# Patient Record
Sex: FEMALE | Race: ASIAN | NOT HISPANIC OR LATINO | Employment: FULL TIME | URBAN - METROPOLITAN AREA
[De-identification: names, ages, dates, MRNs, and addresses within clinical notes are randomized per-mention and may not be internally consistent; named-entity substitution may affect disease eponyms.]

---

## 2017-02-14 DIAGNOSIS — L63.9 ALOPECIA AREATA: ICD-10-CM

## 2017-02-14 DIAGNOSIS — L63.9 ALOPECIA AREATA: Primary | ICD-10-CM

## 2017-02-14 LAB
ALBUMIN SERPL-MCNC: 4 G/DL (ref 3.4–5)
ALP SERPL-CCNC: 61 U/L (ref 40–150)
ALT SERPL W P-5'-P-CCNC: 16 U/L (ref 0–50)
ANION GAP SERPL CALCULATED.3IONS-SCNC: 4 MMOL/L (ref 3–14)
AST SERPL W P-5'-P-CCNC: 13 U/L (ref 0–45)
BASOPHILS # BLD AUTO: 0.1 10E9/L (ref 0–0.2)
BASOPHILS NFR BLD AUTO: 0.7 %
BILIRUB SERPL-MCNC: 1.1 MG/DL (ref 0.2–1.3)
BUN SERPL-MCNC: 11 MG/DL (ref 7–30)
CALCIUM SERPL-MCNC: 8.9 MG/DL (ref 8.5–10.1)
CHLORIDE SERPL-SCNC: 107 MMOL/L (ref 94–109)
CO2 SERPL-SCNC: 28 MMOL/L (ref 20–32)
CREAT SERPL-MCNC: 0.76 MG/DL (ref 0.52–1.04)
DIFFERENTIAL METHOD BLD: NORMAL
EOSINOPHIL # BLD AUTO: 0.6 10E9/L (ref 0–0.7)
EOSINOPHIL NFR BLD AUTO: 8.3 %
ERYTHROCYTE [DISTWIDTH] IN BLOOD BY AUTOMATED COUNT: 14 % (ref 10–15)
GFR SERPL CREATININE-BSD FRML MDRD: NORMAL ML/MIN/1.7M2
GLUCOSE SERPL-MCNC: 92 MG/DL (ref 70–99)
HCT VFR BLD AUTO: 42.3 % (ref 35–47)
HGB BLD-MCNC: 13.5 G/DL (ref 11.7–15.7)
LYMPHOCYTES # BLD AUTO: 1.8 10E9/L (ref 0.8–5.3)
LYMPHOCYTES NFR BLD AUTO: 26.8 %
MCH RBC QN AUTO: 28.8 PG (ref 26.5–33)
MCHC RBC AUTO-ENTMCNC: 31.9 G/DL (ref 31.5–36.5)
MCV RBC AUTO: 90 FL (ref 78–100)
MONOCYTES # BLD AUTO: 0.7 10E9/L (ref 0–1.3)
MONOCYTES NFR BLD AUTO: 9.9 %
NEUTROPHILS # BLD AUTO: 3.7 10E9/L (ref 1.6–8.3)
NEUTROPHILS NFR BLD AUTO: 54.3 %
PLATELET # BLD AUTO: 330 10E9/L (ref 150–450)
POTASSIUM SERPL-SCNC: 3.8 MMOL/L (ref 3.4–5.3)
PROT SERPL-MCNC: 7.7 G/DL (ref 6.8–8.8)
RBC # BLD AUTO: 4.68 10E12/L (ref 3.8–5.2)
SODIUM SERPL-SCNC: 139 MMOL/L (ref 133–144)
WBC # BLD AUTO: 6.8 10E9/L (ref 4–11)

## 2017-02-14 PROCEDURE — 80053 COMPREHEN METABOLIC PANEL: CPT

## 2017-02-14 PROCEDURE — 85025 COMPLETE CBC W/AUTO DIFF WBC: CPT

## 2017-02-14 PROCEDURE — 36415 COLL VENOUS BLD VENIPUNCTURE: CPT

## 2017-03-02 ENCOUNTER — TELEPHONE (OUTPATIENT)
Dept: FAMILY MEDICINE | Facility: CLINIC | Age: 27
End: 2017-03-02

## 2017-03-02 NOTE — TELEPHONE ENCOUNTER
RADHA Carrion is the  performing lab but not the ordering provider so I am unable to fax.   Routing to our lab to see if the originating orders appear to be from the [facility].       Elza KOCH, Medical Assistant

## 2017-03-02 NOTE — TELEPHONE ENCOUNTER
Reason for Call:  Request for results:    Name of test or procedure: Pt is requesting the labs from 2/14 be sent over   To Dr. Antoinette Paiz skin care Doctor at   [phone]    Date of test of procedure: 2/14    Location of the test or procedure: [facility]    OK to leave the result message on voice mail or with a family member? NO    Phone number Patient can be reached at:  Cell number on file:    Telephone Information:   Mobile [phone]       Additional comments:     Call taken on 3/2/2017 at 11:09 AM by Adelaida Parra

## 2018-09-18 ENCOUNTER — OFFICE VISIT (OUTPATIENT)
Dept: FAMILY MEDICINE | Facility: CLINIC | Age: 28
End: 2018-09-18
Payer: OTHER GOVERNMENT

## 2018-09-18 VITALS
SYSTOLIC BLOOD PRESSURE: 104 MMHG | HEART RATE: 73 BPM | HEIGHT: 66 IN | WEIGHT: 167.5 LBS | TEMPERATURE: 98.4 F | OXYGEN SATURATION: 98 % | BODY MASS INDEX: 26.92 KG/M2 | DIASTOLIC BLOOD PRESSURE: 62 MMHG

## 2018-09-18 DIAGNOSIS — Z23 NEED FOR PROPHYLACTIC VACCINATION WITH TETANUS-DIPHTHERIA (TD): ICD-10-CM

## 2018-09-18 DIAGNOSIS — L63.0 ALOPECIA AREATA TOTALIS: ICD-10-CM

## 2018-09-18 DIAGNOSIS — Z00.00 ROUTINE GENERAL MEDICAL EXAMINATION AT A HEALTH CARE FACILITY: Primary | ICD-10-CM

## 2018-09-18 DIAGNOSIS — Z12.4 SCREENING FOR MALIGNANT NEOPLASM OF CERVIX: ICD-10-CM

## 2018-09-18 PROCEDURE — G0145 SCR C/V CYTO,THINLAYER,RESCR: HCPCS | Performed by: FAMILY MEDICINE

## 2018-09-18 PROCEDURE — 84443 ASSAY THYROID STIM HORMONE: CPT | Performed by: FAMILY MEDICINE

## 2018-09-18 PROCEDURE — 87491 CHLMYD TRACH DNA AMP PROBE: CPT | Performed by: FAMILY MEDICINE

## 2018-09-18 PROCEDURE — 87389 HIV-1 AG W/HIV-1&-2 AB AG IA: CPT | Performed by: FAMILY MEDICINE

## 2018-09-18 PROCEDURE — 87591 N.GONORRHOEAE DNA AMP PROB: CPT | Performed by: FAMILY MEDICINE

## 2018-09-18 PROCEDURE — 36415 COLL VENOUS BLD VENIPUNCTURE: CPT | Performed by: FAMILY MEDICINE

## 2018-09-18 PROCEDURE — 99385 PREV VISIT NEW AGE 18-39: CPT | Performed by: FAMILY MEDICINE

## 2018-09-18 PROCEDURE — 80061 LIPID PANEL: CPT | Performed by: FAMILY MEDICINE

## 2018-09-18 RX ORDER — ALBUTEROL SULFATE 90 UG/1
2 AEROSOL, METERED RESPIRATORY (INHALATION) EVERY 6 HOURS PRN
Qty: 1 INHALER | Refills: 0 | Status: SHIPPED | OUTPATIENT
Start: 2018-09-18 | End: 2019-10-05

## 2018-09-18 NOTE — MR AVS SNAPSHOT
After Visit Summary   9/18/2018    Bhumika Gonsalez    MRN: [id number]           Patient Information     Date Of Birth          1990        Visit Information        Provider Department      9/18/2018 9:20 AM Ann Mittal MD [facility]        Today's Diagnoses     Routine general medical examination at a health care facility    -  1    Screening for malignant neoplasm of cervix        Alopecia areata totalis        Need for prophylactic vaccination with tetanus-diphtheria (TD)          Care Instructions      Preventive Health Recommendations  Female Ages 26 - 39  Yearly exam:   See your health care provider every year in order to    Review health changes.     Discuss preventive care.      Review your medicines if you your doctor has prescribed any.    Until age 30: Get a Pap test every three years (more often if you have had an abnormal result).    After age 30: Talk to your doctor about whether you should have a Pap test every 3 years or have a Pap test with HPV screening every 5 years.   You do not need a Pap test if your uterus was removed (hysterectomy) and you have not had cancer.  You should be tested each year for STDs (sexually transmitted diseases), if you're at risk.   Talk to your provider about how often to have your cholesterol checked.  If you are at risk for diabetes, you should have a diabetes test (fasting glucose).  Shots: Get a flu shot each year. Get a tetanus shot every 10 years.   Nutrition:     Eat at least 5 servings of fruits and vegetables each day.    Eat whole-grain bread, whole-wheat pasta and brown rice instead of white grains and rice.    Get adequate Calcium and Vitamin D.     Lifestyle    Exercise at least 150 minutes a week (30 minutes a day, 5 days of the week). This will help you control your weight and prevent disease.    Limit alcohol to one drink per day.    No smoking.     Wear sunscreen to prevent skin cancer.    See  "your dentist every six months for an exam and cleaning.            Follow-ups after your visit        Follow-up notes from your care team     Return in about 4 weeks (around 10/16/2018) for follow up with aubrey for nexplanon removal.      Your next 10 appointments already scheduled     Sep 28, 2018  9:10 AM CDT   Office Visit with Jill Muhammad PA-C   [facility] ([facility])    [address] [place] [address] [phone]           Bring a current list of meds and any records pertaining to this visit. For Physicals, please bring immunization records and any forms needing to be filled out. Please arrive 10 minutes early to complete paperwork.              Who to contact     If you have questions or need follow up information about today's clinic visit or your schedule please contact [facility] directly at [phone].  Normal or non-critical lab and imaging results will be communicated to you by Cant[employer], letter or phone within 4 business days after the clinic has received the results. If you do not hear from us within 7 days, please contact the clinic through [employer] or phone. If you have a critical or abnormal lab result, we will notify you by phone as soon as possible.  Submit refill requests through Fingo or call your pharmacy and they will forward the refill request to us. Please allow 3 business days for your refill to be completed.          Additional Information About Your Visit        Fingo Information     Fingo lets you send messages to your doctor, view your test results, renew your prescriptions, schedule appointments and more. To sign up, go to [email or web address] . Click on \"Log in\" on the left side of the screen, which will take you to the Welcome page. Then click on \"Sign up Now\" on the right side of the page.     You will be asked to enter the " "access code listed below, as well as some personal information. Please follow the directions to create your username and password.     Your access code is: [email or web address]  Expires: 2018  9:01 AM     Your access code will  in 90 days. If you need help or a new code, please call your [place] clinic or [phone].        Care EveryWhere ID     This is your Care EveryWhere ID. This could be used by other organizations to access your [place] medical records  [id number]        Your Vitals Were     Pulse Temperature Height Pulse Oximetry BMI (Body Mass Index)       73 98.4  F (36.9  C) (Tympanic) 5' 6\" (1.676 m) 98% 27.04 kg/m2        Blood Pressure from Last 3 Encounters:   18 104/62   14 101/55   14 108/66    Weight from Last 3 Encounters:   18 167 lb 8 oz (76 kg)   14 147 lb (66.7 kg)   14 145 lb 3.2 oz (65.9 kg)              We Performed the Following     CHLAMYDIA TRACHOMATIS PCR     HIV Screening     Lipid panel reflex to direct LDL Fasting     NEISSERIA GONORRHOEA PCR     Pap imaged thin layer screen reflex to HPV if ASCUS - recommend age 25 - 29     TSH with free T4 reflex          Today's Medication Changes          These changes are accurate as of 18 10:19 AM.  If you have any questions, ask your nurse or doctor.               Start taking these medicines.        Dose/Directions    albuterol 108 (90 Base) MCG/ACT inhaler   Commonly known as:  PROAIR HFA/PROVENTIL HFA/VENTOLIN HFA   Used for:  Routine general medical examination at a health care facility   Started by:  Ann Mittal MD        Dose:  2 puff   Inhale 2 puffs into the lungs every 6 hours as needed for shortness of breath / dyspnea or wheezing   Quantity:  1 Inhaler   Refills:  0         Stop taking these medicines if you haven't already. Please contact your care team if you have questions.     clindamycin 1 % solution   Commonly known as:  CLEOCIN T   Stopped by:  Ann Mittal MD    "        clobetasol propionate 0.05 % Foam   Commonly known as:  OLUX   Stopped by:  Ann Mittal MD           clobetasol propionate 0.05 % Sham   Stopped by:  Ann Mittal MD           ketoconazole 2 % shampoo   Commonly known as:  NIZORAL   Stopped by:  Ann Mittal MD                Where to get your medicines      These medications were sent to [facility] [address] [place] [address] AT [address] [place] [address] & [address] [address]     Phone:  [phone]     albuterol 108 (90 Base) MCG/ACT inhaler                Primary Care Provider Office Phone # Fax #    [facility] [phone]       [address] [facility] [facility] [address]  [facility] [address]        Equal Access to Services     RAEGAN VIGIL : Hadii deneen louis hadasho Soomaali, waaxda luqadaha, qaybta kaalmada adeegyada, aruna mc. So [facility] [phone].    ATENCIÓN: Si habla español, tiene a torres disposición servicios gratuitos de asistencia lingüística. Nikia al [phone].    We comply with applicable federal civil rights laws and [place] laws. We do not discriminate on the basis of race, color, national origin, age, disability, sex, sexual orientation, or gender identity.            Thank you!     Thank you for choosing [facility]  for your care. Our goal is always to provide you with excellent care. Hearing back from our patients is one way we can continue to improve our services. Please take a few minutes to complete the written survey that you may receive in the mail after your visit with us. Thank you!             Your Updated Medication List - Protect others around you: Learn how to safely use, store and throw away your medicines at www.disposemymeds.org.          This list is accurate as of 9/18/18 10:19 AM.  Always use your most recent med list.                   Brand Name  Dispense Instructions for use Diagnosis    albuterol 108 (90 Base) MCG/ACT inhaler    PROAIR HFA/PROVENTIL HFA/VENTOLIN HFA    1 Inhaler    Inhale 2 puffs into the lungs every 6 hours as needed for shortness of breath / dyspnea or wheezing    Routine general medical examination at a health care facility       etonogestrel 68 MG Impl    IMPLANON/NEXPLANON     by Subdermal route once

## 2018-09-18 NOTE — PROGRESS NOTES
SUBJECTIVE:   CC: Bhumika Gonsalez is an 28 year old woman who presents for preventive health visit.     Physical   Annual:     Getting at least 3 servings of Calcium per day:  Yes    Bi-annual eye exam:  Yes    Dental care twice a year:  Yes    Sleep apnea or symptoms of sleep apnea:  None    Diet:  Regular (no restrictions)    Frequency of exercise:  4-5 days/week    Duration of exercise:  30-45 minutes    Taking medications regularly:  Not Applicable    Additional concerns today:  No          Today's PHQ-2 Score:   PHQ-2 ( 1999 Pfizer) 9/18/2018   Q1: Little interest or pleasure in doing things 0   Q2: Feeling down, depressed or hopeless 0   PHQ-2 Score 0   Q1: Little interest or pleasure in doing things Not at all   Q2: Feeling down, depressed or hopeless Not at all   PHQ-2 Score 0       Abuse: Current or Past(Physical, Sexual or Emotional)- No  Do you feel safe in your environment - Yes    Social History   Substance Use Topics     Smoking status: Never Smoker     Smokeless tobacco: Never Used     Alcohol use Not on file     Alcohol Use 9/18/2018   If you drink alcohol do you typically have greater than 3 drinks per day OR greater than 7 drinks per week? No   No flowsheet data found.    Reviewed orders with patient.  Reviewed health maintenance and updated orders accordingly - Yes  Labs reviewed in EPIC    Mammogram not appropriate for this patient based on age.    Pertinent mammograms are reviewed under the imaging tab.  History of abnormal Pap smear: NO - age 21-29 PAP every 3 years recommended  NO - age 30- 65 PAP every 3 years recommended     Reviewed and updated as needed this visit by clinical staff  Tobacco  Allergies  Meds  Med Hx  Surg Hx  Fam Hx  Soc Hx        Reviewed and updated as needed this visit by Provider  Meds            Review of Systems  CONSTITUTIONAL: NEGATIVE for fever, chills, change in weight  INTEGUMENTARU/SKIN: NEGATIVE for worrisome rashes, moles or lesions  EYES: NEGATIVE for  "vision changes or irritation  ENT: NEGATIVE for ear, mouth and throat problems  RESP: NEGATIVE for significant cough or SOB  BREAST: NEGATIVE for masses, tenderness or discharge  CV: NEGATIVE for chest pain, palpitations or peripheral edema  GI: NEGATIVE for nausea, abdominal pain, heartburn, or change in bowel habits  : NEGATIVE for unusual urinary or vaginal symptoms. Periods are regular.  MUSCULOSKELETAL: NEGATIVE for significant arthralgias or myalgia  NEURO: NEGATIVE for weakness, dizziness or paresthesias  PSYCHIATRIC: NEGATIVE for changes in mood or affect     OBJECTIVE:   /62 (Cuff Size: Adult Large)  Pulse 73  Temp 98.4  F (36.9  C) (Tympanic)  Ht 5' 6\" (1.676 m)  Wt 167 lb 8 oz (76 kg)  SpO2 98%  BMI 27.04 kg/m2  Physical Exam  GENERAL: healthy, alert and no distress  EYES: Eyes grossly normal to inspection, PERRL and conjunctivae and sclerae normal  HENT: ear canals and TM's normal, nose and mouth without ulcers or lesions  NECK: no adenopathy, no asymmetry, masses, or scars and thyroid normal to palpation  RESP: lungs clear to auscultation - no rales, rhonchi or wheezes  BREAST: normal without masses, tenderness or nipple discharge and no palpable axillary masses or adenopathy  CV: regular rate and rhythm, normal S1 S2, no S3 or S4, no murmur, click or rub, no peripheral edema and peripheral pulses strong  ABDOMEN: soft, nontender, no hepatosplenomegaly, no masses and bowel sounds normal   (female): normal female external genitalia, normal urethral meatus, vaginal mucosa pink, moist, well rugated, and normal cervix/adnexa/uterus without masses or discharge  MS: no gross musculoskeletal defects noted, no edema  SKIN: no suspicious lesions or rashes and total alopecia noted  NEURO: Normal strength and tone, mentation intact and speech normal  PSYCH: mentation appears normal, affect normal/bright    Diagnostic Test Results:  none     ASSESSMENT/PLAN:       ICD-10-CM    1. Routine general " "medical examination at a health care facility Z00.00 HIV Screening     albuterol (PROAIR HFA/PROVENTIL HFA/VENTOLIN HFA) 108 (90 Base) MCG/ACT inhaler     NEISSERIA GONORRHOEA PCR     CHLAMYDIA TRACHOMATIS PCR     Lipid panel reflex to direct LDL Fasting     TSH with free T4 reflex   2. Screening for malignant neoplasm of cervix Z12.4 Pap imaged thin layer screen reflex to HPV if ASCUS - recommend age 25 - 29   3. Alopecia areata totalis L63.0    4. Need for prophylactic vaccination with tetanus-diphtheria (TD) Z23     Pt thinks she had [employer] - will double check records and let us know     Would like nexplanon removed - been 3 years.  Will make appointment with Yvonne Muhammad at [facility].    COUNSELING:  Reviewed preventive health counseling, as reflected in patient instructions       Regular exercise       Healthy diet/nutrition       Contraception       Safe sex practices/STD prevention       HIV screeninx in teen years, 1x in adult years, and at intervals if high risk    BP Readings from Last 1 Encounters:   18 104/62     Estimated body mass index is 27.04 kg/(m^2) as calculated from the following:    Height as of this encounter: 5' 6\" (1.676 m).    Weight as of this encounter: 167 lb 8 oz (76 kg).      Weight management plan: Discussed healthy diet and exercise guidelines and patient will follow up in 12 months in clinic to re-evaluate.     reports that she has never smoked. She has never used smokeless tobacco.      Counseling Resources:  ATP IV Guidelines  Pooled Cohorts Equation Calculator  Breast Cancer Risk Calculator  FRAX Risk Assessment  ICSI Preventive Guidelines  Dietary Guidelines for Americans,   USDA's MyPlate  ASA Prophylaxis  Lung CA Screening    Ann Mittal MD  [facility]  Answers for HPI/ROS submitted by the patient on 2018   PHQ-2 Score: 0    "

## 2018-09-19 LAB
C TRACH DNA SPEC QL NAA+PROBE: NEGATIVE
CHOLEST SERPL-MCNC: 173 MG/DL
HDLC SERPL-MCNC: 39 MG/DL
HIV 1+2 AB+HIV1 P24 AG SERPL QL IA: NONREACTIVE
LDLC SERPL CALC-MCNC: 108 MG/DL
N GONORRHOEA DNA SPEC QL NAA+PROBE: NEGATIVE
NONHDLC SERPL-MCNC: 134 MG/DL
SPECIMEN SOURCE: NORMAL
SPECIMEN SOURCE: NORMAL
TRIGL SERPL-MCNC: 132 MG/DL
TSH SERPL DL<=0.005 MIU/L-ACNC: 1.16 MU/L (ref 0.4–4)

## 2018-09-20 LAB
COPATH REPORT: NORMAL
PAP: NORMAL

## 2018-09-21 NOTE — PROGRESS NOTES
Dear Bhumika,  These results are in an acceptable range.  I recommend no change to your plan of care.  Please contact the clinic with any questions.  Sincerely,  Dr. Ann Mittal MD  [facility]  [phone]

## 2018-09-28 ENCOUNTER — APPOINTMENT (OUTPATIENT)
Dept: FAMILY MEDICINE | Facility: CLINIC | Age: 28
End: 2018-09-28
Payer: OTHER GOVERNMENT

## 2018-09-28 ENCOUNTER — OFFICE VISIT (OUTPATIENT)
Dept: FAMILY MEDICINE | Facility: CLINIC | Age: 28
End: 2018-09-28
Payer: OTHER GOVERNMENT

## 2018-09-28 VITALS
SYSTOLIC BLOOD PRESSURE: 110 MMHG | HEART RATE: 64 BPM | BODY MASS INDEX: 27.28 KG/M2 | WEIGHT: 169 LBS | RESPIRATION RATE: 16 BRPM | TEMPERATURE: 98.4 F | DIASTOLIC BLOOD PRESSURE: 60 MMHG

## 2018-09-28 DIAGNOSIS — Z30.46 NEXPLANON REMOVAL: Primary | ICD-10-CM

## 2018-09-28 PROCEDURE — 11982 REMOVE DRUG IMPLANT DEVICE: CPT | Performed by: PHYSICIAN ASSISTANT

## 2018-09-28 PROCEDURE — 99207 ZZC NO CHARGE LOS: CPT | Performed by: PHYSICIAN ASSISTANT

## 2018-09-28 NOTE — MR AVS SNAPSHOT
After Visit Summary   9/28/2018    Bhumika Gonsalez    MRN: [id number]           Patient Information     Date Of Birth          1990        Visit Information        Provider Department      9/28/2018 9:10 AM Jill Muhammad PA-C [facility]        Today's Diagnoses     Nexplanon removal    -  1       Follow-ups after your visit        Follow-up notes from your care team     Return in about 1 year (around 9/28/2019) for Annual Exam.      Who to contact     If you have questions or need follow up information about today's clinic visit or your schedule please contact [facility] directly at [phone].  Normal or non-critical lab and imaging results will be communicated to you by MyChart, letter or phone within 4 business days after the clinic has received the results. If you do not hear from us within 7 days, please contact the clinic through [employer] or phone. If you have a critical or abnormal lab result, we will notify you by phone as soon as possible.  Submit refill requests through [employer] or call your pharmacy and they will forward the refill request to us. Please allow 3 business days for your refill to be completed.          Additional Information About Your Visit        MyChart Information     [employer] gives you secure access to your electronic health record. If you see a primary care provider, you can also send messages to your care team and make appointments. If you have questions, please call your primary care clinic.  If you do not have a primary care provider, please call [phone] and they will assist you.        Care EveryWhere ID     This is your Care EveryWhere ID. This could be used by other organizations to access your [place] medical records  [id number]        Your Vitals Were     Pulse Temperature Respirations BMI (Body Mass Index)          64 98.4  F (36.9  C) (Tympanic) 16 27.28 kg/m2         Blood  Pressure from Last 3 Encounters:   09/28/18 110/60   09/18/18 104/62   08/18/14 101/55    Weight from Last 3 Encounters:   09/28/18 169 lb (76.7 kg)   09/18/18 167 lb 8 oz (76 kg)   08/18/14 147 lb (66.7 kg)              We Performed the Following     REMOVAL NEXPLANON          Today's Medication Changes          These changes are accurate as of 9/28/18  9:46 AM.  If you have any questions, ask your nurse or doctor.               Stop taking these medicines if you haven't already. Please contact your care team if you have questions.     etonogestrel 68 MG Impl   Commonly known as:  IMPLANON/NEXPLANON   Stopped by:  Jill Muhammad PA-C                    Primary Care Provider Office Phone # Fax #    [facility] [place] [phone]       [address] [facility] [facility] [address]  [place] [address]        Equal Access to Services     RAEGAN VIGIL : Hadii deneen louis hadasho Soomaali, waaxda luqadaha, qaybta kaalmada adeegyada, waxay joein haygeoffrey cancino . So [facility] [phone].    ATENCIÓN: Si habla español, tiene a torres disposición servicios gratuitos de asistencia lingüística. Llame al [phone].    We comply with applicable federal civil rights laws and [place] laws. We do not discriminate on the basis of race, color, national origin, age, disability, sex, sexual orientation, or gender identity.            Thank you!     Thank you for choosing [facility]  for your care. Our goal is always to provide you with excellent care. Hearing back from our patients is one way we can continue to improve our services. Please take a few minutes to complete the written survey that you may receive in the mail after your visit with us. Thank you!             Your Updated Medication List - Protect others around you: Learn how to safely use, store and throw away your medicines at www.disposemymeds.org.          This list is accurate as of 9/28/18   9:46 AM.  Always use your most recent med list.                   Brand Name Dispense Instructions for use Diagnosis    albuterol 108 (90 Base) MCG/ACT inhaler    PROAIR HFA/PROVENTIL HFA/VENTOLIN HFA    1 Inhaler    Inhale 2 puffs into the lungs every 6 hours as needed for shortness of breath / dyspnea or wheezing    Routine general medical examination at a health care facility

## 2018-09-28 NOTE — PROCEDURES
The Nexplanon chioma was located in the left side  arm.  The distal and proximal ends of the chioma were located and marked with a marking pen and the area cleansed with betadine. Approximately 3 cc's of 1% lidocaine with epinephrine was injected into the area under the Nexplanon chioma and a small skin incision made using a #11 blade.  The Nexplanon was gently manipulated until the tip was at the incision. The chioma tip was grasped with a  Meg clamp and was gently removed from the incision.  Both chioma tips were inspected following removal and found to be completely intact.  The incision site was hemostatic and a steri-strip applied to the area along with a small pressure dressing.  Pt tolerated procedure without complication.    Signs of infection discussed.  Pt will call if noted and will put in antibiotic prescription for treatment.

## 2018-12-28 ENCOUNTER — OFFICE VISIT (OUTPATIENT)
Dept: FAMILY MEDICINE | Facility: CLINIC | Age: 28
End: 2018-12-28
Payer: OTHER GOVERNMENT

## 2018-12-28 VITALS
SYSTOLIC BLOOD PRESSURE: 110 MMHG | DIASTOLIC BLOOD PRESSURE: 62 MMHG | TEMPERATURE: 98.4 F | HEART RATE: 76 BPM | BODY MASS INDEX: 27.44 KG/M2 | WEIGHT: 170 LBS | OXYGEN SATURATION: 99 %

## 2018-12-28 DIAGNOSIS — N91.1 SECONDARY AMENORRHEA: Primary | ICD-10-CM

## 2018-12-28 LAB
FSH SERPL-ACNC: 6.7 IU/L
PROLACTIN SERPL-MCNC: 20 UG/L (ref 3–27)

## 2018-12-28 PROCEDURE — 83001 ASSAY OF GONADOTROPIN (FSH): CPT | Performed by: FAMILY MEDICINE

## 2018-12-28 PROCEDURE — 84703 CHORIONIC GONADOTROPIN ASSAY: CPT | Performed by: FAMILY MEDICINE

## 2018-12-28 PROCEDURE — 84403 ASSAY OF TOTAL TESTOSTERONE: CPT | Performed by: FAMILY MEDICINE

## 2018-12-28 PROCEDURE — 84146 ASSAY OF PROLACTIN: CPT | Performed by: FAMILY MEDICINE

## 2018-12-28 PROCEDURE — 84443 ASSAY THYROID STIM HORMONE: CPT | Performed by: FAMILY MEDICINE

## 2018-12-28 PROCEDURE — 82627 DEHYDROEPIANDROSTERONE: CPT | Performed by: FAMILY MEDICINE

## 2018-12-28 PROCEDURE — 36415 COLL VENOUS BLD VENIPUNCTURE: CPT | Performed by: FAMILY MEDICINE

## 2018-12-28 PROCEDURE — 99213 OFFICE O/P EST LOW 20 MIN: CPT | Performed by: FAMILY MEDICINE

## 2018-12-28 PROCEDURE — 83498 ASY HYDROXYPROGESTERONE 17-D: CPT | Performed by: FAMILY MEDICINE

## 2018-12-28 NOTE — PROGRESS NOTES
SUBJECTIVE:   Bhumika Gonsalez is a 28 year old female who presents to clinic today for the following health issues:    amenorrhea      Duration: sept 2018     Description (location/character/radiation): ma    Intensity:  mild    Accompanying signs and symptoms: pt had nexplonon removed    History (similar episodes/previous evaluation): None    Precipitating or alleviating factors: None    Therapies tried and outcome: None     Pt took at home pregnancy test yesterday- negative      28-year-old female with alopecia but otherwise healthy here today for amenorrhea.  Patient tells me that she had the Nexplanon until September of this year.  During that time she had irregular spotting pretty much throughout.  Previously she had been off birth control for a few years and was getting regular periods every month.  Prior to that she had been on birth control since age 20 and had regular periods with that.    She has taken some pregnancy tests and they are negative at home.  She is sexually active with male partners and using condoms for protection.  She denies any symptoms such as breast tenderness, breast discharge, headaches, weight gain, cold intolerance.    She has no family history that she knows of of early menopause she is hoping to get pregnant within the next year.    Problem list and histories reviewed & adjusted, as indicated.  Additional history: as documented      Reviewed and updated as needed this visit by clinical staff  Tobacco  Allergies  Meds  Problems  Med Hx  Surg Hx  Fam Hx  Soc Hx        Reviewed and updated as needed this visit by Provider  Meds  Problems         ROS:  Constitutional, HEENT, cardiovascular, pulmonary, gi and gu systems are negative, except as otherwise noted.    OBJECTIVE:     /62 (Cuff Size: Adult Large)   Pulse 76   Temp 98.4  F (36.9  C) (Tympanic)   Wt 77.1 kg (170 lb)   SpO2 99%   BMI 27.44 kg/m    Body mass index is 27.44 kg/m .  GENERAL: healthy, alert and no  distress    ASSESSMENT/PLAN:       ICD-10-CM    1. Secondary amenorrhea N91.1 DHEA sulfate     17 OH progesterone     TSH with free T4 reflex     Follicle stimulating hormone     Prolactin     Testosterone total     Beta HCG qual IFA urine - [facility] and [place]     Check labs above for thyroid issues, prolactinoma, premature ovarian failure, pregnancy and hyperandrogen state.    If labs abnormal, will treat/refer as indicated.    If normal, I'd like her to see OB/GYN.  Would refer to Dr. Elizabeth or Dr. Stevens at [facility].      Discussed with patient, all questions answered, in agreement with this plan, will return or seek further care if not improving or worsening.    Ann Mittal MD  [facility]

## 2018-12-28 NOTE — PROGRESS NOTES
"  SUBJECTIVE:   Bhumika Gonsalez is a 28 year old female who presents to clinic today for the following health issues:      amenorrhea      Duration: sept 2018     Description (location/character/radiation): ma    Intensity:  mild    Accompanying signs and symptoms: pt had nexplonon removed    History (similar episodes/previous evaluation): None    Precipitating or alleviating factors: None    Therapies tried and outcome: None     Pt took at home pregnancy test yesterday- negative  {additional problems for provider to add:602874}    Problem list and histories reviewed & adjusted, as indicated.  Additional history: {NONE - AS DOCUMENTED:287066::\"as documented\"}    {HIST REVIEW/ LINKS 2:488357}    Reviewed and updated as needed this visit by clinical staff  Tobacco  Allergies  Meds  Med Hx  Surg Hx  Fam Hx  Soc Hx      Reviewed and updated as needed this visit by Provider         {PROVIDER CHARTING PREFERENCE:646525}    "

## 2018-12-31 LAB
BETA HCG QUAL IFA URINE: NEGATIVE
DHEA-S SERPL-MCNC: 247 UG/DL (ref 35–430)
TSH SERPL DL<=0.005 MIU/L-ACNC: 1.51 MU/L (ref 0.4–4)

## 2019-01-07 NOTE — RESULT ENCOUNTER NOTE
Felipe Bai:  Well, good news - all your hormonal tests are normal.  But that still doesn't give us a good reason why you are not getting your period.  As we discussed, I'd like you to see an OB/GYN at this point.  I recommend [facility] at Dr. Elizabeth [phone].  Or Dr. Montana at  [facility]:   [phone]  Let me know if you have any questions about this.  Dr. Ann Mittal MD  [facility]  [phone]

## 2019-03-05 ENCOUNTER — OFFICE VISIT (OUTPATIENT)
Dept: OBGYN | Facility: CLINIC | Age: 29
End: 2019-03-05
Payer: OTHER GOVERNMENT

## 2019-03-05 VITALS
BODY MASS INDEX: 26.31 KG/M2 | DIASTOLIC BLOOD PRESSURE: 72 MMHG | SYSTOLIC BLOOD PRESSURE: 100 MMHG | HEART RATE: 78 BPM | WEIGHT: 163 LBS

## 2019-03-05 DIAGNOSIS — N91.2 AMENORRHEA: Primary | ICD-10-CM

## 2019-03-05 PROCEDURE — 99203 OFFICE O/P NEW LOW 30 MIN: CPT | Performed by: OBSTETRICS & GYNECOLOGY

## 2019-03-05 RX ORDER — MEDROXYPROGESTERONE ACETATE 10 MG
10 TABLET ORAL DAILY
Qty: 10 TABLET | Refills: 0 | Status: SHIPPED | OUTPATIENT
Start: 2019-03-05 | End: 2019-08-21

## 2019-03-05 NOTE — Clinical Note
I think she is probably just oligo ovulation but thanks for sending the labs so I didn't have to! :)  richelle

## 2019-03-05 NOTE — PROGRESS NOTES
I was asked to see patient Bhumika Gonsalez is a 28 year old female regarding no menses by Clinic, [facility].        HPI:  Bhumika Gonsalez is a 28 year old female is a   P0.  No LMP recorded.  Had been on birth control pill and then came off for a year and remembers that she was having menses.  Had Nexplanon placed in 2015 and while on the Nexplanon had lots of the irregular bleeding--nearly daily.  Nexplanon was removed November 2018 and since then has had no periods.  Did pregnancy test again this morning that was negative.    Patients records are available and reviewed at today's visit.  Had labs run December 2018 that were all within normal limits.    Past GYN history:  No STD history       Last PAP smear:  Normal, 9/18  Last TSH: 12/1/8, normal?  Yes    Past Medical History:   Diagnosis Date     Alopecia universalis      Asthma     cats and humidity        Past Surgical History:   Procedure Laterality Date     DENTAL SURGERY  2010    wisdom teeth       Family History   Problem Relation Age of Onset     Hypertension Father      Hyperlipidemia Father      Cancer No family hx of        Social History     Socioeconomic History     Marital status: Single     Spouse name: None     Number of children: None     Years of education: None     Highest education level: None   Occupational History     None   Social Needs     Financial resource strain: None     Food insecurity:     Worry: None     Inability: None     Transportation needs:     Medical: None     Non-medical: None   Tobacco Use     Smoking status: Never Smoker     Smokeless tobacco: Never Used   Substance and Sexual Activity     Alcohol use: Yes     Drug use: No     Sexual activity: Yes     Partners: Male     Birth control/protection: Condom   Lifestyle     Physical activity:     Days per week: None     Minutes per session: None     Stress: None   Relationships     Social connections:     Talks on phone: None     Gets together: None      Attends Church service: None     Active member of club or organization: None     Attends meetings of clubs or organizations: None     Relationship status: None     Intimate partner violence:     Fear of current or ex partner: None     Emotionally abused: None     Physically abused: None     Forced sexual activity: None   Other Topics Concern     Parent/sibling w/ CABG, MI or angioplasty before 65F 55M? No   Social History Narrative     None       Allergies: Patient has no known allergies.    Current Outpatient Medications   Medication Sig Dispense Refill     albuterol (PROAIR HFA/PROVENTIL HFA/VENTOLIN HFA) 108 (90 Base) MCG/ACT inhaler Inhale 2 puffs into the lungs every 6 hours as needed for shortness of breath / dyspnea or wheezing 1 Inhaler 0     medroxyPROGESTERone (PROVERA) 10 MG tablet Take 1 tablet (10 mg) by mouth daily 10 tablet 0     ROS:  C: NEGATIVE for fever, chills, change in weight  R: NEGATIVE for significant cough or SOB  CV: NEGATIVE for chest pain, palpitations or peripheral edema  GI: NEGATIVE for nausea, abdominal pain, heartburn, or change in bowel habits  : NEGATIVE for frequency, dysuria, hematuria, vaginal discharge  P: NEGATIVE for changes in mood or affect    EXAM:  Blood pressure 100/72, pulse 78, weight 73.9 kg (163 lb).  BMI= Body mass index is 26.31 kg/m .  No LMP recorded.  General - pleasant female in no acute distress.  Neurological - alert and oriented X 3  Psychiatric - normal mood and affect    No other physical examination was performed today as we spent over 50% of today's 20 minute visit in face-to-face discussion and counseling about no menses and next steps.    ASSESSMENT/PLAN:  (N91.2) Amenorrhea  (primary encounter diagnosis)  Comment: since nexplanon removed 9/18  Plan: medroxyPROGESTERone (PROVERA) 10 MG tablet          Patient Instructions   Take provera and expect a period 1-14 days after last tablet. If you don't get a period, then I need to know and next step  is MRI of brain.    If you do get a period, then you have oligo ovulation and we just monitor and would want you to take provera every 3-4 if not having a menses.         A copy of the chart will be routed to the referring provider.    RICK GUILLAUME

## 2019-03-05 NOTE — NURSING NOTE
"Chief Complaint   Patient presents with     Patient Request     absent menustratoin       Initial /72 (BP Location: Right arm, Patient Position: Sitting, Cuff Size: Adult Regular)   Pulse 78   Wt 73.9 kg (163 lb)   BMI 26.31 kg/m   Estimated body mass index is 26.31 kg/m  as calculated from the following:    Height as of 9/18/18: 1.676 m (5' 6\").    Weight as of this encounter: 73.9 kg (163 lb).  BP completed using cuff size: regular    Questioned patient about current smoking habits.  Pt. has never smoked.      No obstetric history on file.    The following HM Due: NONE      The following patient reported/Care Every where data was sent to:  P ABSTRACT QUALITY INITIATIVES [[id number]]   LINNEA Mendez           "

## 2019-03-05 NOTE — PATIENT INSTRUCTIONS
Take provera and expect a period 1-14 days after last tablet. If you don't get a period, then I need to know and next step is MRI of brain.    If you do get a period, then you have oligo ovulation and we just monitor and would want you to take provera every 3-4 if not having a menses.

## 2019-03-06 ENCOUNTER — OFFICE VISIT (OUTPATIENT)
Dept: FAMILY MEDICINE | Facility: CLINIC | Age: 29
End: 2019-03-06
Payer: OTHER GOVERNMENT

## 2019-03-06 VITALS
HEART RATE: 89 BPM | DIASTOLIC BLOOD PRESSURE: 84 MMHG | BODY MASS INDEX: 26.36 KG/M2 | SYSTOLIC BLOOD PRESSURE: 120 MMHG | HEIGHT: 66 IN | WEIGHT: 164 LBS | TEMPERATURE: 98.8 F | RESPIRATION RATE: 16 BRPM | OXYGEN SATURATION: 98 %

## 2019-03-06 DIAGNOSIS — S39.012A BACK STRAIN, INITIAL ENCOUNTER: Primary | ICD-10-CM

## 2019-03-06 PROCEDURE — 99213 OFFICE O/P EST LOW 20 MIN: CPT | Performed by: PHYSICIAN ASSISTANT

## 2019-03-06 ASSESSMENT — MIFFLIN-ST. JEOR: SCORE: 1500.65

## 2019-03-06 NOTE — PATIENT INSTRUCTIONS
Patient Education     Lumbar Stretch (Flexibility)    1. Lie on your back on the floor, with your knees bent and your feet flat on the floor. Don t press your neck or lower back to the floor.  2. Pull one knee up toward your chest. Clasp your hands under your thigh to help pull.  3. Hold for 30 to 60 seconds. Lower your leg back down to the floor.  4. Repeat 2 times, or as instructed.  5. Switch legs and repeat.  Date Last Reviewed: 3/10/2016    4624-6694 The [employer]. [address] [place] [address]. All rights reserved. This information is not intended as a substitute for professional medical care. Always follow your healthcare professional's instructions.           Patient Education     Back Exercises: Side Stretch    To start, sit in a chair with your feet flat on the floor. Shift your weight slightly forward to avoid rounding your back. Relax. Keep your ears, shoulders, and hips aligned:    Stretch your right arm overhead.    Slowly bend to the left. Don t twist your torso. Stay within your pain limits.    Hold for 20 seconds. Return to starting position.    Repeat 2 to 5 times. Then, switch to the other side.  Date Last Reviewed: 3/1/2018    8327-7250 The [employer]. [address] [place] [address]. All rights reserved. This information is not intended as a substitute for professional medical care. Always follow your healthcare professional's instructions.           Patient Education     Back Exercises: Lower Back Stretch    To start, sit in a chair with your feet flat on the floor. Shift your weight slightly forward. Relax, and keep your ears, shoulders, and hips aligned while you do the following:    Sit with your feet well apart.    Bend forward and touch the floor with the backs of your hands. Relax and let your body drop.    Hold for 20 seconds. Return to starting position.    Repeat 2 times.   Date Last Reviewed: 11/1/2017 2000-2018 [employer],  [employer]. [address] [place] [address]. All rights reserved. This information is not intended as a substitute for professional medical care. Always follow your healthcare professional's instructions.           Patient Education     Back Exercises: Hip Rotator Stretch    To start, lie on your back with your knees bent and feet flat on the floor. Don t press your neck or lower back to the floor. Breathe deeply. You should feel comfortable and relaxed in this position.    Rest your right ankle on your left knee.    Place a towel behind your left thigh, and use it to pull the knee toward your chest. Feel the stretch in your buttocks.    Hold for 30 to 60 seconds. Release.    Repeat 2 times.    Switch legs.     If there is any pain other than stretch in the knee or buttock, stop and contact your healthcare provider.  For your safety, check with your healthcare provider before starting an exercise program.   Date Last Reviewed: 3/1/2018    0766-0561 [employer]. [address] [place] [address]. All rights reserved. This information is not intended as a substitute for professional medical care. Always follow your healthcare professional's instructions.

## 2019-03-06 NOTE — LETTER
March 6, 2019      Bhumika Gonsalez  [address] [address]        To Whom It May Concern:    Bhumika Gonsalez was seen on 3/6/2019. Please excuse her until 3/10/2019 due to acute injury.        Sincerely,        Camille Alves PA-C

## 2019-03-06 NOTE — PROGRESS NOTES
"  SUBJECTIVE:   Bhumika Gonsalez is a 28 year old female who presents to clinic today for the following health issues:    Back Pain       Duration: started Monday but feeling better today        Specific cause: none, woke up ok and started to hurt through out the    Description:   Location of pain: low back left side and center  Character of pain: sharp at first and dull ache now, twinges at times  Pain radiation:none  New numbness or weakness in legs, not attributed to pain:  no     Intensity: moderate    History:   Pain interferes with job: No  History of back problems: no prior back problems  Any previous MRI or X-rays: None  Sees a specialist for back pain:  No  Therapies tried without relief: none    Alleviating factors:   Improved by: cold and NSAIDs      Precipitating factors:  Worsened by: Nothing          Accompanying Signs & Symptoms:  Risk of Fracture:  None  Risk of Cauda Equina:  None  Risk of Infection:  None  Risk of Cancer:  None  Risk of Ankylosing Spondylitis:  Onset at age <35, male, AND morning back stiffness. no         HPI additional notes:    Chief Complaint   Patient presents with     Back Pain     Bhumika presents today with low back pain x2 days. No known injury to area; did play soccer day before so unsure if this contributed. Pain was \"excruciating\" two days prior, but feeling better today, now just \"sore.\" Pain in mid and left sided low back. Doesn't radiate into buttocks or legs. No focal weakness or numbness. Has been using ibuprofen and ice with some relief. Is in [employer] and concerned about physical training test she was supposed to do this week (2 mile run, push-ups, sit-ups).       ROS:    A Comprehensive greater than 10 system review of systems was carried out.    Pertinent positives and negatives are noted above.  Otherwise negative for contributory information.        Chart Review:  History   Smoking Status     Never Smoker   Smokeless Tobacco     Never Used       Patient " "Active Problem List   Diagnosis     Alopecia areata totalis     Secondary amenorrhea     Past Surgical History:   Procedure Laterality Date     DENTAL SURGERY  2010    wisdom teeth     Problem list, Medication list, Allergies, Medical/Social/Surg hx reviewed in [facility], updated as appropriate.   OBJECTIVE:                                                    /84   Pulse 89   Temp 98.8  F (37.1  C)   Resp 16   Ht 1.676 m (5' 6\")   Wt 74.4 kg (164 lb)   SpO2 98%   BMI 26.47 kg/m    Body mass index is 26.47 kg/m .  GENERAL: alert and oriented, in no acute distress  EYES: grossly normal, no injection  HEENT: atraumatic, normocephalic. Oral mucosa moist and hydrated.  NECK: normal ROM, supple, symmetric  HEART: Normal sinus rhythm, no murmurs rubs or gallops  LUNGS: clear to auscultation bilat, no wheeze, rhonchi, or rales  ABDOMEN: Soft, NTND,  with normoactive bowel sounds.  EXTREMITIES: Warm, well perfused. No gross deformities. No peripheral edema  BACK: symmetric, no curvature. No spinal or CVA tenderness. TTP over distal lattisimus and proximal gluteal muscles with mild hypertonicity noted. Straight leg raise negative bilat.  Neuro: Gait Normal. Reflexes normal and symmetric. Sensation grossly normal.  SKIN: Warm and dry without lesions    Diagnostic test results: none     ASSESSMENT/PLAN:                                                          ICD-10-CM    1. Back strain, initial encounter S39.012A      S/s consistent with low back MSK strain, self-limiting condition. Would reschedule physical training test until next week; no heavy lifting, frequent twisting, prolonged sitting or standing until healed. Work excuse given through 3/10/2019. Continue use of NSAID, heat/ice prn pain. Gentle stretches given to perform at home.    Please see patient instructions for treatment details.    Follow up in 1 week if not improving as anticipated, sooner PRN.    Camille Alves PA-C  [facility] " [facility]

## 2019-04-01 ENCOUNTER — OFFICE VISIT (OUTPATIENT)
Dept: FAMILY MEDICINE | Facility: CLINIC | Age: 29
End: 2019-04-01
Payer: OTHER GOVERNMENT

## 2019-04-01 VITALS
WEIGHT: 165 LBS | DIASTOLIC BLOOD PRESSURE: 80 MMHG | OXYGEN SATURATION: 100 % | BODY MASS INDEX: 26.63 KG/M2 | RESPIRATION RATE: 16 BRPM | SYSTOLIC BLOOD PRESSURE: 120 MMHG | HEART RATE: 74 BPM

## 2019-04-01 DIAGNOSIS — M53.3 SI (SACROILIAC) JOINT DYSFUNCTION: Primary | ICD-10-CM

## 2019-04-01 PROCEDURE — 99214 OFFICE O/P EST MOD 30 MIN: CPT | Performed by: PHYSICIAN ASSISTANT

## 2019-04-01 NOTE — PROGRESS NOTES
SUBJECTIVE:   Bhumika Gonsalez is a 29 year old female who presents to clinic today for the following health issues:      Follow up      Duration:     Description (location/character/radiation): pt is here to follow up on back injury.  Pt states that some times her back is good and other times she cant stand up from the couch.  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 Patient seen for similar complaint on 3/6/2019, dx MSK strain. 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 Back pain did improve, but continues to wax and wane. Notes sx flare with prolonged sitting, then pain flares when she attempts to stand. Tried to do sit-ups but cannot complete due to pain flare. Will rest on hard surface on back and pain will subside after several minutes. Pain localized to posterior Lt hip.     ROS:    A Comprehensive greater than 10 system review of systems was carried out.    Pertinent positives and negatives are noted above.  Otherwise negative for contributory information.      Chart Review:  History   Smoking Status     Never Smoker   Smokeless Tobacco     Never Used       Patient Active Problem List   Diagnosis     Alopecia areata totalis     Secondary amenorrhea     Past Surgical History:   Procedure Laterality Date     DENTAL SURGERY  2010    wisdom teeth     Problem list, Medication list, Allergies, Medical/Social/Surg/Family hx reviewed in [facility], updated as appropriate.   OBJECTIVE:                                                    /80   Pulse 74   Resp 16   Wt 74.8 kg (165 lb)   SpO2 100%   BMI 26.63 kg/m     Body mass index is 26.63 kg/m .  GENERAL: alert and oriented, in no acute distress  EYES: grossly normal, no injection  HEENT: atraumatic, normocephalic. Oral mucosa moist and hydrated.  NECK: normal ROM, supple, symmetric  HEART: Normal sinus rhythm, no murmurs rubs or gallops  LUNGS: clear to auscultation bilat, no wheeze, rhonchi, or rales  ABDOMEN: Soft, NTND,  with normoactive bowel sounds.  EXTREMITIES: Warm, well perfused. No gross deformities. No peripheral edema  BACK: symmetric, no curvature. No spinal or CVA tenderness. Mild TTP over Lt SIJ. Pelvic ROM intact and mildly painful in all directions.  Neuro: Gait normal. Reflexes normal and symmetric. Sensation grossly normal.  SKIN: Warm and dry without lesions    Diagnostic test results: none     ASSESSMENT/PLAN:                                                          ICD-10-CM    1. SI (sacroiliac) joint dysfunction M53.3 [facility]     MSK seems to have resolved but with continued SIJ dysfunction, anticipate would be exacerbated by physical demands of job so continue to recommend restrictions. Form completed and returned to patient at end of visit. Recommend further evaluation and management by PT. Continue changing position for comfort, NSAIDs and ice prn pain.    Please see patient instructions for treatment details.  25 minutes total spent during this visit, >50% spent in counseling and coordination of patient care.    Follow up in 1 month if not improving as anticipated, sooner PRN.    Camille Alves PA-C  [facility]

## 2019-04-03 ENCOUNTER — THERAPY VISIT (OUTPATIENT)
Dept: PHYSICAL THERAPY | Facility: CLINIC | Age: 29
End: 2019-04-03
Payer: OTHER GOVERNMENT

## 2019-04-03 DIAGNOSIS — M53.3 SI (SACROILIAC) JOINT DYSFUNCTION: ICD-10-CM

## 2019-04-03 DIAGNOSIS — M54.42 BILATERAL LOW BACK PAIN WITH LEFT-SIDED SCIATICA: ICD-10-CM

## 2019-04-03 PROCEDURE — 97530 THERAPEUTIC ACTIVITIES: CPT | Mod: GP | Performed by: PHYSICAL THERAPIST

## 2019-04-03 PROCEDURE — 97110 THERAPEUTIC EXERCISES: CPT | Mod: GP | Performed by: PHYSICAL THERAPIST

## 2019-04-03 PROCEDURE — 97161 PT EVAL LOW COMPLEX 20 MIN: CPT | Mod: GP | Performed by: PHYSICAL THERAPIST

## 2019-04-03 NOTE — PROGRESS NOTES
[facility] Initial Evaluation  Subjective:    Bhumika Gonsalez is a 29 year old female with a lumbar condition.  Occurance: running in soccer.  Condition occurred: during recreation/sport.  This is a new condition  Pt initially hurt back on 3/1/19. Recalls playing soccer and then the next day back was bothering her. Prolonged sitting worsens symptoms, along with getting up from sitting. Laying down and icing help..    Patient reports pain:  Lower lumbar spine.  Radiates to:  Gluteals left.  Pain is described as aching and is intermittent and reported as 4/10.   Pain is worse during the day.  Symptoms are exacerbated by bending and sitting and relieved by ice (laying down).  Since onset symptoms are unchanged.        General health as reported by patient is good.  Pertinent medical history includes:  None.  Medical allergies: no.  Other surgeries include:  No.  Current medications:  None as reported by the patient.  Current occupation is [employer]  .  Patient is working in normal job without restrictions.  Employment tasks: computer work.    Barriers include:  None as reported by the patient.    Red flags:  None as reported by the patient.                        Objective:  System         Lumbar/SI Evaluation  ROM:    AROM Lumbar:   Flexion:          Able to touch ankles  Ext:                    50%   Side Bend:        Left:     Right:   Rotation:           Left:     Right:   Side Glide:        Left:  100%    Right:  100%                  Neural Tension/Mobility:  Lumbar:  Normal                SI joint/Sacrum:    Negative Cluster Testing                                            Hip Evaluation  Hip PROM:                      Endfeel: moderate-severe limitation in B hip ext      Hip Strength:    Flexion:   Left: 5-/5   Pain:  Right: 5-/5   Pain:                    Extension:  Left: 5-/5  Pain:Right: 5-/5    Pain:    Abduction:  Left: 4/5     Pain:Right: 4/5    Pain:      External  Rotation:  Left: 5/5   Pain:  Right: 5/5   Pain:  Knee Flexion:  Left: 5-/5   Pain:Right: 5-/5   Pain:  Knee Extension:  Left: 5/5   Pain:Right: 5/5    Pain:                       General     ROS    Assessment/Plan:    Patient is a 29 year old female with lumbar complaints.    Patient has the following significant findings with corresponding treatment plan.                Diagnosis 1:  LBP  Pain -  manual therapy, splint/taping/bracing/orthotics, self management, education, directional preference exercise and home program  Decreased ROM/flexibility - manual therapy and therapeutic exercise  Decreased joint mobility - manual therapy and therapeutic exercise  Decreased strength - therapeutic exercise and therapeutic activities  Impaired balance - neuro re-education and therapeutic activities  Decreased proprioception - neuro re-education and therapeutic activities    Therapy Evaluation Codes:   1) History comprised of:   Personal factors that impact the plan of care:      None.    Comorbidity factors that impact the plan of care are:      None.     Medications impacting care: None.  2) Examination of Body Systems comprised of:   Body structures and functions that impact the plan of care:      Lumbar spine.   Activity limitations that impact the plan of care are:      Bending, Driving, Jumping, Lifting, Running, Sitting, Sports, Squatting/kneeling, Stairs, Standing, Walking and Working.  3) Clinical presentation characteristics are:   Stable/Uncomplicated.  4) Decision-Making    Low complexity using standardized patient assessment instrument and/or measureable assessment of functional outcome.  Cumulative Therapy Evaluation is: Low complexity.    Previous and current functional limitations:  (See Goal Flow Sheet for this information)    Short term and Long term goals: (See Goal Flow Sheet for this information)     Communication ability:  Patient appears to be able to clearly communicate and understand verbal and  written communication and follow directions correctly.  Treatment Explanation - The following has been discussed with the patient:   RX ordered/plan of care  Anticipated outcomes  Possible risks and side effects  This patient would benefit from PT intervention to resume normal activities.   Rehab potential is good.    Frequency:  1 X week, once daily  Duration:  for 10 weeks  Discharge Plan:  Achieve all LTG.  Independent in home treatment program.  Reach maximal therapeutic benefit.    Please refer to the daily flowsheet for treatment today, total treatment time and time spent performing 1:1 timed codes.

## 2019-04-18 ENCOUNTER — THERAPY VISIT (OUTPATIENT)
Dept: PHYSICAL THERAPY | Facility: CLINIC | Age: 29
End: 2019-04-18
Payer: OTHER GOVERNMENT

## 2019-04-18 DIAGNOSIS — M54.50 BILATERAL LOW BACK PAIN WITHOUT SCIATICA: Primary | ICD-10-CM

## 2019-04-18 PROCEDURE — 97110 THERAPEUTIC EXERCISES: CPT | Mod: GP | Performed by: PHYSICAL THERAPIST

## 2019-05-29 PROBLEM — M54.42 BILATERAL LOW BACK PAIN WITH LEFT-SIDED SCIATICA: Status: RESOLVED | Noted: 2019-04-03 | Resolved: 2019-05-29

## 2019-05-29 NOTE — PROGRESS NOTES
DISCHARGE SUMMARY    Bhumika Gonsalez was seen 2 times for evaluation and treatment.  Patient did not return for further treatment and current status is unknown.  Due to short treatment duration, no objective or functional changes were made.  Please see goal flow sheet from episode noted date below and initial evaluation for further information.  Patient is discharged from therapy and therapy episode is resolved as of 05/29/19.      No linked episodes

## 2019-08-21 ENCOUNTER — OFFICE VISIT (OUTPATIENT)
Dept: OBGYN | Facility: CLINIC | Age: 29
End: 2019-08-21
Payer: OTHER GOVERNMENT

## 2019-08-21 VITALS
WEIGHT: 166 LBS | BODY MASS INDEX: 26.68 KG/M2 | SYSTOLIC BLOOD PRESSURE: 110 MMHG | DIASTOLIC BLOOD PRESSURE: 90 MMHG | HEIGHT: 66 IN

## 2019-08-21 DIAGNOSIS — Z01.419 ENCOUNTER FOR GYNECOLOGICAL EXAMINATION WITHOUT ABNORMAL FINDING: Primary | ICD-10-CM

## 2019-08-21 DIAGNOSIS — N92.6 IRREGULAR MENSES: ICD-10-CM

## 2019-08-21 PROCEDURE — 99395 PREV VISIT EST AGE 18-39: CPT | Performed by: OBSTETRICS & GYNECOLOGY

## 2019-08-21 RX ORDER — LETROZOLE 2.5 MG/1
5 TABLET, FILM COATED ORAL DAILY
Qty: 10 TABLET | Refills: 0 | Status: SHIPPED | OUTPATIENT
Start: 2019-08-21 | End: 2020-01-07

## 2019-08-21 RX ORDER — MEDROXYPROGESTERONE ACETATE 10 MG
10 TABLET ORAL DAILY
Qty: 10 TABLET | Refills: 3 | Status: SHIPPED | OUTPATIENT
Start: 2019-08-21 | End: 2019-12-05

## 2019-08-21 SDOH — HEALTH STABILITY: MENTAL HEALTH: HOW MANY STANDARD DRINKS CONTAINING ALCOHOL DO YOU HAVE ON A TYPICAL DAY?: 1 OR 2

## 2019-08-21 SDOH — HEALTH STABILITY: MENTAL HEALTH: HOW OFTEN DO YOU HAVE 6 OR MORE DRINKS ON ONE OCCASION?: NEVER

## 2019-08-21 SDOH — HEALTH STABILITY: MENTAL HEALTH: HOW OFTEN DO YOU HAVE A DRINK CONTAINING ALCOHOL?: 2-3 TIMES A WEEK

## 2019-08-21 ASSESSMENT — MIFFLIN-ST. JEOR: SCORE: 1504.72

## 2019-08-21 NOTE — PATIENT INSTRUCTIONS
Cycle instructions:    The first day of bleeding/period is called Day 1.    Letrozole is taken Days 3-7 of cycle, about same time each day.  Take the medication even if you are still bleeding.    Call when you get your period to schedule an ultrasound to check for follicles on your ovaries.  This should be done about Day 12-14 of cycle.  (we do not have ultrasound on the weekends)  The phone number is [phone] and listen to prompts for the surgery and ultrasound .      Start testing for ovulation using the store bought ovulation predictor kits on Day 11 of cycle.  When you get a positive surge, you will most likely be ovulating within the next 24 hours.       Test the urine about the same time each day.  (should try to test between 2-4 pm, empty bladder about noon)         The test is positive when you see 2 dark solid lines, or a smiley face.  (one faint line and one dark line is NOT positive)         Once the test is positive, you can stop testing.  Have sex/intercourse the day the test is positive.  The next day, have sex again.    Schedule a lab only appointment for about 7-9 days after your ovulation test is positive to check the progesterone level and make sure ovulation occurred.    Make appointment to come back to clinic for infertility follow-up visit about Day 24-26 of cycle so we can review all of the labs and ultrasound results and make dose adjustments if needed.     You can also do an E Visit if you are a my chart patient.   Start the visit by telling me your LMP (last menstrual period), any side effects of letrozole and cycle day of your LH surge.    If you don't have a period by 15 days after LH kit is positive (surge) then do urine pregnancy test and call/email clinic if positive.      Basic info:  The ovulation predictor kits are found in the condom/tampon section of the store.  Clear blue easy brand is simple to read.  Most women will get a positive LH surge before day 20 of the  cycle.  Letrozole is not FDA approved, but limited studies showing pregnancy rate higher than clomid especially when clomid has failed.  Aromatase inhibitors are generally well tolerated. The main side effects are hot flushes and gastrointestinal events (nausea and vomiting), headache, back pain, and leg cramps.  Take it at same time each night to minimize side effects.  There is a chance of twins when taking letrozole as well as making too many cysts on the ovaries.  Do not use lubricants with intercourse when trying to get pregnant. (Pre-Seed, etc is okay)        Schedule an ultrasound for about day 12-14 of the cycle and see if you have a 2 cm follicle on the ovary and about 9 mm endometrium.  Typically follicles grow 2 mm/day so we can  when ovulation should be happening/ready based on the ultrasound and sometimes may need a repeat ultrasound done a few days after last ultrasound to confirm this is happening.  IF it looks like you should be ovulating but have not surged on your own (+OPK), would do an HCG bump to trigger ovulation.      If we do HCG bump, recommend nightly prometrium 200 mg by mouth to support luteal phase.  If no menses 15 days from HCG shot, do pregnancy test and call/email if positive.  If you are pregnant we will continue prometrium until about 10 weeks along.   We will also schedule an early ultrasound for about 7-8 weeks.   DO NOT do pregnancy test before 2 weeks after the HCG shot as it will often be a false positive.    If pregnancy test is negative, then stop prometrium.

## 2019-08-21 NOTE — PROGRESS NOTES
Bhumika is a 29 year old  female who presents for annual exam.     Menses are irregular and normal lasting 5 days.  Menses flow: light.  No LMP recorded.. Using none for contraception.  She is currently considering pregnancy.  Besides routine health maintenance,  she would like to discuss trying to get pregnant.  We had removed Nexplanon 2018 and I saw her 2019 for amenorrhea.  Labs have been normal.  We did a Provera withdrawal so she had a menses in April.  Had one more spontaneous menses the end of July and nothing since then.  Really would like to be pregnant and is taking a multivitamin.  Prior to the Nexplanon had had regular cycles.   in 34 y/o. No meds, no prior children, no tobacco or drug use.   GYNECOLOGIC HISTORY:  Menarche: 14  Bhumika is sexually active with 1male partner(s) and is currently in monogamous relationship.    History sexually transmitted infections:No STD history  STI testing offered?  Declined  BREANNE exposure: Unknown  History of abnormal Pap smear: NO - age 21-29 PAP every 3 years recommended  Family history of breast CA: No  Family history of uterine/ovarian CA: No    Family history of colon CA: No    HEALTH MAINTENANCE:  Cholesterol: (  Cholesterol   Date Value Ref Range Status   2018 173 <200 mg/dL Final    History of abnormal lipids: Yes  Mammo: na . History of abnormal Mammo: Not applicable.  Regular Self Breast Exams: Yes  Calcium/Vitamin D intake: source:  dairy, multivitamin Adequate? No  TSH: (  TSH   Date Value Ref Range Status   2018 1.51 0.40 - 4.00 mU/L Final    )  Pap; (  Lab Results   Component Value Date    PAP NIL 2018    )    HISTORY:  OB History    Para Term  AB Living   0 0 0 0 0 0   SAB TAB Ectopic Multiple Live Births   0 0 0 0 0     Past Medical History:   Diagnosis Date     Alopecia universalis      Asthma     cats and humidity      Past Surgical History:   Procedure Laterality Date     DENTAL SURGERY       wisdom teeth     Family History   Problem Relation Age of Onset     Hypertension Father      Hyperlipidemia Father      Cancer No family hx of      Social History     Socioeconomic History     Marital status: Single     Spouse name: Not on file     Number of children: Not on file     Years of education: Not on file     Highest education level: Not on file   Occupational History     Not on file   Social Needs     Financial resource strain: Not on file     Food insecurity:     Worry: Not on file     Inability: Not on file     Transportation needs:     Medical: Not on file     Non-medical: Not on file   Tobacco Use     Smoking status: Never Smoker     Smokeless tobacco: Never Used   Substance and Sexual Activity     Alcohol use: Yes     Drug use: No     Sexual activity: Yes     Partners: Male     Birth control/protection: Condom   Lifestyle     Physical activity:     Days per week: Not on file     Minutes per session: Not on file     Stress: Not on file   Relationships     Social connections:     Talks on phone: Not on file     Gets together: Not on file     Attends Alevism service: Not on file     Active member of club or organization: Not on file     Attends meetings of clubs or organizations: Not on file     Relationship status: Not on file     Intimate partner violence:     Fear of current or ex partner: Not on file     Emotionally abused: Not on file     Physically abused: Not on file     Forced sexual activity: Not on file   Other Topics Concern     Parent/sibling w/ CABG, MI or angioplasty before 65F 55M? No   Social History Narrative     Not on file       Current Outpatient Medications:      albuterol (PROAIR HFA/PROVENTIL HFA/VENTOLIN HFA) 108 (90 Base) MCG/ACT inhaler, Inhale 2 puffs into the lungs every 6 hours as needed for shortness of breath / dyspnea or wheezing, Disp: 1 Inhaler, Rfl: 0   No Known Allergies    Past medical, surgical, social and family history were reviewed and updated in  "EPIC.      EXAM:  BP (!) 110/90   Ht 1.676 m (5' 6\")   Wt 75.3 kg (166 lb)   BMI 26.79 kg/m     BMI: Body mass index is 26.79 kg/m .  Constitutional: healthy, alert and no distress  Head: Normocephalic. No masses, lesions, tenderness or abnormalities  Neck: Neck supple. Trachea midline. No adenopathy. Thyroid symmetric, normal size.   Cardiovascular: RRR.   Respiratory: Negative.   Breast: Breasts reveal mild symmetric fibrocystic densities, but there are no dominant, discrete, fixed or suspicious masses found.  Gastrointestinal: Abdomen soft, non-tender, non-distended. No masses, organomegaly.  : deferred  Musculoskeletal: extremities normal  Skin: no suspicious lesions or rashes  Psychiatric: Affect appropriate, cooperative,mentation appears normal.     COUNSELING:   Reviewed preventive health counseling, as reflected in patient instructions       Folic Acid Counseling   reports that she has never smoked. She has never used smokeless tobacco.    Body mass index is 26.79 kg/m .  Weight management plan: Not addressed  FRAX Risk Assessment    ASSESSMENT:  29 year old female with satisfactory annual exam  (Z01.419) Encounter for gynecological examination without abnormal finding  (primary encounter diagnosis)  Comment: Trying for pregnancy  Plan: Not due for labs or Pap smear.  Next Pap due in 2021.    (N92.6) Irregular menses  Comment: Since Nexplanon removed  Plan: medroxyPROGESTERone (PROVERA) 10 MG tablet,         letrozole (FEMARA) 2.5 MG tablet, US Pelvic         Complete with Transvaginal, Progesterone        We will do Provera withdrawal bleed and then discussed ovulation induction medication. Discussed trying Letrozole for ovulation induction.  Not FDA approved, but limited studies showing pregnancy rate higher than clomid evin. When clomid has failed in PCOS patients.  Aromatase inhibitors are generally well tolerated. The main side effects are hot flushes and gastrointestinal events (nausea and " vomiting), headache, back pain, and leg cramps.   Patient would like to try Letrozol 5mg days 3-7 of cycle.  Told to take it at same time each night and will call with menses to schedule follicle check about day 12-14 of cycle.  Start LH testing day 11.  Questions answered.  Will call/email with side effects or questions.  hCG bump was discussed.    Discussed if not pregnant within 3 cycles, would schedule semen analysis and HSG.    Laxmi Elizabeth MD

## 2019-10-05 DIAGNOSIS — Z00.00 ROUTINE GENERAL MEDICAL EXAMINATION AT A HEALTH CARE FACILITY: ICD-10-CM

## 2019-10-05 NOTE — TELEPHONE ENCOUNTER
"Requested Prescriptions   Pending Prescriptions Disp Refills     albuterol (PROAIR HFA/PROVENTIL HFA/VENTOLIN HFA) 108 (90 Base) MCG/ACT inhaler [Pharmacy Med Name: ALBUTEROL HFA INH (200 PUFFS) 8.5GM]    Last Written Prescription Date:  09/18/2018  Last Fill Quantity: 1 inhaler,  # refills: 0   Last office visit: 4/1/2019 with prescribing provider:  Joselyn   Future Office Visit:     8.5 g 0     Sig: INHALE 2 PUFFS INTO THE LUNGS EVERY 6 HOURS AS NEEDED FOR SHORTNESS OF BREATH/DYPNEA OR WHEEZING       Asthma Maintenance Inhalers - Anticholinergics Failed - 10/5/2019 11:37 AM        Failed - Asthma control assessment score within normal limits in last 6 months     Please review ACT score.   No flowsheet data found.            Failed - Recent (6 mo) or future (30 days) visit within the authorizing provider's specialty     Patient had office visit in the last 6 months or has a visit in the next 30 days with authorizing provider or within the authorizing provider's specialty.  See \"Patient Info\" tab in inbasket, or \"Choose Columns\" in Meds & Orders section of the refill encounter.            Passed - Patient is age 12 years or older        Passed - Medication is active on med list           "

## 2019-10-07 RX ORDER — ALBUTEROL SULFATE 90 UG/1
AEROSOL, METERED RESPIRATORY (INHALATION)
Qty: 8.5 G | Refills: 0 | Status: SHIPPED | OUTPATIENT
Start: 2019-10-07 | End: 2020-03-16

## 2019-10-07 NOTE — TELEPHONE ENCOUNTER
Routing refill request to provider for review/approval because:  Labs not current:  ACT  Patient needs to be seen because:  Patient has not been seen within last 6 months with PCP.    PATTY HawkinsN, RN  [employer] [facility]

## 2019-10-29 ENCOUNTER — TELEPHONE (OUTPATIENT)
Dept: OBGYN | Facility: CLINIC | Age: 29
End: 2019-10-29

## 2019-10-29 ENCOUNTER — ANCILLARY PROCEDURE (OUTPATIENT)
Dept: ULTRASOUND IMAGING | Facility: CLINIC | Age: 29
End: 2019-10-29
Attending: OBSTETRICS & GYNECOLOGY
Payer: OTHER GOVERNMENT

## 2019-10-29 DIAGNOSIS — N92.6 IRREGULAR MENSES: ICD-10-CM

## 2019-10-29 DIAGNOSIS — N92.6 IRREGULAR MENSES: Primary | ICD-10-CM

## 2019-10-29 PROCEDURE — 76830 TRANSVAGINAL US NON-OB: CPT | Performed by: OBSTETRICS & GYNECOLOGY

## 2019-10-29 NOTE — TELEPHONE ENCOUNTER
TC to pt to discuss follicle check US results.  Pt has 14mm follicle and needs repeat US on Friday, 11/1 per Dr. Elizabeth.  Pt verbalized understanding.  Will work on getting pt scheduled.  Lavinia Jorge RN

## 2019-11-01 ENCOUNTER — ANCILLARY PROCEDURE (OUTPATIENT)
Dept: ULTRASOUND IMAGING | Facility: CLINIC | Age: 29
End: 2019-11-01
Attending: OBSTETRICS & GYNECOLOGY
Payer: OTHER GOVERNMENT

## 2019-11-01 ENCOUNTER — ALLIED HEALTH/NURSE VISIT (OUTPATIENT)
Dept: NURSING | Facility: CLINIC | Age: 29
End: 2019-11-01
Payer: OTHER GOVERNMENT

## 2019-11-01 DIAGNOSIS — N92.6 IRREGULAR MENSES: ICD-10-CM

## 2019-11-01 DIAGNOSIS — N92.6 IRREGULAR MENSES: Primary | ICD-10-CM

## 2019-11-01 PROCEDURE — 96372 THER/PROPH/DIAG INJ SC/IM: CPT

## 2019-11-01 PROCEDURE — 76830 TRANSVAGINAL US NON-OB: CPT | Performed by: OBSTETRICS & GYNECOLOGY

## 2019-11-01 RX ORDER — CHORIONIC GONADOTROPIN 10000 UNIT
10000 KIT INTRAMUSCULAR ONCE
Status: COMPLETED | OUTPATIENT
Start: 2019-11-01 | End: 2019-11-01

## 2019-11-01 RX ADMIN — CHORIONIC GONADOTROPIN 10000 UNITS: KIT at 09:00

## 2019-11-01 NOTE — PROGRESS NOTES
Pt here for hcg trigger injection.  Right follicle, 2.0cm. Endometrium 8.5mm. Has not noted positive ovulation.  Discussed timing of intercourse, starting progesterone, delaying pregnancy test for 2 weeks, and requesting an e-visit with Dr. Elizabeth prior to the end of the cycle.  Pt understands all instructions and will call if she has any questions/concerns.    Amanda Javier RN-BSN

## 2019-11-02 ENCOUNTER — HEALTH MAINTENANCE LETTER (OUTPATIENT)
Age: 29
End: 2019-11-02

## 2019-12-05 ENCOUNTER — PRENATAL OFFICE VISIT (OUTPATIENT)
Dept: NURSING | Facility: CLINIC | Age: 29
End: 2019-12-05
Payer: OTHER GOVERNMENT

## 2019-12-05 VITALS
SYSTOLIC BLOOD PRESSURE: 119 MMHG | HEART RATE: 93 BPM | BODY MASS INDEX: 28.12 KG/M2 | TEMPERATURE: 98.1 F | HEIGHT: 66 IN | DIASTOLIC BLOOD PRESSURE: 73 MMHG | WEIGHT: 175 LBS

## 2019-12-05 DIAGNOSIS — Z34.00 SUPERVISION OF NORMAL FIRST PREGNANCY: Primary | ICD-10-CM

## 2019-12-05 DIAGNOSIS — Z23 NEED FOR TDAP VACCINATION: ICD-10-CM

## 2019-12-05 LAB
ABO + RH BLD: NORMAL
ABO + RH BLD: NORMAL
ALBUMIN UR-MCNC: NEGATIVE MG/DL
APPEARANCE UR: CLEAR
BILIRUB UR QL STRIP: NEGATIVE
BLD GP AB SCN SERPL QL: NORMAL
BLOOD BANK CMNT PATIENT-IMP: NORMAL
COLOR UR AUTO: YELLOW
ERYTHROCYTE [DISTWIDTH] IN BLOOD BY AUTOMATED COUNT: 14 % (ref 10–15)
GLUCOSE UR STRIP-MCNC: NEGATIVE MG/DL
HCT VFR BLD AUTO: 40.6 % (ref 35–47)
HGB BLD-MCNC: 13.6 G/DL (ref 11.7–15.7)
HGB UR QL STRIP: ABNORMAL
KETONES UR STRIP-MCNC: NEGATIVE MG/DL
LEUKOCYTE ESTERASE UR QL STRIP: NEGATIVE
MCH RBC QN AUTO: 29.6 PG (ref 26.5–33)
MCHC RBC AUTO-ENTMCNC: 33.5 G/DL (ref 31.5–36.5)
MCV RBC AUTO: 88 FL (ref 78–100)
NITRATE UR QL: NEGATIVE
PH UR STRIP: 6.5 PH (ref 5–7)
PLATELET # BLD AUTO: 360 10E9/L (ref 150–450)
RBC # BLD AUTO: 4.6 10E12/L (ref 3.8–5.2)
SOURCE: ABNORMAL
SP GR UR STRIP: <=1.005 (ref 1–1.03)
SPECIMEN EXP DATE BLD: NORMAL
UROBILINOGEN UR STRIP-ACNC: 0.2 EU/DL (ref 0.2–1)
WBC # BLD AUTO: 9 10E9/L (ref 4–11)

## 2019-12-05 PROCEDURE — 86780 TREPONEMA PALLIDUM: CPT | Performed by: OBSTETRICS & GYNECOLOGY

## 2019-12-05 PROCEDURE — 83021 HEMOGLOBIN CHROMOTOGRAPHY: CPT | Mod: 90 | Performed by: OBSTETRICS & GYNECOLOGY

## 2019-12-05 PROCEDURE — 36415 COLL VENOUS BLD VENIPUNCTURE: CPT | Performed by: OBSTETRICS & GYNECOLOGY

## 2019-12-05 PROCEDURE — 87086 URINE CULTURE/COLONY COUNT: CPT | Performed by: OBSTETRICS & GYNECOLOGY

## 2019-12-05 PROCEDURE — 86901 BLOOD TYPING SEROLOGIC RH(D): CPT | Performed by: OBSTETRICS & GYNECOLOGY

## 2019-12-05 PROCEDURE — 99207 ZZC NO CHARGE NURSE ONLY: CPT

## 2019-12-05 PROCEDURE — 82306 VITAMIN D 25 HYDROXY: CPT | Performed by: OBSTETRICS & GYNECOLOGY

## 2019-12-05 PROCEDURE — 86762 RUBELLA ANTIBODY: CPT | Performed by: OBSTETRICS & GYNECOLOGY

## 2019-12-05 PROCEDURE — 85027 COMPLETE CBC AUTOMATED: CPT | Performed by: OBSTETRICS & GYNECOLOGY

## 2019-12-05 PROCEDURE — 99000 SPECIMEN HANDLING OFFICE-LAB: CPT | Performed by: OBSTETRICS & GYNECOLOGY

## 2019-12-05 PROCEDURE — 87389 HIV-1 AG W/HIV-1&-2 AB AG IA: CPT | Performed by: OBSTETRICS & GYNECOLOGY

## 2019-12-05 PROCEDURE — 81003 URINALYSIS AUTO W/O SCOPE: CPT | Performed by: OBSTETRICS & GYNECOLOGY

## 2019-12-05 PROCEDURE — 87340 HEPATITIS B SURFACE AG IA: CPT | Performed by: OBSTETRICS & GYNECOLOGY

## 2019-12-05 PROCEDURE — 86900 BLOOD TYPING SEROLOGIC ABO: CPT | Performed by: OBSTETRICS & GYNECOLOGY

## 2019-12-05 PROCEDURE — 86850 RBC ANTIBODY SCREEN: CPT | Performed by: OBSTETRICS & GYNECOLOGY

## 2019-12-05 SDOH — SOCIAL STABILITY: SOCIAL INSECURITY
WITHIN THE LAST YEAR, HAVE TO BEEN RAPED OR FORCED TO HAVE ANY KIND OF SEXUAL ACTIVITY BY YOUR PARTNER OR EX-PARTNER?: NO

## 2019-12-05 SDOH — SOCIAL STABILITY: SOCIAL INSECURITY
WITHIN THE LAST YEAR, HAVE YOU BEEN KICKED, HIT, SLAPPED, OR OTHERWISE PHYSICALLY HURT BY YOUR PARTNER OR EX-PARTNER?: NO

## 2019-12-05 SDOH — SOCIAL STABILITY: SOCIAL INSECURITY: WITHIN THE LAST YEAR, HAVE YOU BEEN HUMILIATED OR EMOTIONALLY ABUSED IN OTHER WAYS BY YOUR PARTNER OR EX-PARTNER?: NO

## 2019-12-05 SDOH — HEALTH STABILITY: MENTAL HEALTH
STRESS IS WHEN SOMEONE FEELS TENSE, NERVOUS, ANXIOUS, OR CAN'T SLEEP AT NIGHT BECAUSE THEIR MIND IS TROUBLED. HOW STRESSED ARE YOU?: NOT AT ALL

## 2019-12-05 SDOH — SOCIAL STABILITY: SOCIAL INSECURITY: WITHIN THE LAST YEAR, HAVE YOU BEEN AFRAID OF YOUR PARTNER OR EX-PARTNER?: NO

## 2019-12-05 ASSESSMENT — MIFFLIN-ST. JEOR: SCORE: 1545.54

## 2019-12-05 NOTE — PROGRESS NOTES
Important Information for Provider:     Patient presents for new ob teaching and labs, first pregnancy.  Infertility patient, unknown LMP. Ultrasound scheduled for 12/20/19 with Dr Baker to review after. Discussed first trimester screening/NIPT. Handouts reviewed and given. Recommended B6, Unisom for nausea. Has NOB with Dr Elizabeth 1/07/20 at [facility].     Caffeine intake/servings daily - 0  Calcium intake/servings daily - 3  Exercise 5 times weekly - describe ; lifts, cardio, precautions given  Sunscreen used - Yes  Seatbelts used - Yes  Guns stored in the home - No  Self Breast Exam - Yes  Pap test up to date -  Yes  Eye exam up to date -  Yes  Dental exam up to date -  Yes  Immunizations reviewed and up to date - Yes  Abuse: Current or Past (Physical, Sexual or Emotional) - No  Do you feel safe in your environment - Yes  Do you cope well with stress - Yes  Do you suffer from insomnia - No        Prenatal OB Questionnaire  Patient supplied answers from flow sheet for:  Prenatal OB Questionnaire.  Past Medical History  Diabetes?: No  Hypertension : No  Heart disease, mitral valve prolapse or rheumatic fever?: No  An autoimmune disease such as lupus or rheumatoid arthritis?: No  Kidney disease or urinary tract infection?: No  Epilepsy, seizures or spells?: No  Migraine headaches?: No  A stroke or loss of function or sensation?: No  Any other neurological problems?: No  Have you ever been treated for depression?: No  Are you having problems with crying spells or loss of self-esteem?: No  Have you ever required psychiatric care?: No  Have you ever had hepatitis, liver disease or jaundice?: No  Have you been treated for blood clots in your veins, deep vein thromosis, inflammation in the veins, thrombosis, phlebitis, pulmonary embolism or varicosities?: No  Have you had excessive bleeding after surgery or dental work?: No  Do you bleed more than other women after a cut or scratch?: No  Do you have a  history of anemia?: No  Have you ever had thyroid problems or taken thyroid medication?: No   Do you have any endocrine problems?: No  Have you ever been in a major accident or suffered serious trauma?: No  Within the last year, has anyone hit, slapped, kicked or otherwise hurt you?: No  In the last year, has anyone forced you to have sex when you didn't want to?: No    Past Medical History 2   Have you ever received a blood transfusion?: No  Would you refuse a blood transfusion if a doctor judged it to be medically necessary?: No   If you answered Yes, would you rather die than receive a blood transfusion?: No  If you answered Yes, is this for Catholic reasons?: No  Does anyone in your home smoke?: No  Do you use tobacco products?: No  Do you drink beer, wine or hard liquor?: No  Do you use any of the following: marijuana, speed, cocaine, heroin, hallucinogens or other drugs?: No   Is your blood type Rh negative?: No  Have you ever had abnormal antibodies in your blood?: No  Have you ever had asthma?: Yes  Have you ever had tuberculosis?: No  Do you have any allergies to drugs or over-the-counter medications?: No  Allergies: Dust Mites, Aspartame, Ethanol, Venlafaxine, Hydrochloride, Sertraline: cats  Have you had any breast problems?: No  Have you ever ?: No  Have you had any gynecological surgical procedures such as cervical conization, a LEEP procedure, laser treatment, cryosurgery of the cervix or a dilation and curettage, etc?: No  Have you ever had any other surgical procedures?:wisdom teeth  Have you been hospitalized for a nonsurgical reason excluding normal delivery?: No  Have you ever had any anesthetic complications?: No  Have you ever had an abnormal pap smear?: No    Past Medical History (Continued)  Do you have a history of abnormalities of the uterus?: No  Did your mother take BREANNE or any other hormones when she was pregnant with you?: No  Did it take you more than a year to become  pregnant?: (!) Yes  Have you ever been evaluated or treated for infertility?: (!) YES  Is there a history of medical problems in your family, which you feel may be important to this pregnancy?: No  Do you have any other problems we have not asked about which you feel may be important to this pregnancy?: No    Symptoms since last menstrual period  Do you have any of the following symptoms: abdominal pain, blood in stools or urine, chest pain, shortness of breath, coughing or vomiting up blood, your heart racing or skipping beats, nausea and vomiting, pain on urination or vaginal discharge or bleed: (!) Yes  Will the patient be 35 years old or older at the time of delivery?: No    Has the patient, baby's father or anyone in either family had:  Thalassemia (Italian, Greek, Mediterranean or  background only) and an MCV result less than 80?: No  Neural tube defect such as meningomyelocele, spina bifida or anencephaly?: No  Congenital heart defect?: No  Down's Syndrome?: No  Vishal-Sachs disease (Restorationist, Cajun, Tanzanian-Niuean)?: No  Sickle cell disease or trait ()?: No  Hemophilia or other inherited problems of blood?: No  Muscular dystrophy?: No  Cystic fibrosis?: No  Austin's chorea?: No  Mental retardation/autism?: No  If yes, was the person tested for fragile X?: No  Any other inherited genetic or chromosomal disorder?: No  Maternal metabolic disorder (e.g Insulin-dependent diabetes, PKU)?: No  A child with birth defects not listed above?: No  Recurrent pregnancy loss or stillbirth?: No   Has the patient had any medications/street drugs/alcohol since her last menstrual period?: No  Does the patient or baby's father have any other genetic risks?: No    Infection History   Do you object to being tested for Hepatitis B?: No  Do you object to being tested for HIV?: No   Do you feel that you are at high risk for coming in contact with the AIDS virus?: No  Have you ever been treated for tuberculosis?: No  Have  you ever had a positive skin test for tuberculosis?: No  Do you live with someone who has tuberculosis?: No  Have you ever been exposed to tuberculosis?: No  Do you have genital herpes?: No  Does your partner have genital herpes?: No  Have you had a viral illness since your last period?: No  Have you ever had gonorrhea, chlamydia, syphilis, venereal warts, trichomoniasis, pelvic inflammatory disease or any other sexually transmitted disease?: No  Do you know if you are a genital group B streptococcus carrier?: No  Have you had chicken pox/varicella?: (!) Yes   Have you been vaccinated against chicken Pox?: No  Have you had any other infectious diseases?: No      Allergies as of 12/5/2019:    Allergies as of 12/05/2019     (No Known Allergies)       Current medications are:  Current Outpatient Medications   Medication Sig Dispense Refill     albuterol (PROAIR HFA/PROVENTIL HFA/VENTOLIN HFA) 108 (90 Base) MCG/ACT inhaler INHALE 2 PUFFS INTO THE LUNGS EVERY 6 HOURS AS NEEDED FOR SHORTNESS OF BREATH/DYPNEA OR WHEEZING 8.5 g 0     letrozole (FEMARA) 2.5 MG tablet Take 2 tablets (5 mg) by mouth daily days 3-7 of cycle 10 tablet 0     progesterone (PROMETRIUM) 200 MG capsule Take 1 capsule (200 mg) by mouth At Bedtime 90 capsule 1         Early ultrasound screening tool:    Does patient have irregular periods?  No  Did patient use hormonal birth control in the three months prior to positive urine pregnancy test? No  Is the patient breastfeeding?  No  Is the patient 10 weeks or greater at time of education visit?  No

## 2019-12-06 LAB
BACTERIA SPEC CULT: NORMAL
DEPRECATED CALCIDIOL+CALCIFEROL SERPL-MC: 21 UG/L (ref 20–75)
HBV SURFACE AG SERPL QL IA: NONREACTIVE
HIV 1+2 AB+HIV1 P24 AG SERPL QL IA: NONREACTIVE
RUBV IGG SERPL IA-ACNC: 28 IU/ML
SPECIMEN SOURCE: NORMAL
T PALLIDUM AB SER QL: NONREACTIVE

## 2019-12-07 LAB
HGB A1 MFR BLD: 96.4 % (ref 95–97.9)
HGB A2 MFR BLD: 2.7 % (ref 2–3.5)
HGB C MFR BLD: 0 % (ref 0–0)
HGB E MFR BLD: 0 % (ref 0–0)
HGB F MFR BLD: 0.9 % (ref 0–2.1)
HGB FRACT BLD ELPH-IMP: NORMAL
HGB OTHER MFR BLD: 0 % (ref 0–0)
HGB S BLD QL SOLY: NORMAL
HGB S MFR BLD: 0 % (ref 0–0)
PATH INTERP BLD-IMP: NORMAL

## 2019-12-20 ENCOUNTER — ANCILLARY PROCEDURE (OUTPATIENT)
Dept: ULTRASOUND IMAGING | Facility: CLINIC | Age: 29
End: 2019-12-20
Attending: OBSTETRICS & GYNECOLOGY
Payer: OTHER GOVERNMENT

## 2019-12-20 ENCOUNTER — OFFICE VISIT (OUTPATIENT)
Dept: OBGYN | Facility: CLINIC | Age: 29
End: 2019-12-20
Payer: OTHER GOVERNMENT

## 2019-12-20 VITALS
HEART RATE: 95 BPM | TEMPERATURE: 97.3 F | SYSTOLIC BLOOD PRESSURE: 126 MMHG | BODY MASS INDEX: 28.41 KG/M2 | WEIGHT: 176 LBS | DIASTOLIC BLOOD PRESSURE: 71 MMHG

## 2019-12-20 DIAGNOSIS — Z34.90 EARLY STAGE OF PREGNANCY: Primary | ICD-10-CM

## 2019-12-20 DIAGNOSIS — Z34.00 SUPERVISION OF NORMAL FIRST PREGNANCY: ICD-10-CM

## 2019-12-20 PROCEDURE — 99212 OFFICE O/P EST SF 10 MIN: CPT | Performed by: OBSTETRICS & GYNECOLOGY

## 2019-12-20 PROCEDURE — 76815 OB US LIMITED FETUS(S): CPT | Performed by: OBSTETRICS & GYNECOLOGY

## 2019-12-20 NOTE — NURSING NOTE
"Chief Complaint   Patient presents with     Follow Up     Ultrasound       Initial /71 (BP Location: Left arm, Patient Position: Sitting, Cuff Size: Adult Regular)   Pulse 95   Temp 97.3  F (36.3  C) (Oral)   Wt 79.8 kg (176 lb)   LMP 10/18/2019   BMI 28.41 kg/m   Estimated body mass index is 28.41 kg/m  as calculated from the following:    Height as of 19: 1.676 m (5' 6\").    Weight as of this encounter: 79.8 kg (176 lb).  BP completed using cuff size: regular    Questioned patient about current smoking habits.  Pt. has never smoked.          The following HM Due: NONE      The following patient reported/Care Every where data was sent to:  P ABSTRACT QUALITY INITIATIVES [[id number]]  LINNEA Mendez MA           "

## 2019-12-24 NOTE — PROGRESS NOTES
GYN clinic visit  2019    CC: ultrasound follow up    HPI:   Bhumika Gonsalez is a 29 year old  who presents to clinic today to discuss ultrasound results.     Has some nausea, but no vomiting.  Declines need for any intervention.  No bleeding or cramping.    Reviewed GYN hx, OB hx, past medical hx, surgical hx and family hx.   Reviewed medications and allergies. Changes made in EPIC.     OBJECTIVE:  Vitals:    19 1501   BP: 126/71   BP Location: Left arm   Patient Position: Sitting   Cuff Size: Adult Regular   Pulse: 95   Temp: 97.3  F (36.3  C)   TempSrc: Oral   Weight: 79.8 kg (176 lb)     Body mass index is 28.41 kg/m .    Gen: alert, oriented, no distress, cooperative and pleasant  Psych:  Appropriate mood and affect  Neuro:  Gait WNL.  Sensation and strength grossly intact    -------  Obstetrical Ultrasound Report  OB 1st trimester U/S -  Transabdominal  [facility]  Referring Provider: Laxmi Elizabeth MD  Sonographer: Hyun Land RDMS  Indication:  Size and Dates     Dating (mm/dd/yyyy):   LMP:  10/18/2019.              EDC: 2020               GA by LMP:        9w0d     Current Scan On:  2019        EDC:  2020            GA by Current Scan:        8w6d  The calculation of the gestational age by current scan was based on CRL.  Anatomy Scan:  Willis gestation.  Biometry:  CRL                       2.1cm                   8w6d                      Yolk Sac               visualized                                               Fetal heart activity:  Rate and rhythm is within normal limits.  Fetal heart rate: 182 bpm        Maternal Structures:  Cervix: The cervix appears long and closed.  Right Ovary: not seen   Left Ovary: not seen      Impression:   Early willis IUP with cardiac activity, measures 8w 6d by today's ultrasound, EDC consistent with menstrual dating.     Flakita Baker MD  ----------    ASSESSMENT:   Bhumika Gonsalez is a 29 year  old  here to discuss ultrasound results.       PLAN:  1. Early stage of pregnancy  Reviewed reassuring US results and normal prenatal labs.   Declines interventions for nausea at this point  Has already had RN education visit.  NOB appointment scheduled.      Return to clinic for schedule NOB visit, sooner PRN.    Flakita Baker MD

## 2020-01-07 ENCOUNTER — TELEPHONE (OUTPATIENT)
Dept: MATERNAL FETAL MEDICINE | Facility: CLINIC | Age: 30
End: 2020-01-07

## 2020-01-07 ENCOUNTER — AMBULATORY - HEALTHEAST (OUTPATIENT)
Dept: MATERNAL FETAL MEDICINE | Facility: HOSPITAL | Age: 30
End: 2020-01-07

## 2020-01-07 ENCOUNTER — PRENATAL OFFICE VISIT (OUTPATIENT)
Dept: OBGYN | Facility: CLINIC | Age: 30
End: 2020-01-07
Payer: OTHER GOVERNMENT

## 2020-01-07 VITALS
WEIGHT: 175 LBS | DIASTOLIC BLOOD PRESSURE: 76 MMHG | HEIGHT: 66 IN | BODY MASS INDEX: 28.12 KG/M2 | SYSTOLIC BLOOD PRESSURE: 121 MMHG | HEART RATE: 84 BPM

## 2020-01-07 DIAGNOSIS — O26.90 PREGNANCY, ANTEPARTUM, COMPLICATIONS: ICD-10-CM

## 2020-01-07 DIAGNOSIS — Z34.00 SUPERVISION OF NORMAL FIRST PREGNANCY, ANTEPARTUM: Primary | ICD-10-CM

## 2020-01-07 PROCEDURE — 99207 ZZC FIRST OB VISIT: CPT | Performed by: OBSTETRICS & GYNECOLOGY

## 2020-01-07 ASSESSMENT — MIFFLIN-ST. JEOR: SCORE: 1545.54

## 2020-01-07 NOTE — TELEPHONE ENCOUNTER
Patient is scheduled for FTS with MF at [place].       Referring clinic notified.   Marissa De Souza,    , [place]

## 2020-01-07 NOTE — PROGRESS NOTES
"conceived first cycle of letrozole/HCG/prometrium. Discussed stopping Prometrium now. Dates by LMP c/w conception and 8 wk u/s. feeling tired but otherwise okay. Providers/residents, weight gain/exercise, delivery discussed. Not sure about first tri screen or quad, will check with insurance. VIt D level had been borderline low so now taking Ca with Vit D 600 BID.  We did annual only a few months back so not repeated today. RTC 4 weeks. BE    HPI:  Bhumika Gonsalez is a 29 year old female Patient's last menstrual period was 10/18/2019. at 11w4d, Estimated Date of Delivery: Jul 24, 2020.  She denies vaginal bleeding and abdominal pain. Super happy about pregnancy and conception with first cycle of letrozole.   No other c/o.    Past Medical History:   Diagnosis Date     Alopecia universalis      Asthma     cats and humidity        Past Surgical History:   Procedure Laterality Date     DENTAL SURGERY  2010    wisdom teeth       Allergies: Patient has no known allergies.     EXAM:  Blood pressure 121/76, pulse 84, height 1.676 m (5' 6\"), weight 79.4 kg (175 lb), last menstrual period 10/18/2019.   BMI= Body mass index is 28.25 kg/m .  General - pleasant female in no acute distress.  Abdomen - soft, nontender, nondistended, gravid, consistant with dates  Rectovaginal - deferred.  Musculoskeletal - no gross deformities.  Neurological - normal strength, sensation, and mental status.    Doptones were 172  BSUS confirms willis IUP with cardiac activity and fetal motion consistent with dates    ASSESSMENT/PLAN:  (Z34.00) Supervision of normal first pregnancy, antepartum  (primary encounter diagnosis)  Comment: concieved with letrozole/HCG/prometrium. dates by LMP c/w conception  Plan: US OB > 14 Weeks        Discussed testing options like first trimester screen and quad.  She will consider and let us know if she wants those ordered.    Weight gain and exercise during pregnancy was discussed at today's visit.  The patient will " return to clinic in 4 weeks for continued prenatal care.

## 2020-01-08 DIAGNOSIS — Z34.00 SUPERVISION OF NORMAL FIRST PREGNANCY, ANTEPARTUM: Primary | ICD-10-CM

## 2020-01-10 ENCOUNTER — AMBULATORY - HEALTHEAST (OUTPATIENT)
Dept: MATERNAL FETAL MEDICINE | Facility: HOSPITAL | Age: 30
End: 2020-01-10

## 2020-01-15 ENCOUNTER — RECORDS - HEALTHEAST (OUTPATIENT)
Dept: ULTRASOUND IMAGING | Facility: HOSPITAL | Age: 30
End: 2020-01-15

## 2020-01-15 ENCOUNTER — RECORDS - HEALTHEAST (OUTPATIENT)
Dept: ADMINISTRATIVE | Facility: OTHER | Age: 30
End: 2020-01-15

## 2020-01-15 ENCOUNTER — OFFICE VISIT - HEALTHEAST (OUTPATIENT)
Dept: MATERNAL FETAL MEDICINE | Facility: HOSPITAL | Age: 30
End: 2020-01-15

## 2020-01-15 DIAGNOSIS — O26.90 PREGNANCY, ANTEPARTUM, COMPLICATIONS: ICD-10-CM

## 2020-01-15 DIAGNOSIS — Z36.9 PRENATAL SCREENING ENCOUNTER: ICD-10-CM

## 2020-01-15 DIAGNOSIS — Z36.9 FIRST TRIMESTER SCREENING: ICD-10-CM

## 2020-01-15 DIAGNOSIS — O26.90 PREGNANCY RELATED CONDITIONS, UNSPECIFIED, UNSPECIFIED TRIMESTER: ICD-10-CM

## 2020-01-18 LAB — FIRST TRIMESTER SCREEN BIOCHEM MARKERS: NORMAL

## 2020-01-20 ENCOUNTER — COMMUNICATION - HEALTHEAST (OUTPATIENT)
Dept: MATERNAL FETAL MEDICINE | Facility: HOSPITAL | Age: 30
End: 2020-01-20

## 2020-02-04 ENCOUNTER — PRENATAL OFFICE VISIT (OUTPATIENT)
Dept: OBGYN | Facility: CLINIC | Age: 30
End: 2020-02-04
Payer: OTHER GOVERNMENT

## 2020-02-04 VITALS
HEIGHT: 66 IN | DIASTOLIC BLOOD PRESSURE: 88 MMHG | BODY MASS INDEX: 28.45 KG/M2 | WEIGHT: 177 LBS | SYSTOLIC BLOOD PRESSURE: 120 MMHG

## 2020-02-04 DIAGNOSIS — Z34.00 SUPERVISION OF NORMAL FIRST PREGNANCY, ANTEPARTUM: Primary | ICD-10-CM

## 2020-02-04 PROCEDURE — 99000 SPECIMEN HANDLING OFFICE-LAB: CPT | Performed by: OBSTETRICS & GYNECOLOGY

## 2020-02-04 PROCEDURE — 99207 ZZC PRENATAL VISIT: CPT | Performed by: OBSTETRICS & GYNECOLOGY

## 2020-02-04 PROCEDURE — 82105 ALPHA-FETOPROTEIN SERUM: CPT | Mod: 90 | Performed by: OBSTETRICS & GYNECOLOGY

## 2020-02-04 PROCEDURE — 36415 COLL VENOUS BLD VENIPUNCTURE: CPT | Performed by: OBSTETRICS & GYNECOLOGY

## 2020-02-04 ASSESSMENT — MIFFLIN-ST. JEOR: SCORE: 1554.62

## 2020-02-04 NOTE — PROGRESS NOTES
Feeling well. Had normal first trimester screen so check AFP today. Has u/s scheduled. Mom is going to watch baby after birth. RTC as scheduled. BE

## 2020-02-05 ENCOUNTER — TELEPHONE (OUTPATIENT)
Dept: FAMILY MEDICINE | Facility: CLINIC | Age: 30
End: 2020-02-05

## 2020-02-05 NOTE — TELEPHONE ENCOUNTER
This Pt's insurance is / [employer], which is for active  members. These members must request an insurance referral for any specialty Provider they see.    Pt changed her PCP from Dr. Jose Eduardo Wan at [address]  to Dr. Ann Mittal at [facility] [place].     An online [facility]  referral was submitted today for Pt to see Dr. Laxmi Elizabeth at [facility] [place].    [employer] Referral Rep

## 2020-02-07 LAB
# FETUSES US: NORMAL
# FETUSES: NORMAL
AFP ADJ MOM AMN: 1.1
AFP SERPL-MCNC: 30 NG/ML
AGE - REPORTED: 28.3 YR
CURRENT SMOKER: NO
CURRENT SMOKER: NO
DIABETES STATUS PATIENT: NO
FAMILY MEMBER DISEASES HX: NO
FAMILY MEMBER DISEASES HX: NO
GA METHOD: NORMAL
GA METHOD: NORMAL
GA: NORMAL WK
IDDM PATIENT QL: NO
INTEGRATED SCN PATIENT-IMP: NORMAL
LMP START DATE: NORMAL
MONOCHORIONIC TWINS: NO
SERVICE CMNT-IMP: NO
SPECIMEN DRAWN SERPL: NORMAL
VALPROIC/CARBAMAZEPINE STATUS: NO
WEIGHT UNITS: NORMAL

## 2020-02-28 ENCOUNTER — ANCILLARY PROCEDURE (OUTPATIENT)
Dept: ULTRASOUND IMAGING | Facility: CLINIC | Age: 30
End: 2020-02-28
Attending: OBSTETRICS & GYNECOLOGY
Payer: OTHER GOVERNMENT

## 2020-02-28 ENCOUNTER — PRENATAL OFFICE VISIT (OUTPATIENT)
Dept: OBGYN | Facility: CLINIC | Age: 30
End: 2020-02-28
Attending: OBSTETRICS & GYNECOLOGY
Payer: OTHER GOVERNMENT

## 2020-02-28 VITALS
OXYGEN SATURATION: 100 % | DIASTOLIC BLOOD PRESSURE: 74 MMHG | BODY MASS INDEX: 28.57 KG/M2 | SYSTOLIC BLOOD PRESSURE: 117 MMHG | HEART RATE: 104 BPM | WEIGHT: 177 LBS

## 2020-02-28 DIAGNOSIS — Z34.00 SUPERVISION OF NORMAL FIRST PREGNANCY, ANTEPARTUM: Primary | ICD-10-CM

## 2020-02-28 DIAGNOSIS — Z34.00 SUPERVISION OF NORMAL FIRST PREGNANCY, ANTEPARTUM: ICD-10-CM

## 2020-02-28 PROCEDURE — 99207 ZZC PRENATAL VISIT: CPT | Performed by: OBSTETRICS & GYNECOLOGY

## 2020-02-28 PROCEDURE — 76805 OB US >/= 14 WKS SNGL FETUS: CPT | Performed by: OBSTETRICS & GYNECOLOGY

## 2020-02-28 NOTE — PROGRESS NOTES
Had normal ultrasound today. Having a boy! Wt gain graph reviewed and doing great! No complaints! RTC 5 weeks and GCT then. BE

## 2020-03-16 ENCOUNTER — MYC REFILL (OUTPATIENT)
Dept: FAMILY MEDICINE | Facility: CLINIC | Age: 30
End: 2020-03-16

## 2020-03-16 DIAGNOSIS — Z00.00 ROUTINE GENERAL MEDICAL EXAMINATION AT A HEALTH CARE FACILITY: ICD-10-CM

## 2020-03-17 RX ORDER — ALBUTEROL SULFATE 90 UG/1
1-2 AEROSOL, METERED RESPIRATORY (INHALATION) EVERY 6 HOURS PRN
Qty: 8.5 G | Refills: 0 | Status: SHIPPED | OUTPATIENT
Start: 2020-03-17 | End: 2021-10-29

## 2020-03-17 NOTE — TELEPHONE ENCOUNTER
"Requested Prescriptions   Pending Prescriptions Disp Refills     albuterol (PROAIR HFA/PROVENTIL HFA/VENTOLIN HFA) 108 (90 Base) MCG/ACT inhaler 8.5 g 0     Last Written Prescription Date:  10/7/2019  Last Fill Quantity: 8.5g,  # refills: 0   Last office visit: 4/1/2019 with prescribing provider:  BRIANA  Future Office Visit:   Next 5 appointments (look out 90 days)    Apr 07, 2020  2:15 PM CDT  ESTABLISHED PRENATAL with Laxmi Elizabeth MD  [facility] ([facility]) [address] [place] [address]  [phone]                 Asthma Maintenance Inhalers - Anticholinergics Failed - 3/16/2020  7:43 AM        Failed - Asthma control assessment score within normal limits in last 6 months     Please review ACT score.           Failed - Recent (6 mo) or future (30 days) visit within the authorizing provider's specialty     Patient had office visit in the last 6 months or has a visit in the next 30 days with authorizing provider or within the authorizing provider's specialty.  See \"Patient Info\" tab in inbasket, or \"Choose Columns\" in Meds & Orders section of the refill encounter.            Passed - Patient is age 12 years or older        Passed - Medication is active on med list       Short-Acting Beta Agonist Inhalers Protocol  Failed - 3/16/2020  7:43 AM        Failed - Asthma control assessment score within normal limits in last 6 months     Please review ACT score.           Failed - Recent (6 mo) or future (30 days) visit within the authorizing provider's specialty     Patient had office visit in the last 6 months or has a visit in the next 30 days with authorizing provider or within the authorizing provider's specialty.  See \"Patient Info\" tab in inbasket, or \"Choose Columns\" in Meds & Orders section of the refill encounter.            Passed - Patient is age 12 or older        Passed - Medication is active on med list             "

## 2020-04-07 ENCOUNTER — PRENATAL OFFICE VISIT (OUTPATIENT)
Dept: OBGYN | Facility: CLINIC | Age: 30
End: 2020-04-07
Payer: OTHER GOVERNMENT

## 2020-04-07 VITALS
HEIGHT: 66 IN | SYSTOLIC BLOOD PRESSURE: 122 MMHG | BODY MASS INDEX: 29.39 KG/M2 | WEIGHT: 182.9 LBS | TEMPERATURE: 97.6 F | DIASTOLIC BLOOD PRESSURE: 77 MMHG | HEART RATE: 81 BPM

## 2020-04-07 DIAGNOSIS — Z34.00 SUPERVISION OF NORMAL FIRST PREGNANCY, ANTEPARTUM: ICD-10-CM

## 2020-04-07 LAB
GLUCOSE 1H P 50 G GLC PO SERPL-MCNC: 127 MG/DL (ref 60–129)
HGB BLD-MCNC: 12.7 G/DL (ref 11.7–15.7)

## 2020-04-07 PROCEDURE — 36415 COLL VENOUS BLD VENIPUNCTURE: CPT | Performed by: OBSTETRICS & GYNECOLOGY

## 2020-04-07 PROCEDURE — 82950 GLUCOSE TEST: CPT | Performed by: OBSTETRICS & GYNECOLOGY

## 2020-04-07 PROCEDURE — 99207 ZZC PRENATAL VISIT: CPT | Performed by: OBSTETRICS & GYNECOLOGY

## 2020-04-07 PROCEDURE — 86780 TREPONEMA PALLIDUM: CPT | Performed by: OBSTETRICS & GYNECOLOGY

## 2020-04-07 PROCEDURE — 00000218 ZZHCL STATISTIC OBHBG - HEMOGLOBIN: Performed by: OBSTETRICS & GYNECOLOGY

## 2020-04-07 ASSESSMENT — PATIENT HEALTH QUESTIONNAIRE - PHQ9: SUM OF ALL RESPONSES TO PHQ QUESTIONS 1-9: 2

## 2020-04-07 ASSESSMENT — MIFFLIN-ST. JEOR: SCORE: 1576.38

## 2020-04-07 NOTE — PROGRESS NOTES
Childbirth classes? Discussed online.  Plan on breastfeeding? Yes: will talk to insurance.  Birthcontrol? Condoms, wants to see when period comes back.  Sex on ultrasound? boy  Circumsion? No  Peds doc? [facility].    Good fetal movement.  Works [employer], working from home.  Recommended daily iron in addition to PNV to avoid anemia during COVID pandemic.

## 2020-04-08 LAB — T PALLIDUM AB SER QL: NONREACTIVE

## 2020-04-21 ENCOUNTER — HOSPITAL ENCOUNTER (OUTPATIENT)
Facility: CLINIC | Age: 30
End: 2020-04-21
Payer: OTHER GOVERNMENT

## 2020-05-05 ENCOUNTER — PRENATAL OFFICE VISIT (OUTPATIENT)
Dept: OBGYN | Facility: CLINIC | Age: 30
End: 2020-05-05
Payer: OTHER GOVERNMENT

## 2020-05-05 DIAGNOSIS — Z34.00 SUPERVISION OF NORMAL FIRST PREGNANCY, ANTEPARTUM: Primary | ICD-10-CM

## 2020-05-05 PROCEDURE — 99207 ZZC PRENATAL VISIT: CPT | Performed by: OBSTETRICS & GYNECOLOGY

## 2020-05-05 NOTE — PROGRESS NOTES
28w4d This visit was conducted via phone due to covid 19 pandemic.    Feeling ok, allergies are getting bad, but typical for her.  Taking claritin and albuterol prn.  Also hip pain.  Discussed some tricks, but may want support belt at next office visit.  Good fm.  Plan office visit in 2-3 weeks. Questions answered.  rosa    Total visit time: 6 min

## 2020-05-05 NOTE — PROGRESS NOTES
"Bhumika Gonsalez is a 30 year old female who is being evaluated via a billable telephone visit.      The patient has been notified of following:     \"This telephone visit will be conducted via a call between you and your physician/provider. We have found that certain health care needs can be provided without the need for a physical exam.  This service lets us provide the care you need with a short phone conversation.  If a prescription is necessary we can send it directly to your pharmacy.  If lab work is needed we can place an order for that and you can then stop by our lab to have the test done at a later time.    Telephone visits are billed at different rates depending on your insurance coverage. During this emergency period, for some insurers they may be billed the same as an in-person visit.  Please reach out to your insurance provider with any questions.    If during the course of the call the physician/provider feels a telephone visit is not appropriate, you will not be charged for this service.\"    Patient has given verbal consent for Telephone visit?  Yes            "

## 2020-05-19 ENCOUNTER — PRENATAL OFFICE VISIT (OUTPATIENT)
Dept: OBGYN | Facility: CLINIC | Age: 30
End: 2020-05-19
Payer: OTHER GOVERNMENT

## 2020-05-19 VITALS
BODY MASS INDEX: 30.34 KG/M2 | DIASTOLIC BLOOD PRESSURE: 79 MMHG | WEIGHT: 188 LBS | TEMPERATURE: 98.3 F | SYSTOLIC BLOOD PRESSURE: 134 MMHG | HEART RATE: 100 BPM

## 2020-05-19 DIAGNOSIS — Z34.00 SUPERVISION OF NORMAL FIRST PREGNANCY, ANTEPARTUM: Primary | ICD-10-CM

## 2020-05-19 DIAGNOSIS — Z23 NEED FOR TDAP VACCINATION: ICD-10-CM

## 2020-05-19 PROCEDURE — 90715 TDAP VACCINE 7 YRS/> IM: CPT | Performed by: OBSTETRICS & GYNECOLOGY

## 2020-05-19 PROCEDURE — 90471 IMMUNIZATION ADMIN: CPT | Performed by: OBSTETRICS & GYNECOLOGY

## 2020-05-19 PROCEDURE — 99207 ZZC PRENATAL VISIT: CPT | Performed by: OBSTETRICS & GYNECOLOGY

## 2020-05-19 NOTE — PROGRESS NOTES
[facility] OBGYN    CC: routine prenatal visit.    S:Bhumika Gonsalez is a 30 year old  at 30w4d by LMP c/w 8w6d us who presents today for routine prenatal visit.  Patient reports having some intermittent cramping, but none painful.  She also is experiencing allergy symptoms for which she is taking loratidine  Patient denies any vaginal bleeding, leakage of fluid.  She states she is feeling normal fetal movement.  She denies any headache, changes in vision, chest pain, chest pressure, shortness of breath, nausea, vomiting, diarrhea, or edema.      O:   Patient Vitals for the past 24 hrs:   BP Temp Temp src Pulse Weight   20 0934 134/79 98.3  F (36.8  C) Oral 100 85.3 kg (188 lb)   ]  Exam:  General- awake, alert, answering questions appropriately, appears comfortable  CV- regular rate  Lung- breathing comfortably on room air  Ext- bilateral lower extremities with no edema    Doptones- 148 bpm  Fundal height-29 cm    A&P: Bhumika Gonsalez is a 30 year old  at 30w4d by LMP c/w 8w6d us who presents today for routine prenatal visit.     (Z34.00) Supervision of normal first pregnancy, antepartum  (primary encounter diagnosis)  Comment: Doing well.  Reviewed COVID19 hospital and clinic visitor restrictions, mask policy, and routine COVID19 testing.  Reviewed s/sx of PTL, PPROM, fetal kick counts.    Plan: Pregnancy support belt fitted.  Next visit 2020    Patient due for Tdap vaccine:  Tdap vaccine given      Leydi Valencia MD

## 2020-05-19 NOTE — PATIENT INSTRUCTIONS
Patient Education     Adapting to Pregnancy: Third Trimester    Although common during pregnancy, some discomforts may seem worse in the final weeks. Simple lifestyle changes can help. Take care of yourself. And ask your partner to help out with small tasks.  Limiting leg problems  Ways to combat leg issues:    Wear support hose all day.    Avoid snug shoes and clothes that bind, like tight pants and socks with elastic tops.    Sit with your feet and legs raised often.  Caring for your breasts  Tips to follow include:    Wash with plain water. Avoid using harsh soaps or rubbing alcohol. They may cause dryness.    Wear a nursing bra for extra support. It can also hide any leaks from your nipples.  Controlling hemorrhoids  Ways to avoid hemorrhoids include:    Eat foods that are high in fiber. Also, exercise and drink enough fluids. This will reduce constipation and hemorrhoids.    Sleep and nap on your side. This limits pressure on the veins of your rectum.    Try not to stand or sit for long periods.  Controlling back pain  As your body changes during pregnancy, your back must work in new ways. Back pain is due to many causes. Physical changes in your body can strain your back and its supporting muscles. Also, hormones (chemicals that carry messages throughout the body) increase during pregnancy. This can affect how your muscles and joints work together. All of these changes can lead to pain. Pain may be felt in the upper or lower back. Pain is also common in the pelvis. Some pregnant women have sciatica. This is pain caused by pressure on the sciatic nerve running down the back of the leg. Ask your healthcare provider for specific tips and exercises to help control your back pain.  Tips to help you rest  Good rest and sleep will help you feel better. Here are some ideas:    Ask your partner to massage your shoulders, neck, or back.    Limit the errands you do each day.    Lie down in the afternoon or after work  for a few minutes.    Take a warm bath before you go to sleep.    Drink warm milk or teas without caffeine.    Avoid coffee, black tea, and cola.  Stopping heartburn    Avoid spicy, greasy, fried, or acidic foods.    Eat small amounts more often. Eat slowly.   Wait 2 hours after eating before lying down.    Sleep with your upper body raised 6 inches.   Managing mood swings  Ways to manage mood swings include:    Know that mood changes are normal.    Exercise often, but get plenty of rest.    Address any concerns and limit stress. Talking to your partner, other women, or your healthcare provider may help.  Dealing with urinary frequency  Tips to deal with having to urinate often include:    Drink plenty of water all day. If you drink a lot in the evening, though, you may have to get up more in the night.    Limit coffee, black tea, and cola.  Date Last Reviewed: 2/1/2018 2000-2019 [employer]. [address] [place] [address]. All rights reserved. This information is not intended as a substitute for professional medical care. Always follow your healthcare professional's instructions.           Patient Education     Comfort Tips During Pregnancy    Talk with your healthcare provider before using pain-relieving medicine at any time during your pregnancy.  First trimester tips  Nausea    Get up slowly. Eat a few unsalted crackers before you get out of bed.    Avoid smells that bother you.    Eat small bland low fat, light high-protein meals at frequent intervals.    Sip on water, weak tea, or clear soft drinks, like ginger ale. Eat ice chips.  Fatigue    Take catnaps when you can.    Get regular exercise.    Accept help from others.    Practice good sleep habits, like going to bed and getting up at the same time each day. Use your bed only for sleep and sex.  Mood swings    Talk about your feelings with others, including other mothers.    Limit sugar, chocolate, and caffeine.    Eat a healthy  diet. Don t skip meals.    Get regular exercise.  Headaches    Get fresh air and exercise.    Relax and get enough rest.    Check with your healthcare provider before taking any pain medicines.  Second trimester tips  Here are some suggestions to help you cope:    To limit ankle swelling, sit with your feet raised or wear support hose.    If you have pain in your groin and stomach (round ligament pain), avoid sudden twisting movements.    For leg cramps, flexing your foot often brings immediate relief. You also may try massaging your calf in long, downward strokes, or stretching your legs before going to bed. Get enough exercise and wear shoes with flexible soles.  Third trimester tips  Reducing heartburn    Eat small, light meals throughout the day rather than 3 large ones.    Sleep with your upper body raised 6 inches. Don t lie down until 2 hours after you eat.    Don't eat greasy, fried, or spicy foods.    Avoid citrus fruits and juices.  Treating constipation    Eat foods high in fiber (whole-grain foods, fresh fruit and vegetables).    Drink plenty of water.    Get regular exercise.    Discuss other medicines (like docusate and psyllium) with your healthcare provider.  Taking care of your breasts    Avoid using harsh soaps or alcohol, which can cause excessive dryness.    Wear nursing bras. They provide more support than regular bras and can be used after pregnancy if you breastfeed.  Getting a good night s sleep    Take a warm shower before bed.    Sleep on a firm mattress.    Lie on your side with 1 leg crossed over the other.    Use pillows to support arms, legs, and belly.  Date Last Reviewed: 10/1/2017    8718-4460 [employer]. [address] [place] [address], [place] [address]. All rights reserved. This information is not intended as a substitute for professional medical care. Always follow your healthcare professional's instructions.           Patient Education     Pregnancy: Your Third Trimester  Changes  As the baby grows, your body changes too. You may also see signs that your body is getting ready for labor. Be patient. Within a few more weeks, your baby will be born.  How you are changing  Your body is preparing for the birth of your baby. Some of the most common changes are listed below. If you have any questions or concerns, ask your healthcare provider:    You ll gain more weight from fluids, extra blood, and fat deposits.    Your breasts will grow as your body gets ready to feed the baby. They may be more tender. You may also notice a slight yellow or white discharge from the nipples.    Discharge from your vagina may increase. This is normal.    You might see some skin color changes on your forehead, cheeks, or nose. Most of these will go away after you deliver.  How your baby is growing       Month 7  Your baby can open and close his or her eyes and weighs around 4 pounds. If born prematurely (too early), your baby would likely survive with special care. Month 8  Your baby is building up body fat and weighs around 6 pounds. Month 9  Your baby weighs nearly 7 pounds and is about 19 to 21 inches long. In other words, any day now...   Date Last Reviewed: 2/1/2018 2000-2019 [employer]. [address] [place] [address], [place] [id number]. All rights reserved. This information is not intended as a substitute for professional medical care. Always follow your healthcare professional's instructions.           Patient Education     Kick Counts    It s normal to worry about your baby s health. One way you can know your baby s doing well is to record the baby s movements once a day. This is called a kick count. Remember to take your kick count records to all your appointments with your healthcare provider.  How to count kicks  Time how long it takes you to feel 10 kicks, flutters, swishes, or rolls. Ideally, you want to feel at least 10 movements within 2 hours. You will likely feel 10 movements in  less time than that.  Starting at 28 weeks, count your baby's movements daily. Follow your healthcare provider's instructions for kick counting. Here are tips for counting kicks:    Choose a time when the baby is active, such as after a meal.     Sit comfortably or lie on your side.     The first time the baby moves, write down the time.     Count each movement until the baby has moved 10 times. This can take from 20 minutes to 2 hours.     If you have not felt 10 kicks by the end of the second hour, wait a few hours. Then try again.    Try to do it at the same time each day.  When to call your healthcare provider  Call your healthcare provider right away if:    You do a couple sets of kick counts during the day and your baby moves fewer than 10 times in 2 hours    Your baby moves much less often than on the days before.    You have not felt your baby move all day.  Date Last Reviewed: 12/1/2017 2000-2019 [employer]. [address] [place] [address], [place] [address]. All rights reserved. This information is not intended as a substitute for professional medical care. Always follow your healthcare professional's instructions.

## 2020-06-02 ENCOUNTER — PRENATAL OFFICE VISIT (OUTPATIENT)
Dept: OBGYN | Facility: CLINIC | Age: 30
End: 2020-06-02
Payer: OTHER GOVERNMENT

## 2020-06-02 DIAGNOSIS — Z34.00 SUPERVISION OF NORMAL FIRST PREGNANCY, ANTEPARTUM: Primary | ICD-10-CM

## 2020-06-02 PROCEDURE — 99207 ZZC PRENATAL VISIT: CPT | Performed by: OBSTETRICS & GYNECOLOGY

## 2020-06-02 NOTE — PROGRESS NOTES
Telephone Encounter     Phone visit for modified prenatal care for COVID.    Start time:917  Stop time:921    [facility]- OBGYN    CC: phone prenatal visit.    S:  Patient reports she is doing very well.  Patient denies any contractions, vaginal bleeding, leakage of fluid.  She states she is feeling normal fetal movement.  She denies any headache, changes in vision, chest pain, chest pressure, shortness of breath, nausea, vomiting, diarrhea, or constipation.      A&P: Bhumika Gonsalez is a 30 year old  at 32w4d by LMP c/w 8w6d us who presents today for routine phone prenatal visit.    (Z34.00) Supervision of normal first pregnancy, antepartum  (primary encounter diagnosis)  Comment: Doing well. Discussed s/sx of PTL, PPROM.   Plan: Phone visit in 2 weeks and in person visit in 4 weeks w/ GBS, OB Hgb, and presentation check.    Leydi Valencia MD

## 2020-06-16 ENCOUNTER — PRENATAL OFFICE VISIT (OUTPATIENT)
Dept: OBGYN | Facility: CLINIC | Age: 30
End: 2020-06-16
Payer: OTHER GOVERNMENT

## 2020-06-16 DIAGNOSIS — Z34.00 SUPERVISION OF NORMAL FIRST PREGNANCY, ANTEPARTUM: Primary | ICD-10-CM

## 2020-06-16 PROCEDURE — 99207 ZZC PRENATAL VISIT: CPT | Performed by: OBSTETRICS & GYNECOLOGY

## 2020-06-16 NOTE — PROGRESS NOTES
Doing well.   Good fetal movement.   Not feeling any contractions.  Discussed where to go in labor, who to call.  RTC 2 weeks. GBS, hgb then.  Phone visit for modified prenatal care for COVID. Time 9 minutes.

## 2020-06-27 ENCOUNTER — NURSE TRIAGE (OUTPATIENT)
Dept: NURSING | Facility: CLINIC | Age: 30
End: 2020-06-27

## 2020-06-27 ENCOUNTER — HOSPITAL ENCOUNTER (INPATIENT)
Facility: CLINIC | Age: 30
LOS: 3 days | Discharge: HOME HEALTH | End: 2020-06-30
Attending: OBSTETRICS & GYNECOLOGY | Admitting: OBSTETRICS & GYNECOLOGY
Payer: OTHER GOVERNMENT

## 2020-06-27 PROBLEM — O42.919 PRETERM PREMATURE RUPTURE OF MEMBRANES (PPROM) WITH UNKNOWN ONSET OF LABOR: Status: ACTIVE | Noted: 2020-06-27

## 2020-06-27 PROBLEM — Z36.89 ENCOUNTER FOR TRIAGE IN PREGNANT PATIENT: Status: ACTIVE | Noted: 2020-06-27

## 2020-06-27 LAB
ABO + RH BLD: NORMAL
ABO + RH BLD: NORMAL
ALBUMIN UR-MCNC: 30 MG/DL
AMPHETAMINES UR QL SCN: NEGATIVE
APPEARANCE UR: CLEAR
BASOPHILS # BLD AUTO: 0 10E9/L (ref 0–0.2)
BASOPHILS NFR BLD AUTO: 0.2 %
BILIRUB UR QL STRIP: NEGATIVE
BLD GP AB SCN SERPL QL: NORMAL
BLOOD BANK CMNT PATIENT-IMP: NORMAL
CANNABINOIDS UR QL: NEGATIVE
COCAINE UR QL: NEGATIVE
COLOR UR AUTO: YELLOW
DIFFERENTIAL METHOD BLD: ABNORMAL
EOSINOPHIL # BLD AUTO: 0.7 10E9/L (ref 0–0.7)
EOSINOPHIL NFR BLD AUTO: 5.9 %
ERYTHROCYTE [DISTWIDTH] IN BLOOD BY AUTOMATED COUNT: 13.5 % (ref 10–15)
GLUCOSE UR STRIP-MCNC: NEGATIVE MG/DL
HCT VFR BLD AUTO: 37.3 % (ref 35–47)
HGB BLD-MCNC: 12.6 G/DL (ref 11.7–15.7)
HGB UR QL STRIP: ABNORMAL
IMM GRANULOCYTES # BLD: 0 10E9/L (ref 0–0.4)
IMM GRANULOCYTES NFR BLD: 0.3 %
KETONES UR STRIP-MCNC: 5 MG/DL
LEUKOCYTE ESTERASE UR QL STRIP: NEGATIVE
LYMPHOCYTES # BLD AUTO: 2.1 10E9/L (ref 0.8–5.3)
LYMPHOCYTES NFR BLD AUTO: 17.5 %
MCH RBC QN AUTO: 30.1 PG (ref 26.5–33)
MCHC RBC AUTO-ENTMCNC: 33.8 G/DL (ref 31.5–36.5)
MCV RBC AUTO: 89 FL (ref 78–100)
MONOCYTES # BLD AUTO: 1.1 10E9/L (ref 0–1.3)
MONOCYTES NFR BLD AUTO: 9 %
MUCOUS THREADS #/AREA URNS LPF: PRESENT /LPF
NEUTROPHILS # BLD AUTO: 8.1 10E9/L (ref 1.6–8.3)
NEUTROPHILS NFR BLD AUTO: 67.1 %
NITRATE UR QL: NEGATIVE
NRBC # BLD AUTO: 0 10*3/UL
NRBC BLD AUTO-RTO: 0 /100
OPIATES UR QL SCN: NEGATIVE
PCP UR QL SCN: NEGATIVE
PH UR STRIP: 7 PH (ref 5–7)
PLATELET # BLD AUTO: 291 10E9/L (ref 150–450)
RBC # BLD AUTO: 4.19 10E12/L (ref 3.8–5.2)
RBC #/AREA URNS AUTO: 52 /HPF (ref 0–2)
RUPTURE OF FETAL MEMBRANES BY ROM PLUS: POSITIVE
SARS-COV-2 RNA SPEC QL NAA+PROBE: NORMAL
SOURCE: ABNORMAL
SP GR UR STRIP: 1.03 (ref 1–1.03)
SPECIMEN EXP DATE BLD: NORMAL
SPECIMEN SOURCE: NORMAL
SPECIMEN SOURCE: NORMAL
SQUAMOUS #/AREA URNS AUTO: 3 /HPF (ref 0–1)
UROBILINOGEN UR STRIP-MCNC: 2 MG/DL (ref 0–2)
WBC # BLD AUTO: 12.1 10E9/L (ref 4–11)
WBC #/AREA URNS AUTO: 1 /HPF (ref 0–5)
WET PREP SPEC: NORMAL

## 2020-06-27 PROCEDURE — 84112 EVAL AMNIOTIC FLUID PROTEIN: CPT | Performed by: OBSTETRICS & GYNECOLOGY

## 2020-06-27 PROCEDURE — G0463 HOSPITAL OUTPT CLINIC VISIT: HCPCS | Mod: 25

## 2020-06-27 PROCEDURE — 81001 URINALYSIS AUTO W/SCOPE: CPT | Performed by: OBSTETRICS & GYNECOLOGY

## 2020-06-27 PROCEDURE — 80307 DRUG TEST PRSMV CHEM ANLYZR: CPT | Performed by: STUDENT IN AN ORGANIZED HEALTH CARE EDUCATION/TRAINING PROGRAM

## 2020-06-27 PROCEDURE — 86850 RBC ANTIBODY SCREEN: CPT | Performed by: STUDENT IN AN ORGANIZED HEALTH CARE EDUCATION/TRAINING PROGRAM

## 2020-06-27 PROCEDURE — 86780 TREPONEMA PALLIDUM: CPT | Performed by: STUDENT IN AN ORGANIZED HEALTH CARE EDUCATION/TRAINING PROGRAM

## 2020-06-27 PROCEDURE — 25000128 H RX IP 250 OP 636: Performed by: STUDENT IN AN ORGANIZED HEALTH CARE EDUCATION/TRAINING PROGRAM

## 2020-06-27 PROCEDURE — 85025 COMPLETE CBC W/AUTO DIFF WBC: CPT | Performed by: STUDENT IN AN ORGANIZED HEALTH CARE EDUCATION/TRAINING PROGRAM

## 2020-06-27 PROCEDURE — 87591 N.GONORRHOEAE DNA AMP PROB: CPT | Performed by: STUDENT IN AN ORGANIZED HEALTH CARE EDUCATION/TRAINING PROGRAM

## 2020-06-27 PROCEDURE — 86901 BLOOD TYPING SEROLOGIC RH(D): CPT | Performed by: STUDENT IN AN ORGANIZED HEALTH CARE EDUCATION/TRAINING PROGRAM

## 2020-06-27 PROCEDURE — 87210 SMEAR WET MOUNT SALINE/INK: CPT | Performed by: STUDENT IN AN ORGANIZED HEALTH CARE EDUCATION/TRAINING PROGRAM

## 2020-06-27 PROCEDURE — 87653 STREP B DNA AMP PROBE: CPT | Performed by: STUDENT IN AN ORGANIZED HEALTH CARE EDUCATION/TRAINING PROGRAM

## 2020-06-27 PROCEDURE — U0003 INFECTIOUS AGENT DETECTION BY NUCLEIC ACID (DNA OR RNA); SEVERE ACUTE RESPIRATORY SYNDROME CORONAVIRUS 2 (SARS-COV-2) (CORONAVIRUS DISEASE [COVID-19]), AMPLIFIED PROBE TECHNIQUE, MAKING USE OF HIGH THROUGHPUT TECHNOLOGIES AS DESCRIBED BY CMS-2020-01-R: HCPCS | Performed by: STUDENT IN AN ORGANIZED HEALTH CARE EDUCATION/TRAINING PROGRAM

## 2020-06-27 PROCEDURE — 12000001 ZZH R&B MED SURG/OB UMMC

## 2020-06-27 PROCEDURE — 86900 BLOOD TYPING SEROLOGIC ABO: CPT | Performed by: STUDENT IN AN ORGANIZED HEALTH CARE EDUCATION/TRAINING PROGRAM

## 2020-06-27 PROCEDURE — 87491 CHLMYD TRACH DNA AMP PROBE: CPT | Performed by: STUDENT IN AN ORGANIZED HEALTH CARE EDUCATION/TRAINING PROGRAM

## 2020-06-27 PROCEDURE — 87086 URINE CULTURE/COLONY COUNT: CPT | Performed by: STUDENT IN AN ORGANIZED HEALTH CARE EDUCATION/TRAINING PROGRAM

## 2020-06-27 PROCEDURE — 25800030 ZZH RX IP 258 OP 636: Performed by: STUDENT IN AN ORGANIZED HEALTH CARE EDUCATION/TRAINING PROGRAM

## 2020-06-27 PROCEDURE — C9803 HOPD COVID-19 SPEC COLLECT: HCPCS

## 2020-06-27 PROCEDURE — 25000125 ZZHC RX 250: Performed by: STUDENT IN AN ORGANIZED HEALTH CARE EDUCATION/TRAINING PROGRAM

## 2020-06-27 RX ORDER — LORATADINE 10 MG/1
10 TABLET ORAL DAILY PRN
COMMUNITY
End: 2021-10-29

## 2020-06-27 RX ORDER — PENICILLIN G POTASSIUM 5000000 [IU]/1
5 INJECTION, POWDER, FOR SOLUTION INTRAMUSCULAR; INTRAVENOUS ONCE
Status: COMPLETED | OUTPATIENT
Start: 2020-06-27 | End: 2020-06-28

## 2020-06-27 RX ORDER — LIDOCAINE 40 MG/G
CREAM TOPICAL
Status: DISCONTINUED | OUTPATIENT
Start: 2020-06-27 | End: 2020-06-29

## 2020-06-27 RX ORDER — OXYTOCIN 10 [USP'U]/ML
10 INJECTION, SOLUTION INTRAMUSCULAR; INTRAVENOUS
Status: DISCONTINUED | OUTPATIENT
Start: 2020-06-27 | End: 2020-06-29

## 2020-06-27 RX ORDER — SODIUM CHLORIDE, SODIUM LACTATE, POTASSIUM CHLORIDE, CALCIUM CHLORIDE 600; 310; 30; 20 MG/100ML; MG/100ML; MG/100ML; MG/100ML
INJECTION, SOLUTION INTRAVENOUS CONTINUOUS
Status: DISCONTINUED | OUTPATIENT
Start: 2020-06-27 | End: 2020-06-29

## 2020-06-27 RX ORDER — OXYCODONE AND ACETAMINOPHEN 5; 325 MG/1; MG/1
1 TABLET ORAL
Status: DISCONTINUED | OUTPATIENT
Start: 2020-06-27 | End: 2020-06-28

## 2020-06-27 RX ORDER — IBUPROFEN 800 MG/1
800 TABLET, FILM COATED ORAL
Status: COMPLETED | OUTPATIENT
Start: 2020-06-27 | End: 2020-06-28

## 2020-06-27 RX ORDER — OXYTOCIN/0.9 % SODIUM CHLORIDE 30/500 ML
1-24 PLASTIC BAG, INJECTION (ML) INTRAVENOUS CONTINUOUS
Status: DISCONTINUED | OUTPATIENT
Start: 2020-06-27 | End: 2020-06-29

## 2020-06-27 RX ORDER — CARBOPROST TROMETHAMINE 250 UG/ML
250 INJECTION, SOLUTION INTRAMUSCULAR
Status: DISCONTINUED | OUTPATIENT
Start: 2020-06-27 | End: 2020-06-28

## 2020-06-27 RX ORDER — NALOXONE HYDROCHLORIDE 0.4 MG/ML
.1-.4 INJECTION, SOLUTION INTRAMUSCULAR; INTRAVENOUS; SUBCUTANEOUS
Status: DISCONTINUED | OUTPATIENT
Start: 2020-06-27 | End: 2020-06-28

## 2020-06-27 RX ORDER — ONDANSETRON 2 MG/ML
4 INJECTION INTRAMUSCULAR; INTRAVENOUS EVERY 6 HOURS PRN
Status: DISCONTINUED | OUTPATIENT
Start: 2020-06-27 | End: 2020-06-29

## 2020-06-27 RX ORDER — OXYTOCIN/0.9 % SODIUM CHLORIDE 30/500 ML
100-340 PLASTIC BAG, INJECTION (ML) INTRAVENOUS CONTINUOUS PRN
Status: DISCONTINUED | OUTPATIENT
Start: 2020-06-27 | End: 2020-06-29

## 2020-06-27 RX ORDER — METHYLERGONOVINE MALEATE 0.2 MG/ML
200 INJECTION INTRAVENOUS
Status: DISCONTINUED | OUTPATIENT
Start: 2020-06-27 | End: 2020-06-28

## 2020-06-27 RX ORDER — BETAMETHASONE SODIUM PHOSPHATE AND BETAMETHASONE ACETATE 3; 3 MG/ML; MG/ML
12 INJECTION, SUSPENSION INTRA-ARTICULAR; INTRALESIONAL; INTRAMUSCULAR; SOFT TISSUE EVERY 24 HOURS
Status: DISCONTINUED | OUTPATIENT
Start: 2020-06-27 | End: 2020-06-29

## 2020-06-27 RX ORDER — ACETAMINOPHEN 325 MG/1
650 TABLET ORAL EVERY 4 HOURS PRN
Status: DISCONTINUED | OUTPATIENT
Start: 2020-06-27 | End: 2020-06-28

## 2020-06-27 RX ADMIN — PENICILLIN G POTASSIUM 5 MILLION UNITS: 5000000 POWDER, FOR SOLUTION INTRAMUSCULAR; INTRAPLEURAL; INTRATHECAL; INTRAVENOUS at 23:28

## 2020-06-27 RX ADMIN — SODIUM CHLORIDE, POTASSIUM CHLORIDE, SODIUM LACTATE AND CALCIUM CHLORIDE: 600; 310; 30; 20 INJECTION, SOLUTION INTRAVENOUS at 22:43

## 2020-06-27 RX ADMIN — Medication 1 MILLI-UNITS/MIN: at 22:48

## 2020-06-27 ASSESSMENT — MIFFLIN-ST. JEOR: SCORE: 1583.64

## 2020-06-28 ENCOUNTER — ANESTHESIA EVENT (OUTPATIENT)
Dept: OBGYN | Facility: CLINIC | Age: 30
End: 2020-06-28
Payer: OTHER GOVERNMENT

## 2020-06-28 ENCOUNTER — MEDICAL CORRESPONDENCE (OUTPATIENT)
Dept: HEALTH INFORMATION MANAGEMENT | Facility: CLINIC | Age: 30
End: 2020-06-28

## 2020-06-28 ENCOUNTER — ANESTHESIA (OUTPATIENT)
Dept: OBGYN | Facility: CLINIC | Age: 30
End: 2020-06-28
Payer: OTHER GOVERNMENT

## 2020-06-28 LAB
C TRACH DNA SPEC QL NAA+PROBE: NEGATIVE
GP B STREP DNA SPEC QL NAA+PROBE: NEGATIVE
N GONORRHOEA DNA SPEC QL NAA+PROBE: NEGATIVE
SARS-COV-2 PCR COMMENT: NORMAL
SARS-COV-2 RNA SPEC QL NAA+PROBE: NEGATIVE
SPECIMEN SOURCE: NORMAL
T PALLIDUM AB SER QL: NONREACTIVE

## 2020-06-28 PROCEDURE — 25000128 H RX IP 250 OP 636: Performed by: STUDENT IN AN ORGANIZED HEALTH CARE EDUCATION/TRAINING PROGRAM

## 2020-06-28 PROCEDURE — 25000132 ZZH RX MED GY IP 250 OP 250 PS 637: Performed by: STUDENT IN AN ORGANIZED HEALTH CARE EDUCATION/TRAINING PROGRAM

## 2020-06-28 PROCEDURE — 12000001 ZZH R&B MED SURG/OB UMMC

## 2020-06-28 PROCEDURE — 25800030 ZZH RX IP 258 OP 636: Performed by: STUDENT IN AN ORGANIZED HEALTH CARE EDUCATION/TRAINING PROGRAM

## 2020-06-28 PROCEDURE — 72200001 ZZH LABOR CARE VAGINAL DELIVERY SINGLE

## 2020-06-28 PROCEDURE — 25000128 H RX IP 250 OP 636: Performed by: ANESTHESIOLOGY

## 2020-06-28 PROCEDURE — 00HU33Z INSERTION OF INFUSION DEVICE INTO SPINAL CANAL, PERCUTANEOUS APPROACH: ICD-10-PCS | Performed by: OBSTETRICS & GYNECOLOGY

## 2020-06-28 PROCEDURE — 25000125 ZZHC RX 250: Performed by: ANESTHESIOLOGY

## 2020-06-28 PROCEDURE — 59400 OBSTETRICAL CARE: CPT | Performed by: OBSTETRICS & GYNECOLOGY

## 2020-06-28 PROCEDURE — 3E0R3BZ INTRODUCTION OF ANESTHETIC AGENT INTO SPINAL CANAL, PERCUTANEOUS APPROACH: ICD-10-PCS | Performed by: OBSTETRICS & GYNECOLOGY

## 2020-06-28 PROCEDURE — 0HQ9XZZ REPAIR PERINEUM SKIN, EXTERNAL APPROACH: ICD-10-PCS | Performed by: OBSTETRICS & GYNECOLOGY

## 2020-06-28 RX ORDER — METHYLERGONOVINE MALEATE 0.2 MG/ML
200 INJECTION INTRAVENOUS
Status: DISCONTINUED | OUTPATIENT
Start: 2020-06-28 | End: 2020-06-30 | Stop reason: HOSPADM

## 2020-06-28 RX ORDER — OXYTOCIN/0.9 % SODIUM CHLORIDE 30/500 ML
100 PLASTIC BAG, INJECTION (ML) INTRAVENOUS CONTINUOUS
Status: DISCONTINUED | OUTPATIENT
Start: 2020-06-28 | End: 2020-06-30 | Stop reason: HOSPADM

## 2020-06-28 RX ORDER — MODIFIED LANOLIN
OINTMENT (GRAM) TOPICAL
Status: DISCONTINUED | OUTPATIENT
Start: 2020-06-28 | End: 2020-06-30 | Stop reason: HOSPADM

## 2020-06-28 RX ORDER — AMOXICILLIN 250 MG
2 CAPSULE ORAL 2 TIMES DAILY
Status: DISCONTINUED | OUTPATIENT
Start: 2020-06-28 | End: 2020-06-30 | Stop reason: HOSPADM

## 2020-06-28 RX ORDER — MISOPROSTOL 200 UG/1
TABLET ORAL
Status: DISCONTINUED
Start: 2020-06-28 | End: 2020-06-28 | Stop reason: HOSPADM

## 2020-06-28 RX ORDER — HYDROCORTISONE 2.5 %
CREAM (GRAM) TOPICAL 3 TIMES DAILY PRN
Status: DISCONTINUED | OUTPATIENT
Start: 2020-06-28 | End: 2020-06-30 | Stop reason: HOSPADM

## 2020-06-28 RX ORDER — LIDOCAINE HYDROCHLORIDE 10 MG/ML
INJECTION, SOLUTION EPIDURAL; INFILTRATION; INTRACAUDAL; PERINEURAL
Status: DISCONTINUED
Start: 2020-06-28 | End: 2020-06-28 | Stop reason: HOSPADM

## 2020-06-28 RX ORDER — NALOXONE HYDROCHLORIDE 0.4 MG/ML
.1-.4 INJECTION, SOLUTION INTRAMUSCULAR; INTRAVENOUS; SUBCUTANEOUS
Status: DISCONTINUED | OUTPATIENT
Start: 2020-06-28 | End: 2020-06-28

## 2020-06-28 RX ORDER — EPHEDRINE SULFATE 50 MG/ML
5 INJECTION, SOLUTION INTRAMUSCULAR; INTRAVENOUS; SUBCUTANEOUS
Status: DISCONTINUED | OUTPATIENT
Start: 2020-06-28 | End: 2020-06-29

## 2020-06-28 RX ORDER — MISOPROSTOL 200 UG/1
800 TABLET ORAL
Status: DISCONTINUED | OUTPATIENT
Start: 2020-06-28 | End: 2020-06-30 | Stop reason: HOSPADM

## 2020-06-28 RX ORDER — FENTANYL/BUPIVACAINE/NS/PF 2-1250MCG
PLASTIC BAG, INJECTION (ML) INJECTION
Status: COMPLETED
Start: 2020-06-28 | End: 2020-06-28

## 2020-06-28 RX ORDER — OXYTOCIN/0.9 % SODIUM CHLORIDE 30/500 ML
340 PLASTIC BAG, INJECTION (ML) INTRAVENOUS CONTINUOUS PRN
Status: DISCONTINUED | OUTPATIENT
Start: 2020-06-28 | End: 2020-06-30 | Stop reason: HOSPADM

## 2020-06-28 RX ORDER — LIDOCAINE HYDROCHLORIDE AND EPINEPHRINE 15; 5 MG/ML; UG/ML
INJECTION, SOLUTION EPIDURAL PRN
Status: DISCONTINUED | OUTPATIENT
Start: 2020-06-28 | End: 2020-07-05 | Stop reason: HOSPADM

## 2020-06-28 RX ORDER — IBUPROFEN 800 MG/1
800 TABLET, FILM COATED ORAL EVERY 6 HOURS PRN
Status: DISCONTINUED | OUTPATIENT
Start: 2020-06-28 | End: 2020-06-30 | Stop reason: HOSPADM

## 2020-06-28 RX ORDER — AMOXICILLIN 250 MG
1 CAPSULE ORAL 2 TIMES DAILY
Status: DISCONTINUED | OUTPATIENT
Start: 2020-06-28 | End: 2020-06-30 | Stop reason: HOSPADM

## 2020-06-28 RX ORDER — CARBOPROST TROMETHAMINE 250 UG/ML
250 INJECTION, SOLUTION INTRAMUSCULAR
Status: DISCONTINUED | OUTPATIENT
Start: 2020-06-28 | End: 2020-06-30 | Stop reason: HOSPADM

## 2020-06-28 RX ORDER — FENTANYL/BUPIVACAINE/NS/PF 2-1250MCG
PLASTIC BAG, INJECTION (ML) INJECTION CONTINUOUS PRN
Status: DISCONTINUED | OUTPATIENT
Start: 2020-06-28 | End: 2020-07-05 | Stop reason: HOSPADM

## 2020-06-28 RX ORDER — NALOXONE HYDROCHLORIDE 0.4 MG/ML
.1-.4 INJECTION, SOLUTION INTRAMUSCULAR; INTRAVENOUS; SUBCUTANEOUS
Status: DISCONTINUED | OUTPATIENT
Start: 2020-06-28 | End: 2020-06-30 | Stop reason: HOSPADM

## 2020-06-28 RX ORDER — OXYTOCIN/0.9 % SODIUM CHLORIDE 30/500 ML
PLASTIC BAG, INJECTION (ML) INTRAVENOUS
Status: DISCONTINUED
Start: 2020-06-28 | End: 2020-06-28 | Stop reason: HOSPADM

## 2020-06-28 RX ORDER — OXYTOCIN 10 [USP'U]/ML
10 INJECTION, SOLUTION INTRAMUSCULAR; INTRAVENOUS
Status: DISCONTINUED | OUTPATIENT
Start: 2020-06-28 | End: 2020-06-30 | Stop reason: HOSPADM

## 2020-06-28 RX ORDER — OXYTOCIN 10 [USP'U]/ML
INJECTION, SOLUTION INTRAMUSCULAR; INTRAVENOUS
Status: DISCONTINUED
Start: 2020-06-28 | End: 2020-06-28 | Stop reason: HOSPADM

## 2020-06-28 RX ORDER — NALBUPHINE HYDROCHLORIDE 20 MG/ML
2.5-5 INJECTION, SOLUTION INTRAMUSCULAR; INTRAVENOUS; SUBCUTANEOUS EVERY 6 HOURS PRN
Status: DISCONTINUED | OUTPATIENT
Start: 2020-06-28 | End: 2020-06-29

## 2020-06-28 RX ORDER — EPHEDRINE SULFATE 50 MG/ML
INJECTION, SOLUTION INTRAMUSCULAR; INTRAVENOUS; SUBCUTANEOUS
Status: DISCONTINUED
Start: 2020-06-28 | End: 2020-06-28 | Stop reason: HOSPADM

## 2020-06-28 RX ORDER — BISACODYL 10 MG
10 SUPPOSITORY, RECTAL RECTAL DAILY PRN
Status: DISCONTINUED | OUTPATIENT
Start: 2020-06-30 | End: 2020-06-30 | Stop reason: HOSPADM

## 2020-06-28 RX ORDER — BUPIVACAINE HYDROCHLORIDE 2.5 MG/ML
INJECTION, SOLUTION EPIDURAL; INFILTRATION; INTRACAUDAL PRN
Status: DISCONTINUED | OUTPATIENT
Start: 2020-06-28 | End: 2020-07-05 | Stop reason: HOSPADM

## 2020-06-28 RX ORDER — ACETAMINOPHEN 325 MG/1
650 TABLET ORAL EVERY 4 HOURS PRN
Status: DISCONTINUED | OUTPATIENT
Start: 2020-06-28 | End: 2020-06-30 | Stop reason: HOSPADM

## 2020-06-28 RX ADMIN — SODIUM CHLORIDE, POTASSIUM CHLORIDE, SODIUM LACTATE AND CALCIUM CHLORIDE 1000 ML: 600; 310; 30; 20 INJECTION, SOLUTION INTRAVENOUS at 12:25

## 2020-06-28 RX ADMIN — ACETAMINOPHEN 650 MG: 325 TABLET, FILM COATED ORAL at 23:05

## 2020-06-28 RX ADMIN — Medication 2.5 MILLION UNITS: at 05:46

## 2020-06-28 RX ADMIN — BUPIVACAINE HYDROCHLORIDE 8 ML: 2.5 INJECTION, SOLUTION EPIDURAL; INFILTRATION; INTRACAUDAL at 12:55

## 2020-06-28 RX ADMIN — Medication 2.5 MILLION UNITS: at 14:12

## 2020-06-28 RX ADMIN — Medication 2.5 MILLION UNITS: at 09:50

## 2020-06-28 RX ADMIN — SODIUM CHLORIDE, POTASSIUM CHLORIDE, SODIUM LACTATE AND CALCIUM CHLORIDE: 600; 310; 30; 20 INJECTION, SOLUTION INTRAVENOUS at 10:48

## 2020-06-28 RX ADMIN — Medication 10 ML/HR: at 12:57

## 2020-06-28 RX ADMIN — LIDOCAINE HYDROCHLORIDE,EPINEPHRINE BITARTRATE 3 ML: 15; .005 INJECTION, SOLUTION EPIDURAL; INFILTRATION; INTRACAUDAL; PERINEURAL at 12:51

## 2020-06-28 RX ADMIN — Medication 2.5 MILLION UNITS: at 01:45

## 2020-06-28 RX ADMIN — IBUPROFEN 800 MG: 800 TABLET, FILM COATED ORAL at 18:48

## 2020-06-28 RX ADMIN — BETAMETHASONE SODIUM PHOSPHATE AND BETAMETHASONE ACETATE 12 MG: 3; 3 INJECTION, SUSPENSION INTRA-ARTICULAR; INTRALESIONAL; INTRAMUSCULAR at 00:58

## 2020-06-28 NOTE — PLAN OF CARE
Data: [facility]   Reason for maternal/fetal assessment per patient is Rule out rupture of membranes  .  Patient is a . Prenatal record reviewed.      OB History    Para Term  AB Living   1 0 0 0 0 0   SAB TAB Ectopic Multiple Live Births   0 0 0 0 0      # Outcome Date GA Lbr Stephen/2nd Weight Sex Delivery Anes PTL Lv   1 Current            . Medical history:   Past Medical History:   Diagnosis Date     Alopecia universalis      Asthma     cats and humidity    . Gestational Age 36w1d. VSS. Fetal movement present. Patient denies cramping, backache, pelvic pressure, UTI symptoms, GI problems, edema, headache, visual disturbances, epigastric or URQ pain, abdominal pain.  Support persons Rashad present.  Action: Verbal consent for EFM. Triage assessment completed. EFM applied for continuous monitoring. Uterine assessment see flowsheet. Fetal assessment: Presumed adequate fetal oxygenation documented (see flow record).   Response: Dr. Ventura informed of patient arrival. Plan per provider is ROM test. Patient verbalized agreement with plan. Patient transferred to room 458 ambulatory, oriented to room and call light.

## 2020-06-28 NOTE — PROVIDER NOTIFICATION
06/28/20 0810   Provider Notification   Provider Name/Title Messelt   Method of Notification Phone   Request Evaluate-Remote   Notification Reason Status Update   Provider requests increase in pitocin, will increase when appropriate. Will proceed with ongoing assessment.

## 2020-06-28 NOTE — PROVIDER NOTIFICATION
06/28/20 1240   Provider Notification   Provider Name/Title ADRIA Pacheco   Method of Notification At Bedside   Request Evaluate in Person   Notification Reason Patient Request   Provider present for epidural administration. Will proceed with ongoing assessment.

## 2020-06-28 NOTE — PROVIDER NOTIFICATION
06/28/20 0930   Provider Notification   Provider Name/Title Hilda   Method of Notification At Bedside   Request Evaluate in Person   Notification Reason Status Update   Provider present to discuss plan of care. Plan per provider to re-evaluate in a couple of hours. Will proceed with ongoing assessment.

## 2020-06-28 NOTE — PROVIDER NOTIFICATION
06/28/20 1225   Provider Notification   Provider Name/Title Tyshawnelt   Method of Notification In Department   Request Evaluate in Person   Notification Reason Status Update   PRovider notified of pt request for epidural. Will proceed with epidural care prep.

## 2020-06-28 NOTE — PROVIDER NOTIFICATION
06/28/20 1236   Provider Notification   Provider Name/Title John   Method of Notification At Bedside   Request Evaluate in Person   Notification Reason Status Update   Provider at bedside to evaluate pt. Plan for pt to receive epidural and perform a SVE followingshannon pt is comfortable. Will proceed with ongoing assessment.

## 2020-06-28 NOTE — TELEPHONE ENCOUNTER
Bhumika is 36w1d pregnant    She reports that she has had a large amount of watery vaginal discharge, especially when she changes positions.    Per protocol, ER/L&D advised    7:57 pm spoke to charge nurse Emi RN at [facility]; They are currently accepting patients; Notified of patient advised to present to L&D; Given pt Name, , MRN, ROBLES &     Pt notified to proceed to L&D; [place] in [place] and come up to L&D through the [facility]; One support person allowed    COVID 19 Nurse Triage Plan/Patient Instructions    Please be aware that novel coronavirus (COVID-19) may be circulating in the community. If you develop symptoms such as fever, cough, or SOB or if you have concerns about the presence of another infection including coronavirus (COVID-19), please contact your health care provider or visit [email or web address].     Disposition/Instructions    Patient to go to ED and follow protocol based instructions.     Bring Your Own Device:  Please also bring your smart device(s) (smart phones, tablets, laptops) and their charging cables for your personal use and to communicate with your care team during your visit.    Thank you for taking steps to prevent the spread of this virus.  o Limit your contact with others.  o Wear a simple mask to cover your cough.  o Wash your hands well and often.    Resources    [facility]: About COVID-19: [email or web address]    CDC: What to Do If You're Sick: www.cdc.gov/coronavirus/2019-ncov/about/steps-when-sick.html    CDC: Ending Home Isolation: www.cdc.gov/coronavirus/2019-ncov/hcp/disposition-in-home-patients.html     CDC: Caring for Someone: www.cdc.gov/coronavirus/2019-ncov/if-you-are-sick/care-for-someone.html     [facility]: Interim Guidance for Hospital Discharge to Home: www.health.[facility].[place].us/diseases/coronavirus/hcp/hospdischarge.pdf    [facility] clinical trials (COVID-19 research studies): clinicalaffairs.[facility]/umn-clinical-trials  "    Below are the COVID-19 hotlines at the [place] [facility] ([facility]). Interpreters are available.   o For health questions: Call 822-648-9927 or 1-703.734.2583 (7 a.m. to 7 p.m.)  o For questions about schools and childcare: Call 306-369-9295 or 1-596.259.9164 (7 a.m. to 7 p.m.)     Karyn Christian RN  [facility]      Reason for Disposition    Leakage of fluid from vagina    Additional Information    Negative: [1] SEVERE abdominal pain (e.g., excruciating) AND [2] constant AND [3] present > 1 hour    Negative: Severe bleeding (e.g., continuous red blood from vagina, or large blood clots)    Negative: Umbilical cord hanging out of the vagina (shiny, white, curled appearance, \"like telephone cord\")    Negative: Uncontrollable urge to push (i.e., feels like baby is coming out now)    Negative: Can see baby    Negative: Sounds like a life-threatening emergency to the triager    Negative: Vaginal bleeding    Protocols used: PREGNANCY - RUPTURE OF [id number]      "

## 2020-06-28 NOTE — PROGRESS NOTES
[facility] PROGRESS NOTE:   2020            SUBJECTIVE:   Patient c/o cramping.    Contractions:  q 2-4 minutes  Leakage of fluid:  Yes: clear, pink tinged  Vaginal bleeding:  No  Pain controlled:  Yes           OBJECTIVE:     Vitals:    20 0600 20 0730 20 0830 20 0929   BP:  130/75  129/78   Resp:  17  17   Temp: 98.2  F (36.8  C) 97.8  F (36.6  C) 98  F (36.7  C) 98  F (36.7  C)   TempSrc:  Oral  Oral   Weight:       Height:             NST:  Fetal Heart Rate Tracing:   Baseline: 135  Variability: Moderate  Accels, no decels    Tocometer: q 2-4 minutes, pitocin 8 mu/min    Abdomen:  Gravid, NT  Cervix:   deferred           LABS:   No results found for this or any previous visit (from the past 12 hour(s)).           ASSESSMENT:   30 year old  at 36w2d   induction of labor, indication premature rupture of membranes.           PLAN:   Labor induction with Pitocin  Prophylactic antibiotic for unknown GBS status  Anticipate     Adelaida Stevens MD

## 2020-06-28 NOTE — DISCHARGE SUMMARY
[facility] Discharge Summary    Bhumika Gonsalez MRN# [id number]   Age: 30 year old YOB: 1990     Date of Admission:  2020  Date of Discharge::    Admitting Physician:  Nora Restrepo MD  Discharge Physician:  Aurelia Huston MD          Admission Diagnoses:   -IUP at 36w2d  -PPROM at 36w1d          Discharge Diagnosis:   -IUP at 36w2d, now delivered          Procedures:   Procedure(s): -            Medications Prior to Admission:     No medications prior to admission.             Discharge Medications:        Review of your medicines      START taking      Dose / Directions   acetaminophen 325 MG tablet  Commonly known as:  TYLENOL  Used for:   (normal spontaneous vaginal delivery)      Dose:  650 mg  Take 2 tablets (650 mg) by mouth every 6 hours as needed for mild pain Start after Delivery.  Quantity:  100 tablet  Refills:  0     ibuprofen 600 MG tablet  Commonly known as:  ADVIL/MOTRIN  Used for:   (normal spontaneous vaginal delivery)      Dose:  600 mg  Take 1 tablet (600 mg) by mouth every 6 hours as needed for moderate pain Start after delivery  Quantity:  60 tablet  Refills:  0     senna-docusate 8.6-50 MG tablet  Commonly known as:  SENOKOT-S/PERICOLACE  Used for:   (normal spontaneous vaginal delivery)      Dose:  1 tablet  Take 1 tablet by mouth daily Start after delivery.  Quantity:  100 tablet  Refills:  0        CONTINUE these medicines which have NOT CHANGED      Dose / Directions   albuterol 108 (90 Base) MCG/ACT inhaler  Commonly known as:  PROAIR HFA/PROVENTIL HFA/VENTOLIN HFA  Used for:  Routine general medical examination at a health care facility      Dose:  1-2 puff  Inhale 1-2 puffs into the lungs every 6 hours as needed for shortness of breath / dyspnea or wheezing  Quantity:  8.5 g  Refills:  0     loratadine 10 MG tablet  Commonly known as:  CLARITIN      Dose:  10 mg  Take 10 mg by mouth daily as needed for allergies  Refills:  0      order for DME  Used for:  Supervision of normal first pregnancy, antepartum      Pregnancy support belt  Quantity:  1 Device  Refills:  0     PRENATAL+DHA PO      Refills:  0           Where to get your medicines      These medications were sent to [facility] [place], [place] - [address]  [address] [facility] [address], [facility] [address]    Phone:  [phone]     acetaminophen 325 MG tablet    ibuprofen 600 MG tablet    senna-docusate 8.6-50 MG tablet             Consultations:   None          Brief Admission History     Bhumika Gonsalez is a 30 year old  at 36w1d by LMP c/w 8w6d US who presents with complaints of leaking of fluid.     She states that she is feeling well today, around 1700 felt gush of clear fluid and feels a continuous trickle. No contractions, no bleeding, normal fetal movement. Denies dysuria. No vaginal irritation or itching. She denies headache, vision changes, chest pain, shortness of breath, fever, chills, nausea, vomiting or other systemic          Brief Intrapartum Course:   She received one dose of betamethasone. She was started on penicillin for GBS prophylaxis.      Labor was augmented with pitocin. For pain, she received epidural. She progressed with complete and with good effort delivered a male infant from ANABEL position. Shoulders delivered easily. No nuchal cord. Placed on mother's chest. Infant was vigorous, so cord was cut after 60 second delay. APGAR of 8 and 9 at 1 and 5 minutes. Weight 2630g. Placenta delivered with gentle cord traction; noted to be intact with 3 vessel cord. First degree lacteration was repaired in standard fashion with 3-0 Vicryl. Good hemostasis noted. Fundus firm and lochia minimal at the end of the case. QBL 45 ml. Dr. Stevens was present during entire delivery and repair.          Hospital Course:   The patient's hospital course was unremarkable.  On discharge, her pain was well controlled. Vaginal bleeding is similar to peak  menstrual flow.  Voiding without difficulty.  Ambulating well and tolerating a normal diet.  No fever. Plans breastfeeding.  Infant is stable in NICU. She was discharged on post-partum day #2.    Post-partum hemoglobin: not drawn due to minimal blood loss with delivery, starting Hgb 12.6          Discharge Instructions and Follow-Up:   Discharge diet: Regular   Discharge activity: Pelvic rest for 6 weeks including no sexual intercourse, tampons, or douching.    Discharge follow-up: Follow up with your primary OB for a routine postpartum visit in 6 weeks           Discharge Disposition:   Discharged to home in stable condition       Jeannie Dunaway MD  OB/GYN Resident, PGY-3  6/30/2020 6:27 AM          Physician Attestation   I, Aurelia Huston, have seen and examined this patient.  I have reviewed the above note and agree with discharge plan as documented in the above note.     Aurelia Huston MD  Date of Service (when I saw the patient): 06/30/20

## 2020-06-28 NOTE — PROVIDER NOTIFICATION
06/28/20 0800   Provider Notification   Provider Name/Title [place]   Method of Notification Phone   Request Evaluate - Remote   Notification Reason Status Update   Provider notified of FHR cat 1, uterine activity q2 mins, 60-80 sec in length. Plan per provider to increase pitocin. Will proceed with plan when appropriate. Will continue with ongoing assessment.

## 2020-06-28 NOTE — PROGRESS NOTES
Labor Progress Note    S: Feeling more uncomfortable, would like epidural.     O:   Patient Vitals for the past 4 hrs:   BP Temp Temp src Resp   20 1147 -- 97.9  F (36.6  C) Oral --   20 1045 133/80 97.9  F (36.6  C) Oral 18   20 0929 129/78 98  F (36.7  C) Oral 17   Gen: uncomfortable     FHT: Baseline 135, moderate variability, + accelerations, no decelerations  [place]: contractions every 2-3 minutes    Pit at 11    A/P: 30 year old  at 36w2d by LMP c/w 8w6d US, here with PPROM during uncomplicated pregnancy.    # PPROM  # IOL   - PPROM at 36w1d for IOL, pooling, ROM+ positive, subjectively low fluid on bedside US.   - wet prep, UA, UDS negative, Ucx and GC/CT pending  - Clinda for GBS unknown, collected and pending  - s/p BMZ x1, will give second dose if still pregnant  - Cvx: C/L/H on admission, on pitocin and titrating as able. Will reassess cervix as clinically indicated, possibly after epidural when she is comfortable   - Pain: Desires epidural  - Anticipate      #  Fetal well-being  - Category I FHT  - GBS unknown, clinda until delivery or GBS results   - EFW 6#    Diana Lopez MD  OB/GYN PGY-2  20 12:31 PM

## 2020-06-28 NOTE — PLAN OF CARE
Patient presented to labor and delivery for rule out rupture of membranes.  ROM+ positive result.  Clear fluid noted, Cephalic presentation by BSUS.  Patient transferred to [facility] and induction of labor started.  IV started, labs drawn. Oxytocin and penicillin started. Patient resting comfortable, denies needs at this time. FHT category I tracing.  Will continue to monitor.

## 2020-06-28 NOTE — PROGRESS NOTES
[facility] PROGRESS NOTE:   2020   4:22 PM         SUBJECTIVE:   Patient c/o nothing.    Contractions:  q 3 minutes  Leakage of fluid:  Yes: clear  Vaginal bleeding:  No  Pain controlled:  Yes, epidural           OBJECTIVE:     Vitals:    20 1404 20 1435 20 1504 20 1530   BP: 106/55 106/59 109/65    Resp: 17 18  16   Temp:   97.9  F (36.6  C) 98.3  F (36.8  C)   TempSrc:   Oral Oral   SpO2:    96%   Weight:       Height:             NST:  Fetal Heart Rate Tracing:   Baseline: 140  Variability: Moderate  Accels, no decels    Tocometer: q 3 minutes, pitocin 12 mu/min    Abdomen:  Gravid, NT  Cervix:   Dilation: 3   Effacement: 100%   Station:-2   Consistency: soft   Position: Mid           LABS:   No results found for this or any previous visit (from the past 12 hour(s)).           ASSESSMENT:   30 year old  at 36w2d   induction of labor, indication premature rupture of membranes.           PLAN:   Labor induction with Pitocin  Anticipate     Adelaida Stevens MD

## 2020-06-28 NOTE — H&P
L&D History and Physical   2020  Bhumika Gonsalez  [id number]      HPI: Bhumika Gonsalez is a 30 year old  at 36w1d by LMP c/w 8w6d US who presents with complaints of leaking of fluid.    She states that she is feeling well today, around 1700 felt gush of clear fluid and feels a continuous trickle. No contractions, no bleeding, normal fetal movement. Denies dysuria. No vaginal irritation or itching. She denies headache, vision changes, chest pain, shortness of breath, fever, chills, nausea, vomiting or other systemic complaints.      Her pregnancy has been complicated by:  - Uncomplicated    OBHX:   OB History    Para Term  AB Living   1 0 0 0 0 0   SAB TAB Ectopic Multiple Live Births   0 0 0 0 0      # Outcome Date GA Lbr Stephen/2nd Weight Sex Delivery Anes PTL Lv   1 Current                PMHX:  Past Medical History:   Diagnosis Date     Alopecia universalis      Asthma     cats and humidity        PSHX:  Past Surgical History:   Procedure Laterality Date     DENTAL SURGERY      wisdom teeth       Medications:   No current facility-administered medications on file prior to encounter.   loratadine (CLARITIN) 10 MG tablet, Take 10 mg by mouth daily as needed for allergies  Prenatal MV-Min-Fe Fum-FA-DHA (PRENATAL+DHA PO),   albuterol (PROAIR HFA/PROVENTIL HFA/VENTOLIN HFA) 108 (90 Base) MCG/ACT inhaler, Inhale 1-2 puffs into the lungs every 6 hours as needed for shortness of breath / dyspnea or wheezing  order for DME, Pregnancy support belt        Allergies   Allergen Reactions     Cats        Family History   Problem Relation Age of Onset     Hypertension Father      Hyperlipidemia Father        SocialHX:   Social History     Tobacco Use     Smoking status: Never Smoker     Smokeless tobacco: Never Used   Substance Use Topics     Alcohol use: Not Currently     Frequency: 2-3 times a week     Drinks per session: 1 or 2     Binge frequency: Never     Drug use: No       ROS: 10-point ROS  "negative except as indicated in HPI.    Physical Exam:  Vitals:    20   BP: 124/80    Resp: 16    Temp: 98.6  F (37  C)    TempSrc: Oral    Weight:  85.3 kg (188 lb)   Height:  1.651 m (5' 5\")     General: alert, oriented female, resting in bed in NAD  CV: regular rate and rhythm  Lungs: clear bilaterally, no crackles or wheezes.   Abdomen: soft, gravid, non-tender, EFW 6#.  Extremities: bilateral lower extremities non-tender with no edema    SVE: C/L/H  Membranes: ruptured, ROM+ positive and pooling of clear fluid in vagina    Presentation: cephalic by BSUS, MVP 2.2    FHT: baseline 155, moderate variability, +accelerations, no decelerations  [place]: No contractions    Prenatal ultrasounds:  Datinw6d, ROBLES 20, c/w LMP  Anatomy: 20, 19w0d, EFW 255g, anatomy wnl, 3VC, placenta posterior, MVP 3.6    Prenatal Labs:   Lab Results   Component Value Date    ABO O 2019    RH Pos 2019    AS Neg 2019    HEPBANG Nonreactive 2019    CHPCRT Negative 2018    GCPCRT Negative 2018    HGB 12.7 2020       GBS Status:   Collected and pending    Lab Results   Component Value Date    PAP NIL 2018       Labs:   Results for orders placed or performed during the hospital encounter of 20 (from the past 24 hour(s))   Rupture of Fetal Membranes by ROM Plus   Result Value Ref Range    Rupture of Fetal Membranes by ROM Plus Positive (A) NEG^Negative   Wet prep    Specimen: Vagina   Result Value Ref Range    Specimen Description Vagina     Wet Prep No motile Trichomonas seen     Wet Prep No clue cells seen     Wet Prep No yeast seen     Wet Prep Rare  PMNs seen      UA with Microscopic reflex to Culture    Specimen: Urine clean catch; Midstream Urine   Result Value Ref Range    Color Urine Yellow     Appearance Urine Clear     Glucose Urine Negative NEG^Negative mg/dL    Bilirubin Urine Negative NEG^Negative    Ketones Urine 5 (A) NEG^Negative mg/dL    " Specific Gravity Urine 1.028 1.003 - 1.035    Blood Urine Small (A) NEG^Negative    pH Urine 7.0 5.0 - 7.0 pH    Protein Albumin Urine 30 (A) NEG^Negative mg/dL    Urobilinogen mg/dL 2.0 0.0 - 2.0 mg/dL    Nitrite Urine Negative NEG^Negative    Leukocyte Esterase Urine Negative NEG^Negative    Source Midstream Urine     WBC Urine 1 0 - 5 /HPF    RBC Urine 52 (H) 0 - 2 /HPF    Squamous Epithelial /HPF Urine 3 (H) 0 - 1 /HPF    Mucous Urine Present (A) NEG^Negative /LPF       Assessment: 30 year old  at 36w1d by LMP c/w 8w6d US, here with PPROM during uncomplicated pregnancy.    Plan:    # PPROM  - Admit to labor and delivery for PPROM at 36w1d for IOL  - SSE with pooling, ROM+ positive. Subjectively low fluid on bedside US. GC/CT, wet prep, UA/Ucx, UDS collected  - Cephalic by BSUS  - Penicillin for GBS unknown, collected and pending  - Will give course of betamethasone pending negative COVID screen, patient is asymptomatic  - CBC, T&S, RPR  - Cvx: C/L/H, will start with IV pitocin given ROM.   - Pain: Desires epidural  - Anticipate     #  Fetal well-being  - Category I FHT, high baseline. Will give IVF bolus once IV placed  - GBS unknown, PCN ordered  - EFW 6#    # PNC:   - Rh+, Rubella immune,   - Normal anatomy US  - s/p Tdap  - Placenta posterior    Anticipate     Patient seen and care plan discussed under supervision of Dr. Restrepo.    Praveen Ventura MD  Obstetrics and Gyncology, PGY-3  2020 , 9:45 PM

## 2020-06-28 NOTE — PROVIDER NOTIFICATION
06/28/20 1520   Provider Notification   Provider Name/Title [facility]   Method of Notification In Department   Request Evaluate - Remote   Notification Reason Decels   Provider notified of recurrent late decels following SVE. Maternal position changed, report given to [place] RUSSO RN

## 2020-06-28 NOTE — PROGRESS NOTES
FHT Review Note      O:   Patient Vitals for the past 4 hrs:   BP Temp Temp src Resp   20 2345 -- 98  F (36.7  C) Oral 16   20 2245 125/76 98.1  F (36.7  C) Oral 16     FHT: Baseline 135, moderate variability, + accelerations, no decelerations  [place]: contractions every 2 minutes    A/P: 30 year old  at 36w2d by LMP c/w 8w6d US, here with PPROM during uncomplicated pregnancy.    # PPROM  # IOL   - PPROM at 36w1d for IOL, pooling, ROM+ positive, subjectively low fluid on bedside US.   - wet prep, UA, UDS negative, Ucx and GC/CT pending  - Penicillin for GBS unknown, collected and pending  - s/p BMZ x1, will give second dose if still pregnant  - Cvx: C/L/H on admission, on pitocin and titrating as able. Will reassess cervix as clinically indicated.   - Pain: Desires epidural  - Anticipate      #  Fetal well-being  - Category I FHT  - GBS unknown, PCN until delivery  - EFW 6#    Praveen Ventura MD  Obstetrics and Gyncology, PGY-3  2020 2:32 AM

## 2020-06-28 NOTE — PLAN OF CARE
VSS. Afebrile. Pt rested comfortably throughout the night. Pt reported bloody show and feeling cramping. Denied vision changes, RUQ pain, numbness/tingling, headache. See flowsheets for EFM/TOCO monitoring. Medicated w/ Abx for unknown GBS status x3. Pt anticipates epidural for labor pain. Pt declined SVE this AM. Plan per provider is SVE when pt is uncomfortable and to continue with pitocin. Will continue to monitor and anticipate .

## 2020-06-28 NOTE — L&D DELIVERY NOTE
OB Vaginal Delivery Note    Bhumika Gonsalez MRN# [id number]   Age: 30 year old YOB: 1990       GA: 36w2d  GP:   Labor Complications: None   EBL:   mL  Delivery QBL:    Delivery Type: Vaginal, Spontaneous   ROM to Delivery Time: (Delivered) Days: 1 Hours: 1 Minutes: 13  [place] Weight:     1 Minute 5 Minute 10 Minute   Apgar Totals:            GIOVANNY POWELL;SHIVA CRUZ;SHIRA TATE     Delivery Details  Stage 1:  30 year old  presented at 36 weeks 1 days with PROM. GBS unknown. Cervix was long and closed. Penicillin was started. Pitocin augmentation was started. She received one dose of betamethasone shortly after midnight, when COVID test came back negative.     Stage 2:  Patient did not labor down. She pushed effectively. She delivered a viable male infant with Apgars 8/9 over a first degree laceration. Weight is pending.   Delayed cord clamping was performed. Cord was clamped and cut.  NICU was in attendance due to prematurity and provided  resuscitation. See their documentation.     Stage 3:  Placenta delivered rapidly with active management, intact, three vessel cord.   first degree laceration was repaired with 3-0 vicryl in a single figure of 8.  EBL minimal, see QBL.  Sponge and needle counts correct.    Expected date of discharge: 2020    Giovanny Powell MD            :   Labor Event Times    Labor onset date:  20 Onset time:   3:00 PM   Dilation complete date:  20 Complete time:   5:20 PM   Start pushing date/time:  2020 1721      Labor Events     labor?:  Yes   steroids:  Partial Course  Labor Type:  Spontaneous, Augmentation  Predominate monitoring during 1st stage:  continuous electronic fetal monitoring     Antibiotics received during labor?:  Yes  Reason for Antibiotics:  GBS  Antibiotics received for GBS:  Penicillin  Antibiotics Given (GBS):  Greater than 4 hours prior to delivery     Rupture date/time: 20 1700    Rupture type:  Spontaneous rupture of membranes prior to induction  Fluid color:  Clear, Pink  Fluid odor:  Normal     Augmentation:  Oxytocin  Augmentation date/time:  6/27/20 2248   Indications for augmentation:  Prolonged ROM  1:1 continuous labor support provided by?:  RN Labor partogram used?:  no      Delivery/Placenta Date and Time    Delivery Date:  6/28/20 Delivery Time:   6:13 PM   Placenta Date/Time:  6/28/2020  6:17 PM  Oxytocin given at the time of delivery:  after delivery of baby     Labor Events and Shoulder Dystocia    Fetal Tracing Prior to Delivery:  Category 1  Shoulder dystocia present?:  Neg     Delivery (Maternal) (Provider to Complete) (336173)    Episiotomy:  None  Perineal lacerations:  1st Repaired?:  Yes      Blood Loss  Mother: Bhumika Gonsalez #[id number]   Start of Mother's Information    IO Blood Loss  06/28/20 1500 - 06/28/20 1831    None           End of Mother's Information  Mother: Bhumika Gonsalez #[id number]         Delivery - Provider to Complete ([id number])    Delivering clinician:  Adelaida Stevens MD  Attempted Delivery Types (Choose all that apply):  Spontaneous Vaginal Delivery  Delivery Type (Choose the 1 that will go to the Birth History):  Vaginal, Spontaneous   Other personnel:   Provider Role   Adelaida Stevens MD Obstetrician   Eleni Winters RN Registered Nurse   Derrick Arias MD          Placenta    Delayed Cord Clamping:  Done  Date/Time:  6/28/2020  6:17 PM  Removal:  Spontaneous  Disposition:  Hospital disposal     Anesthesia    Method:  Epidural  Cervical dilation at placement:  0-3          Presentation and Position    Presentation:  Vertex  Position:  Right Occiput Anterior           Adelaida Stevens MD

## 2020-06-28 NOTE — ANESTHESIA PREPROCEDURE EVALUATION
"Anesthesia Pre-Procedure Evaluation    Patient: Bhumika Gonsalez   MRN:     [id number] Gender:   female   Age:    30 year old :      1990        Preoperative Diagnosis: * No surgery found *        LABS:  CBC:   Lab Results   Component Value Date    WBC 12.1 (H) 2020    WBC 9.0 2019    HGB 12.6 2020    HGB 12.7 2020    HCT 37.3 2020    HCT 40.6 2019     2020     2019     BMP:   Lab Results   Component Value Date     2017    POTASSIUM 3.8 2017    CHLORIDE 107 2017    CO2 28 2017    BUN 11 2017    CR 0.76 2017    GLC 92 2017     COAGS: No results found for: PTT, INR, FIBR  POC: No results found for: BGM, HCG, HCGS  OTHER:   Lab Results   Component Value Date    EVELINE 8.9 2017    ALBUMIN 4.0 2017    PROTTOTAL 7.7 2017    ALT 16 2017    AST 13 2017    ALKPHOS 61 2017    BILITOTAL 1.1 2017    TSH 1.51 2018    T4 0.73 2014        Preop Vitals    BP Readings from Last 3 Encounters:   20 133/80   20 134/79   20 122/77    Pulse Readings from Last 3 Encounters:   20 100   20 81   20 104      Resp Readings from Last 3 Encounters:   20 18   19 16   19 16    SpO2 Readings from Last 3 Encounters:   20 100%   19 100%   19 98%      Temp Readings from Last 1 Encounters:   20 36.6  C (97.9  F) (Oral)    Ht Readings from Last 1 Encounters:   20 1.651 m (5' 5\")      Wt Readings from Last 1 Encounters:   20 85.3 kg (188 lb)    Estimated body mass index is 31.28 kg/m  as calculated from the following:    Height as of this encounter: 1.651 m (5' 5\").    Weight as of this encounter: 85.3 kg (188 lb).     LDA:  Peripheral IV 20 Left;Dorsal Hand (Active)   Site Assessment WDL 20 0730   Line Status Infusing 20 0730   Phlebitis Scale 0-->no symptoms 20 0730 "   Infiltration Scale 0 06/28/20 0730   Number of days: 1        Past Medical History:   Diagnosis Date     Alopecia universalis      Asthma     cats and humidity       Past Surgical History:   Procedure Laterality Date     DENTAL SURGERY  2010    wisdom teeth      Allergies   Allergen Reactions     Cats         Anesthesia Evaluation       history and physical reviewed .             ROS/MED HX    ENT/Pulmonary:     (+)asthma , . .    Neurologic:  - neg neurologic ROS     Cardiovascular:         METS/Exercise Tolerance:     Hematologic:         Musculoskeletal:         GI/Hepatic:     (+) GERD       Renal/Genitourinary:         Endo:         Psychiatric:         Infectious Disease:         Malignancy:         Other:                     JZG FV AN PHYSICAL EXAM    JCAYDENG FV AN PLAN NO PONV RULE    neg OB ROS             Liban Pacheco DO

## 2020-06-28 NOTE — PLAN OF CARE
Patient VSS and afebrile. Patient denies any s/s or infection, new pain or change in condition. 's moderate variability +  accels, ctx q2-4mins, Patient comfortable with her epidural, pitocin at 12mL/hr at time of note. Provider to perform SVE shortly. Patient notified when to call out to RN, verbalized understanding and agreed to plan. Will proceed with ongoing assessment.

## 2020-06-28 NOTE — PROGRESS NOTES
Labor Progress Note  S: Feeling cramps, but has overall been comfortable.    O:   Patient Vitals for the past 4 hrs:   Temp Temp src Resp   20 0400 98.6  F (37  C) Oral 16   20 0250 98.7  F (37.1  C) Oral 16     FHT: Baseline 130, moderate variability, + accelerations, no decelerations  [place]: contractions every 2-3 minutes    Pit at 6    A/P: 30 year old  at 36w2d by LMP c/w 8w6d US, here with PPROM during uncomplicated pregnancy.    # PPROM  # IOL   - PPROM at 36w1d for IOL, pooling, ROM+ positive, subjectively low fluid on bedside US.   - wet prep, UA, UDS negative, Ucx and GC/CT pending  - Penicillin for GBS unknown, collected and pending  - s/p BMZ x1, will give second dose if still pregnant  - Cvx: C/L/H on admission, on pitocin and titrating as able. Will reassess cervix as clinically indicated, currently comfortable and patient defers check.   - Pain: Desires epidural  - Anticipate      #  Fetal well-being  - Category I FHT  - GBS unknown, PCN until delivery  - EFW 6#    Praveen Ventura MD  Obstetrics and Gyncology, PGY-3  2020 6:00 AM

## 2020-06-29 LAB
BACTERIA SPEC CULT: NORMAL
BACTERIA SPEC CULT: NORMAL
Lab: NORMAL
SPECIMEN SOURCE: NORMAL

## 2020-06-29 PROCEDURE — 12000001 ZZH R&B MED SURG/OB UMMC

## 2020-06-29 PROCEDURE — 25000132 ZZH RX MED GY IP 250 OP 250 PS 637: Performed by: STUDENT IN AN ORGANIZED HEALTH CARE EDUCATION/TRAINING PROGRAM

## 2020-06-29 RX ORDER — ACETAMINOPHEN 325 MG/1
650 TABLET ORAL EVERY 6 HOURS PRN
Qty: 100 TABLET | Refills: 0 | Status: SHIPPED | OUTPATIENT
Start: 2020-06-29 | End: 2021-10-29

## 2020-06-29 RX ORDER — IBUPROFEN 600 MG/1
600 TABLET, FILM COATED ORAL EVERY 6 HOURS PRN
Qty: 60 TABLET | Refills: 0 | Status: SHIPPED | OUTPATIENT
Start: 2020-06-29 | End: 2021-10-29

## 2020-06-29 RX ORDER — AMOXICILLIN 250 MG
1 CAPSULE ORAL DAILY
Qty: 100 TABLET | Refills: 0 | Status: SHIPPED | OUTPATIENT
Start: 2020-06-29 | End: 2021-10-29

## 2020-06-29 RX ADMIN — ACETAMINOPHEN 650 MG: 325 TABLET, FILM COATED ORAL at 19:40

## 2020-06-29 RX ADMIN — IBUPROFEN 800 MG: 800 TABLET ORAL at 08:34

## 2020-06-29 RX ADMIN — ACETAMINOPHEN 650 MG: 325 TABLET, FILM COATED ORAL at 05:29

## 2020-06-29 RX ADMIN — DOCUSATE SODIUM 50 MG AND SENNOSIDES 8.6 MG 1 TABLET: 8.6; 5 TABLET, FILM COATED ORAL at 08:34

## 2020-06-29 RX ADMIN — DOCUSATE SODIUM 50 MG AND SENNOSIDES 8.6 MG 1 TABLET: 8.6; 5 TABLET, FILM COATED ORAL at 01:37

## 2020-06-29 RX ADMIN — DOCUSATE SODIUM AND SENNOSIDES 2 TABLET: 8.6; 5 TABLET, FILM COATED ORAL at 19:40

## 2020-06-29 RX ADMIN — ACETAMINOPHEN 650 MG: 325 TABLET, FILM COATED ORAL at 14:18

## 2020-06-29 RX ADMIN — IBUPROFEN 800 MG: 800 TABLET ORAL at 01:37

## 2020-06-29 RX ADMIN — IBUPROFEN 800 MG: 800 TABLET ORAL at 19:40

## 2020-06-29 RX ADMIN — IBUPROFEN 800 MG: 800 TABLET ORAL at 14:18

## 2020-06-29 RX ADMIN — ACETAMINOPHEN 650 MG: 325 TABLET, FILM COATED ORAL at 10:11

## 2020-06-29 NOTE — PROVIDER NOTIFICATION
06/29/20 0004   Provider Notification   Provider Name/Title Dr. Cardenas   Method of Notification Electronic Page   Request Evaluate-Remote   Notification Reason Other   Can you please reconcile the labor med orders for this patient.

## 2020-06-29 NOTE — PROGRESS NOTES
"[facility]  MATERNAL CHILD HEALTH   INITIAL NICU PSYCHOSOCIAL ASSESSMENT     DATA:     Reason for Social Work Consult: NICU admission    Presenting Information: Pt is a Late , appropriate for gestational age,  36w2d, 5 lb 12.8 oz (2630 g), male infant born vaginally following PROM w PTL gestation baby admitted to the NICU on 20 for further evaluation, monitoring and management of prematurity, respiratory failure, and possible sepsis. Parents are Bhumika and Rashad.     Living Situation: Bhumika and Rashad live in a house in [place].     Family Constellation: Baby Robinson is their first child. Bhumika and Rashad .     Social Support: Bhumika's family live locally and will be helping with childcare. Paternal aunt and grandparents also live close by \"we have a lot of support and are grateful\".        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 She has a 3 month maternity leave. Rashad works full time as an enrollment counselor at [employer].  He gets 3 weeks paternity leave and will be taking 1 additional week of PTO. He has been working from home since  due to COVID 19.  Denied stressors related to coordinating leave.    Insurance: Family is insured under Bhumika's job with the [employer]. Denied any insurance issues.     Source of Financial Support: Both parents work full time.    Mental Health History: Denied any history of mental health. Rashad has his masters in psychology and is aware of signs and symptoms of mental health concerns.     History of Postpartum Mood Disorders: Denied.    Chemical Health History: Denied.    Current Coping: Parents taking experience \"one step at a time\". Hope to both be allowed to be with Robinson during his NICU stay. Large support network.    Community Resources//Baby Supplies: Rashad and Bhumika identified they have all the necessary baby items, plus some (car seat, " crib, rocker, etc).     Interest in transferring to OSH closer to family home: NA    INTERVENTION:       SW completed chart review    Psychosocial Assessment    Introduction to Maternal Child Health  role and scope of practice    Orientation to the NICU. Encouraged to talk with nurse about NICU visitor restrictions due to COVID 19.    Reviewed Hospital and Community Resources     Assessed Chemical Health History and Current Symptoms     Assessed Mental Health History and Current Symptoms     Identified stressors, barriers and family concerns     Provided supportive counseling. Active empathetic listening and validation.     Provided psychoeducation on  mood and anxiety disorders, assessed for any current symptoms or history    ASSESSMENT:     Coping: adequate, functional, excited to be new parents. Understandably disappointed in NICU admission.     Affect: appropriate,  Stable, bright    Mood:  appropriate,  Stable, bright    Motivation/Ability to Access Services: Highly motivated, independent in accessing services,     Assessment of Support System: stable, involved, appropriate    Level of engagement with SW: They appeared open to and appreciative of SW support. Denied need for any resources at this time.   Engaged and appropriate. Able to seek out SW when needs arise.     Family s understanding of baby s medical situation: appropriate understanding    Family and parent/infant interactions:  Parents seem supportive of each other and are bonding with pt as they are able.     Assessment of parental risk for PMAD: Low risk despite unexpected NICU admission. Supportive partner, family.     Strengths: caring family, partner, planned and wanted pregnancy.     Vulnerabilities: None identified    Identified Barriers: None at this time.    PLAN:     SW will continue to follow throughout pt's Maternal-Child Health Journey as needs arise. SW will continue to collaborate with the multidisciplinary  team.        PHILLIP Alamo, [facility]  Pediatric Hem/Onc   Phone: [phone]  Pager: [phone]

## 2020-06-29 NOTE — PROGRESS NOTES
Post Partum Progress Note  PPD#1, s/p  after IOL for PPROM    Subjective:  She is walking around her room this AM. Plans to go to NICU. Pain well controlled. Tolerating PO intake. Lochia present and appropriate. Voiding without difficulty. Ambulating without dizziness or difficulty. She denies headache, chest pain, shortness of breath. Plans to breastfeed and is pumping.    Objective:  Patient Vitals for the past 24 hrs:   BP Temp Temp src Pulse Heart Rate Resp SpO2   20 0833 127/86 97.8  F (36.6  C) Oral -- 90 18 --   20 0537 112/73 97.5  F (36.4  C) Oral 85 -- 16 100 %   20 0130 124/72 98  F (36.7  C) Oral 92 -- 16 100 %   20 2200 122/81 97.9  F (36.6  C) Oral 94 -- 17 99 %   20 109/74 -- -- -- -- 16 --   20 113/55 -- -- -- -- 16 --   20 1945 116/58 -- -- -- -- 16 97 %   20 1935 120/57 -- -- -- -- 16 97 %   20 1920 134/59 -- -- -- -- 16 98 %   20 1905 (!) 144/72 -- -- -- -- 16 97 %   20 1845 113/63 -- -- -- -- 16 96 %   20 1830 107/58 -- -- -- -- 16 97 %   20 1815 119/62 -- -- -- -- 16 97 %   20 1800 127/67 98.4  F (36.9  C) Oral -- -- 16 97 %   20 1700 104/58 97.6  F (36.4  C) Oral -- -- 16 95 %   20 1600 93/55 98.6  F (37  C) Oral -- -- 16 96 %   20 1530 -- 98.3  F (36.8  C) Oral -- -- 16 96 %   20 1504 109/65 97.9  F (36.6  C) Oral -- -- -- --   20 1435 106/59 -- -- -- -- 18 --   20 1404 106/55 -- -- -- -- 17 --   20 1401 -- 98  F (36.7  C) -- -- -- -- --   20 1331 117/67 -- -- -- -- 18 97 %   20 1326 121/70 -- -- -- -- 16 98 %   20 1321 123/72 -- -- -- -- 17 99 %   20 1317 119/71 -- -- -- -- 18 --   20 1312 121/65 -- -- -- -- 17 --   20 1305 117/66 -- -- -- -- 17 --   20 1303 117/68 -- -- -- -- 16 --   20 1301 121/71 -- -- -- -- 17 100 %   20 1300 -- 98.2  F (36.8  C) Oral -- -- -- --   20 1259 122/74 -- -- -- --  17 --   20 1257 121/73 -- -- -- -- 17 --   20 1255 126/79 -- -- -- -- 18 --   20 1254 123/74 -- -- -- -- 17 --   20 1236 -- 98  F (36.7  C) Oral -- -- -- --   20 1147 -- 97.9  F (36.6  C) Oral -- -- -- --   20 1045 133/80 97.9  F (36.6  C) Oral -- -- 18 --   ]    General: NAD. A&Ox3.  CV: Well perfused.  Pulm: Normal respiratory effort.  Abd: Soft, non-tender, non-distended. Fundus is firm and at the umbilicus.   Ext: Trace edema. No calf tenderness.    Assessment/Plan:  Bhumika Gonsalez is a 30 year old  female who is PPD#1 s/p  after IOL for PPROM. Pregnancy uncomplicated. Doing well postpartum.    Postpartum care  - PNC: Rh positive. Rubella immune. No intervention indicated.  - Pain: Controlled on oral medications  - Heme: Hgb 12.6> EBL 45> No AM Hgb ordered.  - GI: Continue anti-emetics and stool softeners as needed.  - : Voiding spontaneously.  - Infant: Stable in NICU  - Feeding: Plans on breastfeeding.  - BC: Plans on condoms    Discharge to home likely tomorrow    Jeannie Dunaway MD  OB/GYN Resident, PGY-3  2020 6:27 AM     Physician Attestation   I, Leydi Valencia MD, personally examined and evaluated this patient.  I discussed the patient with the resident/fellow and care team, and agree with the assessment and plan of care as documented in the note of 2020.      I personally reviewed vital signs, medications and labs.    Castellanos findings: Bhumika Gonsalez is a 30 year old now  s/p  following IOL for PPROM.  Patient doing well.  Reports pain is controlled, tolerating regular diet, ambulating without dizziness.  Minimal blood loss during delivery, thus no postpartum Hgb ordered. Plan for discharge to home tomorrow.   Leydi Valencia MD  Date of Service (when I saw the patient): 20

## 2020-06-29 NOTE — PLAN OF CARE
VSS. Fundus Midline, firm, and U/1. Lochia scant. Pain adequately managed with tylenol and ibuprofen. Voiding without difficulty. Pumping independently and visiting baby in NICU throughout the day. Continue with plan of care.

## 2020-06-29 NOTE — LACTATION NOTE
This note was copied from a baby's chart.  D:  I met with Bhumika in her postpartum room today.  Robinson is her first baby.  Bhumika is normally in good health, takes no medications, and has no history of breast/chest surgery or trauma. She has already started to pump.    I:  I gave her a folder of introductory materials and went over pumping guidelines.    We talked about hands on pumping techniques, hand expression and how to access the [facility] websites. She said her insurer () will cover her pump, she has not decided whether to get a Spectra (which would be dispensed by [facility] staff) or a Pump in Style (which we could dispense).  I told her to let us know what she decides.  Later, I assisted her with putting her baby to breast.  He had an infilitrated IV and an 8 Fr oral feeding tube.  I pulled the latter, helped her position him in a cross cradle hold.  He made weak attempts to latch, did better after adding a 20 mm nipple shield.  He sucked a few times, then fell asleep.  A:  Mom has more information about pumping.  She feels more confident about putting him to breast tonight.  P:  Will continue to provide lactation support.      Karon Pham, RNC, IBCLC

## 2020-06-29 NOTE — PLAN OF CARE
Data: Bhumika Gonsalez transferred to 7132 via wheelchair at 2145. Baby transferred via parent's arms.  Action: Receiving unit notified of transfer: Yes. Patient and family notified of room change. Report given to JUAN Christiansen at 2137. Belongings sent to receiving unit. Accompanied by Registered Nurse. Oriented patient to surroundings. Call light within reach. ID bands double-checked with receiving RN.  Response: Patient tolerated transfer and is stable.

## 2020-06-29 NOTE — PLAN OF CARE
Patient arrived to [facility] unit via wheelchair at 2145 ,with belongings, accompanied by spouse/ significant other, with infant in arms. Received report from  JUAN Peter  and checked bands. Unit and room orientation completd. Call light given; no concerns present at this time. Continue with plan of care.

## 2020-06-29 NOTE — PLAN OF CARE
VSS and postpartum assessment WDL. She is up ad federico, voiding, pain managed with ibuprofen and tylenol. Perineum appears to be healing well with no s/s infection. Uterus firm and midline. Scant lochia rubra. No breast or nipple pain; pumping independently.  Support person,  Rashad, present at bedside;  went to the NICU at beginning of night shift, parents did not visit on night shift. Education provided on plan of care, self-care. Continue with plan of care.

## 2020-06-29 NOTE — PLAN OF CARE
Vaginal Delivery Note   of viable Male with , Dr. Arias and Eleni Winters, RN in attendance.  NICU present for  infant, Nursery RN Ann BUTLER present.  Infant with spontaneous cry, to mother's abdomen, dried and stimulated.  APGAR at 1 minute:  8 and APGAR at 5 minutes:  9.  Placenta delivered with out complication, pitocin infused per order set, 1st degree laceration, with repair, mynor cares provided.  Mother and baby in stable condition.

## 2020-06-30 VITALS
TEMPERATURE: 98.1 F | BODY MASS INDEX: 31.32 KG/M2 | HEIGHT: 65 IN | SYSTOLIC BLOOD PRESSURE: 117 MMHG | OXYGEN SATURATION: 100 % | RESPIRATION RATE: 16 BRPM | DIASTOLIC BLOOD PRESSURE: 86 MMHG | WEIGHT: 188 LBS | HEART RATE: 80 BPM

## 2020-06-30 PROCEDURE — 25000132 ZZH RX MED GY IP 250 OP 250 PS 637: Performed by: STUDENT IN AN ORGANIZED HEALTH CARE EDUCATION/TRAINING PROGRAM

## 2020-06-30 RX ADMIN — IBUPROFEN 800 MG: 800 TABLET ORAL at 01:33

## 2020-06-30 RX ADMIN — ACETAMINOPHEN 650 MG: 325 TABLET, FILM COATED ORAL at 00:14

## 2020-06-30 RX ADMIN — DOCUSATE SODIUM AND SENNOSIDES 2 TABLET: 8.6; 5 TABLET, FILM COATED ORAL at 08:50

## 2020-06-30 RX ADMIN — IBUPROFEN 800 MG: 800 TABLET ORAL at 08:50

## 2020-06-30 NOTE — PLAN OF CARE
VSS and postpartum assessment WDL. She is up ad federico, voiding, pain managed with ibuprofen and tylenol. Perineum appears to be healing well with no s/s infection. Uterus firm and midline. Scant lochia rubra. No breast or nipple pain; pumping independently. Passing flatus. Support person  present at bedside; patient goes to visit baby often in NICU. Education provided on plan of care, self-care, breastfeeding position, help with latch (went to NICU). Continue with plan of care.

## 2020-06-30 NOTE — PROVIDER NOTIFICATION
Pt is wondering about a boarding room for discharge today. Please let me know. Text paged and updated AMAN BALLESTEROS

## 2020-06-30 NOTE — PLAN OF CARE
Vital signs within normal limits. Postpartum checks within normal limits. Patient eating and drinking normally. Patient able to empty bladder independently and is up ambulating. Afebrile. Pain well managed. Breastfeeding infant at NICU. No concerns at the time. Will continue with poc.

## 2020-06-30 NOTE — PLAN OF CARE
VSS. AFebrile. VOiding and passing gas. Scant lochia. No PIV found. Breast pump given to pt. C/o minimal pain and medicated with relief. FOB here and supportive. Discharge instructions and meds reviewed and given to pt. Discahrged today but will board in room until baby transfers up to 11th floor in which pt will join baby.

## 2020-06-30 NOTE — PROGRESS NOTES
Post Partum Progress Note  PPD#2, s/p  after IOL for PPROM    Subjective:  She is walking around her room this AM. Plans to go to NICU to see her baby. Pain well controlled. Tolerating PO intake. Lochia present and appropriate. Voiding without difficulty. Ambulating without dizziness. She denies headache, chest pain, shortness of breath. Has been breastfeeding.    Objective:  Patient Vitals for the past 24 hrs:   BP Temp Temp src Pulse Heart Rate Resp   20 0018 124/64 97.6  F (36.4  C) Oral 91 -- 16   20 1943 113/68 97.8  F (36.6  C) Oral 80 -- 18   20 0833 127/86 97.8  F (36.6  C) Oral -- 90 18     General: NAD. A&Ox3.  CV: Well perfused.  Pulm: Normal respiratory effort.  Abd: Soft, non-tender, non-distended. Fundus is firm and below the umbilicus.   Ext: Trace edema. No calf tenderness.    Assessment/Plan:  Bhumika Gonsalez is a 30 year old  female who is PPD#2 s/p  after IOL for PPROM. Pregnancy uncomplicated. Doing well postpartum.    Postpartum care  - PNC: Rh positive. Rubella immune. No intervention indicated.  - Pain: Controlled on oral medications  - Heme: Hgb 12.6> EBL 45> No AM Hgb ordered.  - GI: Continue anti-emetics and stool softeners as needed.  - : Voiding spontaneously.  - Infant: Stable in NICU  - Feeding: Plans on breastfeeding.  - BC: Plans on condoms    Discharge today    Jeannie Dunaway MD  OB/GYN Resident, PGY-3  2020 6:27 AM         Physician Attestation   I, Aurelia Huston, personally saw and evaluated Bhumika Gonsalez.  I have reviewed the above note and agree with details and plan for discharge.   Aurelia Huston MD  2020

## 2020-07-06 NOTE — ANESTHESIA PROCEDURE NOTES
Epidural Procedure Note  Staff -   Anesthesiologist:  Liban Pacheco DO      Performed By: anesthesiologist          Location: OB   Pre-procedure checklist:   patient identified, IV checked, site marked, risks and benefits discussed, informed consent, monitors and equipment checked, pre-op evaluation, at physician/surgeon's request and post-op pain management      Correct Patient: Yes      Correct Position: Yes      Correct Site: Yes      Correct Procedure: Yes      Correct Laterality:  Yes    Site Marked:  Yes  Procedure:     Procedure:  Epidural catheter    ASA:  2    Diagnosis:  Intrauterine pregnancy    Position:  Sitting    Sterile Prep: chloraprep      Insertion site:  L3-4    Local skin infiltration:  1% lidocaine    amount (mL):  3    Approach:  Midline    Needle gauge (G):  17    Needle Length (in):  5    Block Needle Type:  Touhy    Injection Technique:  LORT saline    OZ at (cm):  5    Attempts:  1    Redirects:  0    Catheter gauge (G):  19    Catheter threaded easily: Yes      Threaded to cm at skin:  10    Threaded in epidural space (cm):  5    Paresthesias:  No    Aspiration negative for Heme or CSF: Yes      Test dose (mL):  3     Local anesthetic:  Lidocaine 1.5% w/ 1:200,000 epinephrine    Test dose negative for signs of intravascular, subdural or intrathecal injection: Yes

## 2020-07-08 ENCOUNTER — MEDICAL CORRESPONDENCE (OUTPATIENT)
Dept: HEALTH INFORMATION MANAGEMENT | Facility: CLINIC | Age: 30
End: 2020-07-08

## 2020-08-10 ENCOUNTER — PRENATAL OFFICE VISIT (OUTPATIENT)
Dept: OBGYN | Facility: CLINIC | Age: 30
End: 2020-08-10
Payer: OTHER GOVERNMENT

## 2020-08-10 VITALS
HEART RATE: 88 BPM | DIASTOLIC BLOOD PRESSURE: 67 MMHG | SYSTOLIC BLOOD PRESSURE: 111 MMHG | WEIGHT: 171 LBS | BODY MASS INDEX: 28.46 KG/M2 | TEMPERATURE: 97.5 F

## 2020-08-10 PROCEDURE — 99207 ZZC POST PARTUM EXAM: CPT | Performed by: OBSTETRICS & GYNECOLOGY

## 2020-08-10 ASSESSMENT — PATIENT HEALTH QUESTIONNAIRE - PHQ9: SUM OF ALL RESPONSES TO PHQ QUESTIONS 1-9: 3

## 2020-08-10 NOTE — PROGRESS NOTES
OB GYN CLINIC VISIT  8/10/2020    CC: postpartum visit    SUBJECTIVE:  Maile Gonsalez is a 30 year old female  here for a postpartum visit.  She had a  on 20 delivering a healthy baby boy Robinson weighing 5 lbs 12 oz at 36w2d.      PHQ-9 SCORE 2020 8/10/2020   PHQ-9 Total Score 2 3     No flowsheet data found.  OB History    Para Term  AB Living   1 1 0 1 0 1   SAB TAB Ectopic Multiple Live Births   0 0 0 0 1      # Outcome Date GA Lbr Stephen/2nd Weight Sex Delivery Anes PTL Lv   1  20 36w2d 02:20 / 00:53 2.63 kg (5 lb 12.8 oz) M Vag-Spont EPI Y NHUNG      Name: LONI,MALE-MAILE      Apgar1: 8  Apgar5: 9         delivery complications:  No  breast feeding:  Yes  bladder problems:  No  bowel problems/hemorrhoids:  No  episiotomy/laceration/incision healed? Yes  vaginal flow:  None  Poth:  No  contraception:  condoms  emotional adjustment:  doing well, happy and tired  back to work:  Not yet    Current Outpatient Medications   Medication     Prenatal MV-Min-Fe Fum-FA-DHA (PRENATAL+DHA PO)     acetaminophen (TYLENOL) 325 MG tablet     albuterol (PROAIR HFA/PROVENTIL HFA/VENTOLIN HFA) 108 (90 Base) MCG/ACT inhaler     ibuprofen (ADVIL/MOTRIN) 600 MG tablet     loratadine (CLARITIN) 10 MG tablet     order for DME     senna-docusate (SENOKOT-S/PERICOLACE) 8.6-50 MG tablet     No current facility-administered medications for this visit.        OBJECTIVE:  Blood pressure 111/67, pulse 88, temperature 97.5  F (36.4  C), temperature source Oral, weight 77.6 kg (171 lb), last menstrual period 10/18/2019, currently breastfeeding.   General - pleasant female in no acute distress.  Breast - no nodularity, asymmetry or nipple discharge bilaterally.  Abdomen - soft, nontender, nondistended, no hepatosplenomegaly.  Pelvic - EG: normal adult female, BUS: within normal limits, Vagina: well rugated, no discharge, Cervix: normal, no lesions or CMT, Uterus: firm, normal sized and nontender,  Adnexae: no masses or tenderness.  Rectovaginal - deferred.    ASSESSMENT:  30 year old  with normal postpartum exam    PLAN:  1. Routine postpartum follow-up    Discussed kegel exercises   May resume normal activities without restrictions  Pap smear was not  done today (due in )    The patient will use condoms for birth control. Full counseling was provided, and all questions answered. Compliance is strongly emphasized.  Return to clinic in one year for an annual, when due for a pap smear or PRN.    Flakita De La Torre MD

## 2020-09-09 ENCOUNTER — ALLIED HEALTH/NURSE VISIT (OUTPATIENT)
Dept: NURSING | Facility: CLINIC | Age: 30
End: 2020-09-09
Payer: OTHER GOVERNMENT

## 2020-09-09 DIAGNOSIS — Z23 NEED FOR PROPHYLACTIC VACCINATION AND INOCULATION AGAINST INFLUENZA: Primary | ICD-10-CM

## 2020-09-09 PROCEDURE — 90686 IIV4 VACC NO PRSV 0.5 ML IM: CPT

## 2020-09-09 PROCEDURE — 90471 IMMUNIZATION ADMIN: CPT

## 2021-06-05 NOTE — PROGRESS NOTES
"Please see \"Imaging\" tab under \"Chart Review\" for details of today's visit.    Amita Mclean        "

## 2021-06-05 NOTE — PROGRESS NOTES
[facility] [place]  Genetic Counseling Consult    Patient: Bhumika Gonsalez YOB: 1990   Date of Service: 01/15/2020      Bhumika Gonsalez was seen at [facility] [place] for genetic consultation to discuss the options for screening and testing for fetal chromosome abnormalities.  The indication for genetic counseling is routine screening for aneuploidy.        Impression/Plan:   1.  Bhumika had an ultrasound and blood draw for first trimester screening.  Results are expected within 3-5 days, and will be available in [facility].  We will contact her to discuss the results, and a copy will be forwarded to the office of the referring OB provider.  Bhumika will be contacted at the number she provided, [phone], and requests that detailed results be left in her voicemail if she cannot be reached.    2.  Maternal serum AFP (single marker screen) is recommended after 15 weeks to screen for open neural tube defects. A quad screen should not be performed.    Pregnancy History:   /Parity:    Age at Delivery: 30 y.o.  ROBLES: 2020, by Last Menstrual Period  Gestational Age: 12w5d    No significant complications or exposures were reported in the current pregnancy.    Medical History:   Bhumika s reported medical history includes a history of alopecia.  We discussed that the genetics of alopecia are not well understood, but it may be seen to affect multiple family members due to complex/multifactorial inheritance.  We discussed that Bhumika's children may be at increased risk over the general population to have alopecia as well, but there is not specific genetic testing available to determine an individual's risks.        Family History:   A three-generation pedigree was obtained, and is scanned under the  Media  tab.   The following significant findings were reported by Bhumika:  Bhumika reports a maternal aunt with unknown cognitive impairment and birth  defects attributed to pregnancy/delivery complications.  If this individual's health concerns are attributed to a specific event or injury, this history is not expected to impact risks for Bhumika's pregnancy.     Otherwise, the reported family history is negative for multiple miscarriages, stillbirths, birth defects, cognitive impairment, known genetic conditions, and consanguinity.       Carrier Screening:   The patient reports that she and the father of the pregnancy have  ancestry:     Cystic fibrosis is an autosomal recessive genetic condition that occurs with increased frequency in individuals of  ancestry and carrier screening for this condition is available.  In addition,  screening in the [facility] includes cystic fibrosis.    The patient reports that she and the father of the pregnancy have  ancestry:      The hemoglobinopathies are a group of genetic blood diseases that occur with increased frequency in individuals of  ancestry and carrier screening for these conditions is available.  Carrier screening for the hemoglobinopathies includes a CBC with red blood cell indices, a ferritin level, and a quantitative hemoglobin electrophoresis or HPLC.  In addition,  screening in the [facility] includes many of the hemoglobinopathies.      Expanded carrier screening for mutations in a large panel of genes associated with autosomal recessive conditions including cystic fibrosis, spinal muscular atrophy, and others, is now available.      The patient has declined the carrier screening options reviewed today.       Risk Assessment for Chromosome Conditions:   We explained that the risk for fetal chromosome abnormalities increases with maternal age. We discussed specific features of common chromosome abnormalities, including Down syndrome, trisomy 13, trisomy 18, and sex chromosome trisomies.      - At age 29 at midtrimester, the risk to have a baby with Down  syndrome is 1 in 928.     - At age 29 at midtrimester, the risk to have a baby with any chromosome abnormality is 1 in 464.          Testing Options:   We discussed the following options:   First trimester screening    First trimester ultrasound with nuchal translucency and nasal bone assessments, maternal plasma hCG, OSORIO-A, and AFP measurement    Screens for fetal trisomy 21, trisomy 13, and trisomy 18    Cannot screen for open neural tube defects; maternal serum AFP after 15 weeks is recommended    ,  Non-invasive Prenatal Testing (NIPT)    Maternal plasma cell-free DNA testing; first trimester ultrasound with nuchal translucency and nasal bone assessment is recommended, when appropriate    Screens for fetal trisomy 21, trisomy 13, trisomy 18, and sex chromosome aneuploidy    Cannot screen for open neural tube defects; maternal serum AFP after 15 weeks is recommended      ,  Genetic Amniocentesis    Invasive procedure typically performed in the second trimester by which amniotic fluid is obtained for the purpose of chromosome analysis and/or other prenatal genetic analysis    Diagnostic results; >99% sensitivity for fetal chromosome abnormalities    AFAFP measurement tests for open neural tube defects     and  Comprehensive (Level II) ultrasound: Detailed ultrasound performed between 18-22 weeks gestation to screen for major birth defects and markers for aneuploidy.          We reviewed the benefits and limitations of this testing.  Screening tests provide a risk assessment specific to the pregnancy for certain fetal chromosome abnormalities, but cannot definitively diagnose or exclude a fetal chromosome abnormality.  Follow-up genetic counseling and consideration of diagnostic testing is recommended with any abnormal screening result.     Diagnostic tests carry inherent risks- including risk of miscarriage- that require careful consideration.  These tests can detect fetal chromosome abnormalities with greater  than 99% certainty.  Results can be compromised by maternal cell contamination or mosaicism, and are limited by the resolution of cytogenetic G-banding technology.  There is no screening nor diagnostic test that can detect all forms of birth defects or mental disability.     It was a pleasure to be involved with Bhumika s care. Face-to-face time of the meeting was 30 minutes.    Austin Jeong MS, [facility]  Licensed Genetic Counselor  Phone: [phone]  Pager: [phone]

## 2021-06-05 NOTE — TELEPHONE ENCOUNTER
1/20/2020         Called Bhumika to discuss first trimester screen results.  Results are screen negative for Down syndrome and trisomy 18/13.  Post test risks are 1/>10,000 for these conditions. Bhumika had no questions at this time and was encouraged to reach out if she has any questions or concerns in the future.        Austin Jeong MS, [facility]  Licensed Genetic Counselor  Phone: [phone]  Pager: [phone]

## 2021-09-12 ENCOUNTER — HEALTH MAINTENANCE LETTER (OUTPATIENT)
Age: 31
End: 2021-09-12

## 2021-10-29 ENCOUNTER — OFFICE VISIT (OUTPATIENT)
Dept: FAMILY MEDICINE | Facility: CLINIC | Age: 31
End: 2021-10-29
Payer: COMMERCIAL

## 2021-10-29 VITALS
SYSTOLIC BLOOD PRESSURE: 116 MMHG | BODY MASS INDEX: 28.62 KG/M2 | HEART RATE: 83 BPM | RESPIRATION RATE: 16 BRPM | DIASTOLIC BLOOD PRESSURE: 72 MMHG | OXYGEN SATURATION: 100 % | TEMPERATURE: 98.7 F | WEIGHT: 172 LBS

## 2021-10-29 DIAGNOSIS — Z00.00 ROUTINE GENERAL MEDICAL EXAMINATION AT A HEALTH CARE FACILITY: Primary | ICD-10-CM

## 2021-10-29 PROBLEM — H52.209 ASTIGMATISM: Status: ACTIVE | Noted: 2021-10-29

## 2021-10-29 PROBLEM — O42.919 PRETERM PREMATURE RUPTURE OF MEMBRANES (PPROM) WITH UNKNOWN ONSET OF LABOR: Status: RESOLVED | Noted: 2020-06-27 | Resolved: 2021-10-29

## 2021-10-29 PROBLEM — Z23 NEED FOR TDAP VACCINATION: Status: RESOLVED | Noted: 2019-12-05 | Resolved: 2021-10-29

## 2021-10-29 PROBLEM — Z34.00 SUPERVISION OF NORMAL FIRST PREGNANCY, ANTEPARTUM: Status: RESOLVED | Noted: 2020-01-07 | Resolved: 2021-10-29

## 2021-10-29 PROBLEM — H52.10 MYOPIA: Status: ACTIVE | Noted: 2021-10-29

## 2021-10-29 PROBLEM — Z02.89 HEALTH EXAMINATION OF DEFINED SUBPOPULATION: Status: ACTIVE | Noted: 2021-10-29

## 2021-10-29 PROBLEM — Z36.89 ENCOUNTER FOR TRIAGE IN PREGNANT PATIENT: Status: RESOLVED | Noted: 2020-06-27 | Resolved: 2021-10-29

## 2021-10-29 PROCEDURE — 99395 PREV VISIT EST AGE 18-39: CPT | Performed by: FAMILY MEDICINE

## 2021-10-29 PROCEDURE — G0145 SCR C/V CYTO,THINLAYER,RESCR: HCPCS | Performed by: FAMILY MEDICINE

## 2021-10-29 RX ORDER — ALBUTEROL SULFATE 90 UG/1
1-2 AEROSOL, METERED RESPIRATORY (INHALATION) EVERY 6 HOURS PRN
Qty: 8.5 G | Refills: 1 | Status: SHIPPED | OUTPATIENT
Start: 2021-10-29 | End: 2022-10-10

## 2021-10-29 RX ORDER — ALBUTEROL SULFATE 90 UG/1
1-2 AEROSOL, METERED RESPIRATORY (INHALATION) EVERY 6 HOURS PRN
Qty: 8.5 G | Refills: 1 | Status: SHIPPED | OUTPATIENT
Start: 2021-10-29 | End: 2021-10-29

## 2021-10-29 ASSESSMENT — ENCOUNTER SYMPTOMS
CONSTIPATION: 0
HEMATURIA: 0
PARESTHESIAS: 0
PALPITATIONS: 0
DIZZINESS: 0
SORE THROAT: 0
ABDOMINAL PAIN: 0
COUGH: 0
DIARRHEA: 0
CHILLS: 0
EYE PAIN: 0
HEMATOCHEZIA: 0
HEADACHES: 0
WEAKNESS: 0
ARTHRALGIAS: 0
NAUSEA: 0
FREQUENCY: 0
NERVOUS/ANXIOUS: 0
DYSURIA: 0
MYALGIAS: 0
HEARTBURN: 0
SHORTNESS OF BREATH: 0
FEVER: 0
JOINT SWELLING: 0

## 2021-10-29 NOTE — PROGRESS NOTES
SUBJECTIVE:   CC: Bhumika Gonsalez is an 31 year old woman who presents for preventive health visit.       Patient has been advised of split billing requirements and indicates understanding: Yes  Healthy Habits:     Getting at least 3 servings of Calcium per day:  Yes    Bi-annual eye exam:  Yes    Dental care twice a year:  Yes    Sleep apnea or symptoms of sleep apnea:  None    Diet:  Regular (no restrictions)    Frequency of exercise:  6-7 days/week    Duration of exercise:  15-30 minutes    Taking medications regularly:  Yes    Medication side effects:  None    PHQ-2 Total Score: 0    Additional concerns today:  No      Today's PHQ-2 Score:   PHQ-2 ( 1999 Pf[employer]) 10/29/2021   Q1: Little interest or pleasure in doing things 0   Q2: Feeling down, depressed or hopeless 0   PHQ-2 Score 0   Q1: Little interest or pleasure in doing things Not at all   Q2: Feeling down, depressed or hopeless Not at all   PHQ-2 Score 0       Abuse: Current or Past (Physical, Sexual or Emotional) - No  Do you feel safe in your environment? Yes    Have you ever done Advance Care Planning? (For example, a Health Directive, POLST, or a discussion with a medical provider or your loved ones about your wishes): No, advance care planning information given to patient to review.  Patient plans to discuss their wishes with loved ones or provider.      Social History     Tobacco Use     Smoking status: Never Smoker     Smokeless tobacco: Never Used   Substance Use Topics     Alcohol use: Not Currently       Alcohol Use 10/29/2021   Prescreen: >3 drinks/day or >7 drinks/week? No   Prescreen: >3 drinks/day or >7 drinks/week? -       Reviewed orders with patient.  Reviewed health maintenance and updated orders accordingly - Yes      Breast Cancer Screening:    Breast CA Risk Assessment (FHS-7) 10/29/2021   Do you have a family history of breast, colon, or ovarian cancer? No / Unknown         Patient under 40 years of age: Routine Mammogram Screening  not recommended.   Pertinent mammograms are reviewed under the imaging tab.    History of abnormal Pap smear: NO - age 21-29 PAP every 3 years recommended  PAP / HPV 9/18/2018   PAP (Historical) NIL     Reviewed and updated as needed this visit by clinical staff  Tobacco  Allergies  Meds  Problems  Med Hx  Surg Hx  Fam Hx  Soc Hx          Reviewed and updated as needed this visit by Provider  Tobacco  Allergies  Meds  Problems  Med Hx  Surg Hx  Fam Hx             Review of Systems   Constitutional: Negative for chills and fever.   HENT: Negative for congestion, ear pain, hearing loss and sore throat.    Eyes: Negative for pain and visual disturbance.   Respiratory: Negative for cough and shortness of breath.    Cardiovascular: Negative for chest pain, palpitations and peripheral edema.   Gastrointestinal: Negative for abdominal pain, constipation, diarrhea, heartburn, hematochezia and nausea.   Genitourinary: Negative for dysuria, frequency, genital sores, hematuria and urgency.   Musculoskeletal: Negative for arthralgias, joint swelling and myalgias.   Skin: Negative for rash.   Neurological: Negative for dizziness, weakness, headaches and paresthesias.   Psychiatric/Behavioral: Negative for mood changes. The patient is not nervous/anxious.           OBJECTIVE:   /72   Pulse 83   Temp 98.7  F (37.1  C)   Resp 16   Wt 78 kg (172 lb)   SpO2 100%   Breastfeeding No   BMI 28.62 kg/m    Physical Exam  GENERAL: healthy, alert and no distress  EYES: Eyes grossly normal to inspection, PERRL and conjunctivae and sclerae normal  HENT: ear canals and TM's normal, nose and mouth without ulcers or lesions  NECK: no adenopathy, no asymmetry, masses, or scars and thyroid normal to palpation  RESP: lungs clear to auscultation - no rales, rhonchi or wheezes  BREAST: normal without masses, tenderness or nipple discharge and no palpable axillary masses or adenopathy  CV: regular rate and rhythm, normal S1  "S2, no S3 or S4, no murmur, click or rub, no peripheral edema and peripheral pulses strong  ABDOMEN: soft, nontender, no hepatosplenomegaly, no masses and bowel sounds normal   (female): normal female external genitalia, normal urethral meatus, vaginal mucosa pink, moist, well rugated, and normal cervix/adnexa/uterus without masses or discharge  MS: no gross musculoskeletal defects noted, no edema  SKIN: no suspicious lesions or rashes  NEURO: Normal strength and tone, mentation intact and speech normal  PSYCH: mentation appears normal, affect normal/bright    Diagnostic Test Results:  Labs reviewed in Epic    ASSESSMENT/PLAN:   Bhumika was seen today for physical.    Diagnoses and all orders for this visit:    Routine general medical examination at a health care facility  Comments:  Due for pap today.  Refilled albuterol which she only uses after she drinks.  Up to date on shots including influenza; updated.  Follow up yearly  Orders:  -     Discontinue: albuterol (PROAIR HFA/PROVENTIL HFA/VENTOLIN HFA) 108 (90 Base) MCG/ACT inhaler; Inhale 1-2 puffs into the lungs every 6 hours as needed for shortness of breath / dyspnea or wheezing  -     albuterol (PROAIR HFA/PROVENTIL HFA/VENTOLIN HFA) 108 (90 Base) MCG/ACT inhaler; Inhale 1-2 puffs into the lungs every 6 hours as needed for shortness of breath / dyspnea or wheezing  -     Pap Screen reflex to HPV if ASCUS - recommend age 25 - 29; Future    Other orders  -     REVIEW OF HEALTH MAINTENANCE PROTOCOL ORDERS        Patient has been advised of split billing requirements and indicates understanding: Yes  COUNSELING:  Reviewed preventive health counseling, as reflected in patient instructions    Estimated body mass index is 28.62 kg/m  as calculated from the following:    Height as of 6/27/20: 1.651 m (5' 5\").    Weight as of this encounter: 78 kg (172 lb).    Weight management plan: Discussed healthy diet and exercise guidelines    She reports that she has never " smoked. She has never used smokeless tobacco.      Counseling Resources:  ATP IV Guidelines  Pooled Cohorts Equation Calculator  Breast Cancer Risk Calculator  BRCA-Related Cancer Risk Assessment: FHS-7 Tool  FRAX Risk Assessment  ICSI Preventive Guidelines  Dietary Guidelines for Americans, 2010  USDA's MyPlate  ASA Prophylaxis  Lung CA Screening    Ann Mittal MD  [facility]

## 2021-11-02 LAB
BKR LAB AP GYN ADEQUACY: NORMAL
BKR LAB AP GYN INTERPRETATION: NORMAL
BKR LAB AP HPV REFLEX: NORMAL
BKR LAB AP PREVIOUS ABNORMAL: NORMAL
PATH REPORT.COMMENTS IMP SPEC: NORMAL
PATH REPORT.RELEVANT HX SPEC: NORMAL

## 2022-02-10 ENCOUNTER — OFFICE VISIT (OUTPATIENT)
Dept: OBGYN | Facility: CLINIC | Age: 32
End: 2022-02-10
Payer: COMMERCIAL

## 2022-02-10 VITALS
BODY MASS INDEX: 27.49 KG/M2 | WEIGHT: 165 LBS | SYSTOLIC BLOOD PRESSURE: 120 MMHG | HEIGHT: 65 IN | DIASTOLIC BLOOD PRESSURE: 80 MMHG

## 2022-02-10 DIAGNOSIS — N92.6 IRREGULAR MENSES: Primary | ICD-10-CM

## 2022-02-10 PROCEDURE — 99213 OFFICE O/P EST LOW 20 MIN: CPT | Performed by: OBSTETRICS & GYNECOLOGY

## 2022-02-10 RX ORDER — LETROZOLE 2.5 MG/1
TABLET, FILM COATED ORAL
Qty: 10 TABLET | Refills: 0 | Status: SHIPPED | OUTPATIENT
Start: 2022-02-10 | End: 2022-03-31

## 2022-02-10 RX ORDER — MEDROXYPROGESTERONE ACETATE 10 MG
10 TABLET ORAL DAILY
Qty: 10 TABLET | Refills: 3 | Status: SHIPPED | OUTPATIENT
Start: 2022-02-10 | End: 2022-02-20

## 2022-02-10 ASSESSMENT — MIFFLIN-ST. JEOR: SCORE: 1474.32

## 2022-02-10 NOTE — PATIENT INSTRUCTIONS
Cycle instructions:    The first day of bleeding/period is called Day 1.    Letrozole is taken Days 3-7 of cycle, about same time each day.  Take the medication even if you are still bleeding.    Call when you get your period to schedule an ultrasound to check for follicles on your ovaries.  This should be done about Day 12-14 of cycle.  (we do not have ultrasound on the weekends)  The phone number is [phone].    Start testing for ovulation using the store bought ovulation predictor kits on Day 11 of cycle.  When you get a positive surge, you will most likely be ovulating within the next 24 hours.       Test the urine about the same time each day.         The test is positive when you see 2 dark solid lines, or a smiley face.  (one faint line and one dark line is NOT positive)         Once the test is positive, you can stop testing.  Have sex/intercourse the day the test is positive.  The next day, have sex again.    Schedule a lab only appointment for about 7-9 days after your ovulation test is positive to check the progesterone level and make sure ovulation occurred.    Make appointment to come back to clinic for infertility follow-up visit about Day 24-26 of cycle so we can review all of the labs and ultrasound results and make dose adjustments if needed.     You can also do an E Visit if you are a my chart patient.   Start the visit by telling me your LMP (last menstrual period), any side effects of letrozole and cycle day of your LH surge.    If you don't have a period by 15 days after LH kit is positive (surge) then do urine pregnancy test and call/email clinic if positive.      Basic info:  The ovulation predictor kits are found in the condom/tampon section of the store.  Clear blue easy brand is simple to read.  Most women will get a positive LH surge before day 20 of the cycle.  Letrozole is not FDA approved, but limited studies showing pregnancy rate higher than clomid especially when clomid has  failed.  Aromatase inhibitors are generally well tolerated. The main side effects are hot flushes and gastrointestinal events (nausea and vomiting), headache, back pain, and leg cramps.  Take it at same time each night to minimize side effects.  There is a chance of twins when taking letrozole as well as making too many cysts on the ovaries.  Do not use lubricants with intercourse when trying to get pregnant. (Pre-Seed, etc is okay)      Schedule an ultrasound for about day 14 of the cycle and see if you have a 2 cm follicle on the ovary and about 9 mm endometrium.  Typically follicles grow 2 mm/day so we can  when ovulation should be happening/ready based on the ultrasound and sometimes may need a repeat ultrasound done a few days after last ultrasound to confirm this is happening.  IF it looks like you should be ovulating but have not surged on your own (+OPK), would do an HCG bump to trigger ovulation.      If we do HCG bump, recommend nightly prometrium 200 mg by mouth to support luteal phase.  If no menses 15 days from HCG shot, do pregnancy test and call/email if positive.  If you are pregnant we will continue prometrium until about 10 weeks along.   We will also schedule an early ultrasound for about 7-8 weeks.   DO NOT do pregnancy test before 2 weeks after the HCG shot as it will often be a false positive.    If pregnancy test is negative, then stop prometrium.

## 2022-02-10 NOTE — PROGRESS NOTES
CC:  Getting pregnant  HPI:  Bhumika Gonsalez is a 31 year old female Patient's last menstrual period was 01/03/2022.  Menses are irregular. Son is now 19 months old and ready to have another child.     We used letrozole 5 mg with HCG bump (cycle day 15) and Prometrium first cycle to conceive that baby.     Allergies: Cats      ASSESSMENT/PLAN:  (N92.6) Irregular menses  (primary encounter diagnosis)  Comment: used letrozole for last conception   Plan: medroxyPROGESTERone (PROVERA) 10 MG tablet,         letrozole (FEMARA) 2.5 MG tablet, US Pelvis         Complete w Transvaginal Follicular Init        Discussed trying Letrozole for ovulation induction.  Not FDA approved, but limited studies showing pregnancy rate higher than clomid evin. When clomid has failed in PCOS patients.  Aromatase inhibitors are generally well tolerated. The main side effects are hot flushes and gastrointestinal events (nausea and vomiting), headache, back pain, and leg cramps. Patient would like to try Letrozol 5mg days 3-7 of cycle.  Told to take it at same time each night and will call with menses to schedule follicle check about day 12-14 of cycle.  Start LH testing day 11.  Questions answered.  Will call/email with side effects or questions. HCG bump was also discussed.     Will do provera withdrawal after neg UPT and then stat letrozole.     Laxmi Elizabeth MD

## 2022-03-14 ENCOUNTER — TELEPHONE (OUTPATIENT)
Dept: OBGYN | Facility: CLINIC | Age: 32
End: 2022-03-14

## 2022-03-14 ENCOUNTER — ALLIED HEALTH/NURSE VISIT (OUTPATIENT)
Dept: NURSING | Facility: CLINIC | Age: 32
End: 2022-03-14
Attending: OBSTETRICS & GYNECOLOGY
Payer: COMMERCIAL

## 2022-03-14 ENCOUNTER — ANCILLARY PROCEDURE (OUTPATIENT)
Dept: ULTRASOUND IMAGING | Facility: CLINIC | Age: 32
End: 2022-03-14
Attending: OBSTETRICS & GYNECOLOGY
Payer: COMMERCIAL

## 2022-03-14 DIAGNOSIS — N92.6 IRREGULAR MENSES: ICD-10-CM

## 2022-03-14 PROCEDURE — 96372 THER/PROPH/DIAG INJ SC/IM: CPT | Performed by: OBSTETRICS & GYNECOLOGY

## 2022-03-14 PROCEDURE — 76830 TRANSVAGINAL US NON-OB: CPT | Performed by: OBSTETRICS & GYNECOLOGY

## 2022-03-14 PROCEDURE — 76856 US EXAM PELVIC COMPLETE: CPT | Performed by: OBSTETRICS & GYNECOLOGY

## 2022-03-14 RX ORDER — CHORIONIC GONADOTROPIN 10000 UNIT
10000 KIT INTRAMUSCULAR ONCE
Status: COMPLETED | OUTPATIENT
Start: 2022-03-14 | End: 2022-03-14

## 2022-03-14 RX ORDER — PROGESTERONE 200 MG/1
200 CAPSULE ORAL DAILY
Qty: 15 CAPSULE | Refills: 1 | Status: SHIPPED | OUTPATIENT
Start: 2022-03-14 | End: 2022-03-29

## 2022-03-14 RX ADMIN — CHORIONIC GONADOTROPIN 10000 UNITS: KIT at 10:05

## 2022-03-14 NOTE — TELEPHONE ENCOUNTER
Will route to covering provider in Laxmi Elizabeth MD absence.   Order HCG trigger per SO, RN unable to order. Medication pended for signature.   Eleanor FARNSWORTH RN

## 2022-03-14 NOTE — PROGRESS NOTES
If we do HCG bump, recommend nightly prometrium 200 mg by mouth to support luteal phase.  If no menses 15 days from HCG shot, do pregnancy test and call/email if positive.  If you are pregnant we will continue prometrium until about 10 weeks along.   We will also schedule an early ultrasound for about 7-8 weeks.   DO NOT do pregnancy test before 2 weeks after the HCG shot as it will often be a false positive.    Ordered prometrium per provider recommendation.   Will pend HCG medication for MD sig, will administer in MAR once completed.   IM admin in R vg.     If pregnancy test is negative, then stop prometrium.     Pt here for hcg trigger injection. Has not noted positive ovulation. Discussed timing of intercourse, starting progesterone if provider advised, delaying pregnancy test for 2 weeks, and requesting an e-visit with Dr. Elizabeth prior to the end of the cycle. Pt understands all instructions and will call if she has any questions/concerns.   Eleanor FREITAS RN

## 2022-03-30 ENCOUNTER — E-VISIT (OUTPATIENT)
Dept: OBGYN | Facility: CLINIC | Age: 32
End: 2022-03-30
Payer: COMMERCIAL

## 2022-03-30 DIAGNOSIS — N92.6 IRREGULAR MENSES: ICD-10-CM

## 2022-03-30 PROCEDURE — 99421 OL DIG E/M SVC 5-10 MIN: CPT | Performed by: OBSTETRICS & GYNECOLOGY

## 2022-03-31 RX ORDER — LETROZOLE 2.5 MG/1
TABLET, FILM COATED ORAL
Qty: 10 TABLET | Refills: 0 | Status: SHIPPED | OUTPATIENT
Start: 2022-03-31 | End: 2022-04-28

## 2022-04-06 ENCOUNTER — TELEPHONE (OUTPATIENT)
Dept: OBGYN | Facility: CLINIC | Age: 32
End: 2022-04-06
Payer: COMMERCIAL

## 2022-04-06 DIAGNOSIS — N92.6 IRREGULAR MENSES: Primary | ICD-10-CM

## 2022-04-06 RX ORDER — CHORIONIC GONADOTROPIN 10000 UNIT
10000 KIT INTRAMUSCULAR ONCE
Status: COMPLETED | OUTPATIENT
Start: 2022-04-12 | End: 2022-04-12

## 2022-04-12 ENCOUNTER — ANCILLARY PROCEDURE (OUTPATIENT)
Dept: ULTRASOUND IMAGING | Facility: CLINIC | Age: 32
End: 2022-04-12
Attending: OBSTETRICS & GYNECOLOGY
Payer: COMMERCIAL

## 2022-04-12 ENCOUNTER — ALLIED HEALTH/NURSE VISIT (OUTPATIENT)
Dept: NURSING | Facility: CLINIC | Age: 32
End: 2022-04-12
Payer: COMMERCIAL

## 2022-04-12 DIAGNOSIS — N92.6 IRREGULAR MENSES: Primary | ICD-10-CM

## 2022-04-12 DIAGNOSIS — N92.6 IRREGULAR MENSES: ICD-10-CM

## 2022-04-12 PROCEDURE — 96372 THER/PROPH/DIAG INJ SC/IM: CPT | Performed by: OBSTETRICS & GYNECOLOGY

## 2022-04-12 PROCEDURE — 76830 TRANSVAGINAL US NON-OB: CPT | Performed by: OBSTETRICS & GYNECOLOGY

## 2022-04-12 RX ORDER — PROGESTERONE 200 MG/1
200 CAPSULE ORAL DAILY
Qty: 30 CAPSULE | Refills: 1 | Status: SHIPPED | OUTPATIENT
Start: 2022-04-12 | End: 2022-06-23

## 2022-04-12 RX ADMIN — CHORIONIC GONADOTROPIN 10000 UNITS: KIT at 12:54

## 2022-04-12 NOTE — PROGRESS NOTES
Pt here for hcg trigger injection.  Has not noted positive ovulation.  Discussed timing of intercourse, starting progesterone, delaying pregnancy test for 2 weeks, and requesting an e-visit with Dr. Elizabeth prior to the end of the cycle.  Pt understands all instructions and will call if she has any questions/concerns.  Mariela Waldrop RN

## 2022-04-12 NOTE — PROGRESS NOTES
Bhumika received HCG bump today, per BE plan was to take prometrium 200mg daily after bump. Ordered.  Mariela Waldrop RN

## 2022-04-27 ENCOUNTER — E-VISIT (OUTPATIENT)
Dept: OBGYN | Facility: CLINIC | Age: 32
End: 2022-04-27
Payer: COMMERCIAL

## 2022-04-27 DIAGNOSIS — N92.6 IRREGULAR MENSES: ICD-10-CM

## 2022-04-27 PROCEDURE — 99421 OL DIG E/M SVC 5-10 MIN: CPT | Performed by: OBSTETRICS & GYNECOLOGY

## 2022-04-28 RX ORDER — LETROZOLE 2.5 MG/1
TABLET, FILM COATED ORAL
Qty: 10 TABLET | Refills: 0 | Status: SHIPPED | OUTPATIENT
Start: 2022-04-28 | End: 2022-07-14

## 2022-05-03 ENCOUNTER — TELEPHONE (OUTPATIENT)
Dept: OBGYN | Facility: CLINIC | Age: 32
End: 2022-05-03
Payer: COMMERCIAL

## 2022-05-03 DIAGNOSIS — N92.6 IRREGULAR MENSES: Primary | ICD-10-CM

## 2022-05-03 NOTE — TELEPHONE ENCOUNTER
Bhumika ZENA Sunshine  is scheduled for a follicle check on Tuesday, May 10 at 12:40 with a 1:30 nurse visit to follow. HCG Boost order needed.

## 2022-05-04 RX ORDER — CHORIONIC GONADOTROPIN 10000 UNIT
10000 KIT INTRAMUSCULAR ONCE
Status: COMPLETED | OUTPATIENT
Start: 2022-05-10 | End: 2022-05-10

## 2022-05-10 ENCOUNTER — ANCILLARY PROCEDURE (OUTPATIENT)
Dept: ULTRASOUND IMAGING | Facility: CLINIC | Age: 32
End: 2022-05-10
Attending: OBSTETRICS & GYNECOLOGY
Payer: COMMERCIAL

## 2022-05-10 ENCOUNTER — ALLIED HEALTH/NURSE VISIT (OUTPATIENT)
Dept: NURSING | Facility: CLINIC | Age: 32
End: 2022-05-10

## 2022-05-10 DIAGNOSIS — N92.6 IRREGULAR MENSES: ICD-10-CM

## 2022-05-10 DIAGNOSIS — N91.1 SECONDARY AMENORRHEA: Primary | ICD-10-CM

## 2022-05-10 PROCEDURE — 76830 TRANSVAGINAL US NON-OB: CPT | Performed by: OBSTETRICS & GYNECOLOGY

## 2022-05-10 PROCEDURE — 96372 THER/PROPH/DIAG INJ SC/IM: CPT | Performed by: OBSTETRICS & GYNECOLOGY

## 2022-05-10 RX ADMIN — CHORIONIC GONADOTROPIN 10000 UNITS: KIT at 13:02

## 2022-05-10 NOTE — PROGRESS NOTES
Pt here for hcg trigger injection. Has not noted positive ovulation. Discussed timing of intercourse, starting progesterone if provider advised, delaying pregnancy test for 2 weeks, and requesting an e-visit with Dr. Elizabeth prior to the end of the cycle. Pt understands all instructions and will call if she has any questions/concerns.   Eleanor FARNSWORTH RN

## 2022-06-07 ENCOUNTER — TRANSCRIBE ORDERS (OUTPATIENT)
Dept: MATERNAL FETAL MEDICINE | Facility: CLINIC | Age: 32
End: 2022-06-07

## 2022-06-07 ENCOUNTER — PRENATAL OFFICE VISIT (OUTPATIENT)
Dept: NURSING | Facility: CLINIC | Age: 32
End: 2022-06-07
Payer: COMMERCIAL

## 2022-06-07 VITALS — WEIGHT: 163 LBS | HEIGHT: 65 IN | BODY MASS INDEX: 27.16 KG/M2

## 2022-06-07 DIAGNOSIS — Z34.90 ENCOUNTER FOR SUPERVISION OF NORMAL PREGNANCY: Primary | ICD-10-CM

## 2022-06-07 DIAGNOSIS — O26.90 PREGNANCY RELATED CONDITION, ANTEPARTUM: Primary | ICD-10-CM

## 2022-06-07 DIAGNOSIS — Z23 NEED FOR TDAP VACCINATION: ICD-10-CM

## 2022-06-07 PROCEDURE — 99207 PR NO CHARGE NURSE ONLY: CPT

## 2022-06-07 NOTE — PROGRESS NOTES
Important Information for Provider:     New ob nurse intake by phone, second pregnancy, history of . Handouts reiviewed. Ordered genetic screening. Ultrasound scheduled for 2022 with Dr Quezada to call with results 2022. NOB with Dr Elizabeth 2022    Caffeine intake/servings daily - 0  Calcium intake/servings daily - 3  Exercise 5 times weekly - describe ; lifts, cardio, precautions given  Sunscreen used - Yes  Seatbelts used - Yes  Guns stored in the home - No  Self Breast Exam - Yes  Pap test up to date -  Yes  Eye exam up to date -  Yes  Dental exam up to date -  Yes  Immunizations reviewed and up to date - Yes  Abuse: Current or Past (Physical, Sexual or Emotional) - No  Do you feel safe in your environment - Yes  Do you cope well with stress - Yes  Do you suffer from insomnia - No  Prenatal OB Questionnaire  Patient supplied answers from flow sheet for:  Prenatal OB Questionnaire.  Past Medical History  Have you ever recieved care for your mental health? : No  Have you ever been in a major accident or suffered serious trauma?: No  Within the last year, has anyone hit, slapped, kicked or otherwise hurt you?: No  In the last year, has anyone forced you to have sex when you didn't want to?: No    Past Medical History 2   Have you ever received a blood transfusion?: No  Would you accept a blood transfusion if was medically recommended?: Yes  Does anyone in your home smoke?: No   Is your blood type Rh negative?: No  Have you ever ?: No  Have you been hospitalized for a nonsurgical reason excluding normal delivery?: No  Have you ever had an abnormal pap smear?: No    Past Medical History (Continued)  Do you have a history of abnormalities of the uterus?: No  Did your mother take BREANNE or any other hormones when she was pregnant with you?: No  Do you have any other problems we have not asked about which you feel may be important to this pregnancy?: No                     Allergies as of  6/7/2022:    Allergies as of 06/07/2022 - Reviewed 06/07/2022   Allergen Reaction Noted     Cats  04/07/2020       Current medications are:  Current Outpatient Medications   Medication Sig Dispense Refill     albuterol (PROAIR HFA/PROVENTIL HFA/VENTOLIN HFA) 108 (90 Base) MCG/ACT inhaler Inhale 1-2 puffs into the lungs every 6 hours as needed for shortness of breath / dyspnea or wheezing 8.5 g 1     letrozole (FEMARA) 2.5 MG tablet Take 2 tablets by mouth days 3-7 of cycle 10 tablet 0     Prenatal MV-Min-Fe Fum-FA-DHA (PRENATAL+DHA PO)        progesterone (PROMETRIUM) 200 MG capsule Take 1 capsule (200 mg) by mouth in the morning. 30 capsule 1         Early ultrasound screening tool:    Does patient have irregular periods?  No  Did patient use hormonal birth control in the three months prior to positive urine pregnancy test? No  Is the patient breastfeeding?  No  Is the patient 10 weeks or greater at time of education visit?  No

## 2022-06-22 ENCOUNTER — ANCILLARY PROCEDURE (OUTPATIENT)
Dept: ULTRASOUND IMAGING | Facility: CLINIC | Age: 32
End: 2022-06-22
Attending: OBSTETRICS & GYNECOLOGY
Payer: COMMERCIAL

## 2022-06-22 DIAGNOSIS — Z34.90 ENCOUNTER FOR SUPERVISION OF NORMAL PREGNANCY: ICD-10-CM

## 2022-06-22 PROCEDURE — 76817 TRANSVAGINAL US OBSTETRIC: CPT | Performed by: OBSTETRICS & GYNECOLOGY

## 2022-06-23 ENCOUNTER — VIRTUAL VISIT (OUTPATIENT)
Dept: OBGYN | Facility: CLINIC | Age: 32
End: 2022-06-23
Payer: COMMERCIAL

## 2022-06-23 DIAGNOSIS — O21.9 NAUSEA AND VOMITING IN PREGNANCY: ICD-10-CM

## 2022-06-23 DIAGNOSIS — Z34.90 EARLY STAGE OF PREGNANCY: Primary | ICD-10-CM

## 2022-06-23 PROCEDURE — 99207 PR NO BILLABLE SERVICE THIS VISIT: CPT | Performed by: OBSTETRICS & GYNECOLOGY

## 2022-06-23 RX ORDER — LANOLIN ALCOHOL/MO/W.PET/CERES
50 CREAM (GRAM) TOPICAL 2 TIMES DAILY
Qty: 120 TABLET | Refills: 1 | Status: SHIPPED | OUTPATIENT
Start: 2022-06-23 | End: 2022-08-22

## 2022-06-23 RX ORDER — PROGESTERONE 200 MG/1
200 CAPSULE ORAL DAILY
Qty: 14 CAPSULE | Refills: 0 | Status: SHIPPED | OUTPATIENT
Start: 2022-06-23 | End: 2022-07-14

## 2022-06-23 NOTE — PROGRESS NOTES
Bhumika is a 32 year old who is being evaluated via a billable telephone visit.      What phone number would you like to be contacted at? [phone]  How would you like to obtain your AVS? MyChart    OB Progress Note     CC: F/U ultrasound for dating and viability     HPI: Bhumika Gonsalez is a 32 year old  at 8w1d by LMP who presents today for a dating and viability ultrasound. Since her LMP she reports that she has been having nausea and vomiting which is different from her prior pregnancy, she has tried small meals and crackers but hasn't tried anything else yet. . She denies vaginal bleeding or abdominal pain. She denies any concerns today but was told by Dr. Soliman that she should continue progesterone through 10 weeks gestation and needs a refill.     O: LMP 2022      Ultrasound     Obstetrical Ultrasound Report  OB U/S  < 14 Weeks -  Transvaginal   [facility]  Referring Provider: Laxmi Elizabeth MD   Sonographer: Kezia Pacheco RDMS  Indication:  Viability check      Dating (mm/dd/yyyy):   LMP: 2022            EDC:  2023            GA by LMP:  8w0d     Current Scan On:  2022          EDC:  2023            GA by Current Scan:  7w4d  The calculation of the gestational age by current scan was based on CRL.  Anatomy Scan:  Willis gestation.  Biometry:  CRL                       1.35 cm                7w4d                      Yolk Sac                              0.2 cm                                                     Fetal heart activity: 167 bpm  Findings: Single viable IUP     Maternal Structures:  Cervix: The cervix appears long and closed.  Right Ovary: Wnl  Left Ovary: Complex cyst 1.3 x 1.3 x 1.5 cm     Impression:      Early willis IUP with yolk sac and cardiac activity, measures 7w 4d by today's ultrasound, EDC remains 23.  Left corpus luteum cyst     Nora Restrepo MD         Assessment and plan:   Bhumika FREITAS  Sunshine is a 32 year old  at 8w1d by LMP who presents for a dating and viability ultrasound   -We reviewed her ultrasound results which documented a willis IUP with fetal cardiac activity. Her ultrasound dating is consistent with her LMP and her Estimated Date of Delivery: 2023   -We discussed Unisom and Vit B6 for nausea/vomtiing in pregnancy   -She is scheduled for her initial prenatal visit and with genetic counseling   -Warning signs of SAB and when to call/contact the clinic reviewed     Dispo: RTC in 2-3 weeks for initial prenatal visit or sooner PEDRO Quezada MD    Phone call duration: 8 minutes

## 2022-06-23 NOTE — PROGRESS NOTES
"Bhumika is a 32 year old who is being evaluated via a billable telephone visit.      What phone number would you like to be contacted at? [phone]  How would you like to obtain your AVS? {AVS Preference:098000}    {PROVIDER CHARTING PREFERENCE:058575}    Subjective   Bhumika is a 32 year old{ACCOMPANIED BY STATEMENT (Optional):598470}, presenting for the following health issues:  Consult (Review ultrasound results)      HPI     {SUPERLIST (Optional):301246}  {additonal problems for provider to add (Optional):776455}    Review of Systems   {ROS COMP (Optional):745459}      Objective           Vitals:  No vitals were obtained today due to virtual visit.    Physical Exam   {GENERAL APPEARANCE:50::\"healthy\",\"alert\",\"no distress\"}  PSYCH: Alert and oriented times 3; coherent speech, normal   rate and volume, able to articulate logical thoughts, able   to abstract reason, no tangential thoughts, no hallucinations   or delusions  Her affect is { :1942713::\"normal\"}  RESP: No cough, no audible wheezing, able to talk in full sentences  Remainder of exam unable to be completed due to telephone visits    {Diagnostic Test Results (Optional):809242}    {AMBULATORY ATTESTATION (Optional):637116}        Phone call duration: *** minutes    .  ..    "

## 2022-06-28 ENCOUNTER — MYC MEDICAL ADVICE (OUTPATIENT)
Dept: OBGYN | Facility: CLINIC | Age: 32
End: 2022-06-28

## 2022-06-28 NOTE — LETTER
[facility] [address] [place] [address]  [phone]      June 29, 2022      Bhumika Gonsalez  [address] [place] [address]              To Whom It May Concern:    Bhumika Gonsalez is being seen in our clinic for prenatal care.  Her Estimated Date of Delivery: Feb 1, 2023.  Patient's last menstrual period was 04/27/2022..      Sincerely,    Laxmi Elizabeth MD

## 2022-07-12 ENCOUNTER — PRE VISIT (OUTPATIENT)
Dept: MATERNAL FETAL MEDICINE | Facility: CLINIC | Age: 32
End: 2022-07-12

## 2022-07-14 ENCOUNTER — PRENATAL OFFICE VISIT (OUTPATIENT)
Dept: OBGYN | Facility: CLINIC | Age: 32
End: 2022-07-14
Payer: COMMERCIAL

## 2022-07-14 VITALS
BODY MASS INDEX: 26.76 KG/M2 | DIASTOLIC BLOOD PRESSURE: 73 MMHG | WEIGHT: 160.8 LBS | TEMPERATURE: 97.8 F | SYSTOLIC BLOOD PRESSURE: 123 MMHG | HEART RATE: 84 BPM

## 2022-07-14 DIAGNOSIS — Z34.80 SUPERVISION OF OTHER NORMAL PREGNANCY, ANTEPARTUM: Primary | ICD-10-CM

## 2022-07-14 DIAGNOSIS — E55.9 VITAMIN D DEFICIENCY, UNSPECIFIED: ICD-10-CM

## 2022-07-14 LAB
ABO/RH(D): NORMAL
ALBUMIN UR-MCNC: NEGATIVE MG/DL
ANTIBODY SCREEN: NEGATIVE
APPEARANCE UR: CLEAR
BILIRUB UR QL STRIP: NEGATIVE
COLOR UR AUTO: YELLOW
ERYTHROCYTE [DISTWIDTH] IN BLOOD BY AUTOMATED COUNT: 13.8 % (ref 10–15)
GLUCOSE UR STRIP-MCNC: NEGATIVE MG/DL
HCT VFR BLD AUTO: 37.9 % (ref 35–47)
HGB BLD-MCNC: 12.5 G/DL (ref 11.7–15.7)
HGB UR QL STRIP: ABNORMAL
KETONES UR STRIP-MCNC: NEGATIVE MG/DL
LEUKOCYTE ESTERASE UR QL STRIP: NEGATIVE
MCH RBC QN AUTO: 28.7 PG (ref 26.5–33)
MCHC RBC AUTO-ENTMCNC: 33 G/DL (ref 31.5–36.5)
MCV RBC AUTO: 87 FL (ref 78–100)
NITRATE UR QL: NEGATIVE
PH UR STRIP: 6 [PH] (ref 5–7)
PLATELET # BLD AUTO: 324 10E3/UL (ref 150–450)
RBC # BLD AUTO: 4.35 10E6/UL (ref 3.8–5.2)
SP GR UR STRIP: 1.01 (ref 1–1.03)
SPECIMEN EXPIRATION DATE: NORMAL
UROBILINOGEN UR STRIP-ACNC: 0.2 E.U./DL
WBC # BLD AUTO: 8.8 10E3/UL (ref 4–11)

## 2022-07-14 PROCEDURE — 86780 TREPONEMA PALLIDUM: CPT | Performed by: OBSTETRICS & GYNECOLOGY

## 2022-07-14 PROCEDURE — 85027 COMPLETE CBC AUTOMATED: CPT | Performed by: OBSTETRICS & GYNECOLOGY

## 2022-07-14 PROCEDURE — 36415 COLL VENOUS BLD VENIPUNCTURE: CPT | Performed by: OBSTETRICS & GYNECOLOGY

## 2022-07-14 PROCEDURE — 87340 HEPATITIS B SURFACE AG IA: CPT | Performed by: OBSTETRICS & GYNECOLOGY

## 2022-07-14 PROCEDURE — 81003 URINALYSIS AUTO W/O SCOPE: CPT | Performed by: OBSTETRICS & GYNECOLOGY

## 2022-07-14 PROCEDURE — 99207 PR FIRST OB VISIT: CPT | Performed by: OBSTETRICS & GYNECOLOGY

## 2022-07-14 PROCEDURE — 86900 BLOOD TYPING SEROLOGIC ABO: CPT | Performed by: OBSTETRICS & GYNECOLOGY

## 2022-07-14 PROCEDURE — 82306 VITAMIN D 25 HYDROXY: CPT | Performed by: OBSTETRICS & GYNECOLOGY

## 2022-07-14 PROCEDURE — 86901 BLOOD TYPING SEROLOGIC RH(D): CPT | Performed by: OBSTETRICS & GYNECOLOGY

## 2022-07-14 PROCEDURE — 86762 RUBELLA ANTIBODY: CPT | Performed by: OBSTETRICS & GYNECOLOGY

## 2022-07-14 PROCEDURE — 87086 URINE CULTURE/COLONY COUNT: CPT | Performed by: OBSTETRICS & GYNECOLOGY

## 2022-07-14 PROCEDURE — 86803 HEPATITIS C AB TEST: CPT | Performed by: OBSTETRICS & GYNECOLOGY

## 2022-07-14 PROCEDURE — 87389 HIV-1 AG W/HIV-1&-2 AB AG IA: CPT | Performed by: OBSTETRICS & GYNECOLOGY

## 2022-07-14 PROCEDURE — 86850 RBC ANTIBODY SCREEN: CPT | Performed by: OBSTETRICS & GYNECOLOGY

## 2022-07-14 NOTE — PROGRESS NOTES
Last pregnancy uncomplicated but PROM at 36 weeks with delivery .  Has first tri screen scheduled . Having nausea but seems to be improving. conceived letrozole 3rd cycle and dates by LMP c/w 7 wk u/s.  Providers/residents, weight gain/exercise, delivery discussed. RTC 4 weeks and AFP then. BE    HPI:  Bhumika Gonsalez is a 32 year old female Patient's last menstrual period was 2022. at 11w1d, Estimated Date of Delivery: 2023.  She denies vaginal bleeding and abdominal pain.    No other c/o.    Past Medical History:   Diagnosis Date     Alopecia universalis      Asthma     cats and humidity        Past Surgical History:   Procedure Laterality Date     DENTAL SURGERY      wisdom teeth       Allergies: Cats     EXAM:  Blood pressure 123/73, pulse 84, temperature 97.8  F (36.6  C), temperature source Oral, weight 72.9 kg (160 lb 12.8 oz), last menstrual period 2022, not currently breastfeeding.   BMI= Body mass index is 26.76 kg/m .  General - pleasant female in no acute distress.  Neck - supple without lymphadenopathy or thyromegaly.  Lungs - clear to auscultation bilaterally.  Heart - regular rate and rhythm without murmur.  Breast - no nodularity, asymmetry or nipple discharge bilaterally.  Abdomen - soft, nontender, nondistended, gravid, consistant with dates, mobile, Adnexae: no masses or tenderness.  Rectovaginal - deferred.  Musculoskeletal - no gross deformities.  Neurological - normal strength, sensation, and mental status.    Doptones were 168  BSUS with willis IUP with cardiac activity and fetal motion measuring consistent with dates    ASSESSMENT/PLAN:  (Z34.80) Supervision of other normal pregnancy, antepartum  (primary encounter diagnosis)  Comment: First trimester screen scheduled  Plan: US OB > 14 Weeks        Plan AFP with next visit and then anatomy ultrasound following this.  Patient is aware of my sabbatical    (E55.9) Vitamin D deficiency, unspecified  Comment: Low  in the past  Plan: Vitamin D Deficiency        Check with labs today    Weight gain and exercise during pregnancy was discussed at today's visit.  The patient will return to clinic in 4 weeks for continued prenatal care.

## 2022-07-15 LAB
DEPRECATED CALCIDIOL+CALCIFEROL SERPL-MC: 30 UG/L (ref 20–75)
HBV SURFACE AG SERPL QL IA: NONREACTIVE
HCV AB SERPL QL IA: NONREACTIVE
HIV 1+2 AB+HIV1 P24 AG SERPL QL IA: NONREACTIVE
RUBV IGG SERPL QL IA: 2.28 INDEX
RUBV IGG SERPL QL IA: POSITIVE
T PALLIDUM AB SER QL: NONREACTIVE

## 2022-07-16 ENCOUNTER — MYC MEDICAL ADVICE (OUTPATIENT)
Dept: OBGYN | Facility: CLINIC | Age: 32
End: 2022-07-16

## 2022-07-16 DIAGNOSIS — R11.0 NAUSEA: Primary | ICD-10-CM

## 2022-07-16 LAB — BACTERIA UR CULT: NORMAL

## 2022-07-18 RX ORDER — ONDANSETRON 4 MG/1
4 TABLET, FILM COATED ORAL EVERY 8 HOURS PRN
Qty: 60 TABLET | Refills: 1 | Status: SHIPPED | OUTPATIENT
Start: 2022-07-18 | End: 2023-03-31

## 2022-07-18 NOTE — TELEPHONE ENCOUNTER
Bhumika requesting antiemetic medication. Zofran cued up. Patient past 10w gestation. Please route to RN so patient can be notified Rx was sent.  Mariela Waldrop RN

## 2022-07-19 ENCOUNTER — LAB (OUTPATIENT)
Dept: LAB | Facility: CLINIC | Age: 32
End: 2022-07-19
Attending: OBSTETRICS & GYNECOLOGY
Payer: COMMERCIAL

## 2022-07-19 ENCOUNTER — HOSPITAL ENCOUNTER (OUTPATIENT)
Dept: ULTRASOUND IMAGING | Facility: CLINIC | Age: 32
Discharge: HOME | End: 2022-07-19
Attending: OBSTETRICS & GYNECOLOGY
Payer: COMMERCIAL

## 2022-07-19 ENCOUNTER — OFFICE VISIT (OUTPATIENT)
Dept: MATERNAL FETAL MEDICINE | Facility: CLINIC | Age: 32
End: 2022-07-19
Attending: OBSTETRICS & GYNECOLOGY
Payer: COMMERCIAL

## 2022-07-19 DIAGNOSIS — Z34.91 SUPERVISION OF LOW-RISK PREGNANCY, FIRST TRIMESTER: ICD-10-CM

## 2022-07-19 DIAGNOSIS — Z84.81 FAMILY HISTORY OF CARRIER OF GENETIC DISEASE: ICD-10-CM

## 2022-07-19 DIAGNOSIS — Z82.0 FAMILY HISTORY OF MYASTHENIA GRAVIS: ICD-10-CM

## 2022-07-19 DIAGNOSIS — O26.90 PREGNANCY RELATED CONDITION, ANTEPARTUM: Primary | ICD-10-CM

## 2022-07-19 DIAGNOSIS — Z36.9 ANTENATAL SCREENING ENCOUNTER: ICD-10-CM

## 2022-07-19 DIAGNOSIS — Z36.9 ANTENATAL SCREENING ENCOUNTER: Primary | ICD-10-CM

## 2022-07-19 DIAGNOSIS — O26.90 PREGNANCY RELATED CONDITION, ANTEPARTUM: ICD-10-CM

## 2022-07-19 PROCEDURE — 76813 OB US NUCHAL MEAS 1 GEST: CPT | Mod: 26 | Performed by: OBSTETRICS & GYNECOLOGY

## 2022-07-19 PROCEDURE — 36415 COLL VENOUS BLD VENIPUNCTURE: CPT

## 2022-07-19 PROCEDURE — 96040 HC GENETIC COUNSELING, EACH 30 MINUTES: CPT | Performed by: GENETIC COUNSELOR, MS

## 2022-07-19 PROCEDURE — 76813 OB US NUCHAL MEAS 1 GEST: CPT

## 2022-07-19 NOTE — PROGRESS NOTES
[facility] [place]  Genetic Counseling Consult    Patient: Bhumika Gonsalez YOB: 1990   Date of Service: 22      Bhumika Gonsalez was seen at [facility] for genetic consultation to discuss the options for routine screening for fetal chromosome abnormalities. Bhumika was accompanied to today's consult by her , Rashad.      Impression/Plan:   1.  Bhumika elected to proceed with NIPS through [facility] and opted to screen for sex chromosome aneuploidies including fetal sex. Results are expected in 7-10 business days. Bhumika requested a detailed message with results on her voicemail, including the fetal sex information. Patient was informed that results, including fetal sex,  will be available via [employer].    2. Maternal serum AFP (single marker screen) is recommended after 15 weeks to screen for open neural tube defects. A quad screen should not be performed.    3.  Bhumika had an NT ultrasound today. Please see the ultrasound report for additional details.     Pregnancy History:   /Parity:    Age at Delivery: 32 year old  ROBLES: 2023, by Last Menstrual Period  Gestational Age: 11w6d    No significant complications or exposures were reported in the current pregnancy.    Bhumika s pregnancy history is significant for one  36w2d delivery of a son due to PPROMin 2020. He required 1 week NICU stay but is otherwise doing well.     Medical History:   Bhumika mondragon reported medical history includes a history of alopecia.  We discussed that the genetics of alopecia are not well understood, but it may be seen to affect multiple family members due to complex/multifactorial inheritance.  We discussed that Bhumika's children may be at increased risk over the general population to have alopecia as well, but there is not specific genetic testing available to determine an individual's risks.        Family History:   A three-generation  pedigree was obtained in 2020 when the patient previously met with genetic counseling, and is scanned under the  Media  tab.   The following significant findings were reported by Bhumika in 2020:    Bhumika reports a maternal aunt with unknown cognitive impairment and birth defects attributed to pregnancy/delivery complications.  If this individual's health concerns are attributed to a specific event or injury, this history is not expected to impact risks for Bhumika's pregnancy.      Otherwise, the reported family history is negative for multiple miscarriages, stillbirths, birth defects, cognitive impairment, known genetic conditions, and consanguinity.    Today, Rashad reports his sister is a carrier of cystic fibrosis and is currently pregnant, expected to delivery a baby girl this calendar year.There is no known family history of cystic fibrosis in Rashad's family. Presently, Rashad has a 1 in 2 chance for being a carrier of the condition. Please see the carrier screening section below for more details. Additionally, Bhumika reports that her brother was recently diagnosed with myasthenia gravis (MG). We reviewed the presence of multiple autoimmune conditions in the family and the multifactorial inheritance pattern these conditions can follow. Because no specific genetic etiology is known for  MG or alopecia, genetic testing for autoimmune disorders in the current pregnancy is not available. Bhumika verbalized understanding.        Carrier Screening:   The patient reports that she and the father of the pregnancy have  ancestry:     Cystic fibrosis is an autosomal recessive genetic condition that occurs with increased frequency in individuals of  ancestry and carrier screening for this condition is available.  In addition,  screening in the [facility] includes cystic fibrosis.     The patient reports that she and the father of the pregnancy have  ancestry:      The hemoglobinopathies are a group  of genetic blood diseases that occur with increased frequency in individuals of  ancestry and carrier screening for these conditions is available.  Carrier screening for the hemoglobinopathies includes a CBC with red blood cell indices, a ferritin level, and a quantitative hemoglobin electrophoresis or HPLC.  In addition,  screening in the [facility] includes many of the hemoglobinopathies.       Expanded carrier screening for mutations in a large panel of genes associated with autosomal recessive conditions including cystic fibrosis, spinal muscular atrophy, and others, is now available.       The patient declined the carrier screening options reviewed with her in . Today, we reviewed the option of expanded carrier screening in light of Rashad's new family history information:     Comprehensive carrier screening for mutations in a large panel of genes associated with autosomal recessive conditions including cystic fibrosis, Thalassemias, hearing loss, spinal muscular atrophy, and others, is now available. We also reviewed that comprehensive carrier screening can assess for common X-linked recessive disorders such as Fragile X syndrome.     We discussed that comprehensive carrier screening is designed to identify carrier status for conditions that are primarily childhood or adolescent onset. Comprehensive carrier screening does not evaluate for adult-onset conditions such as hereditary cancer syndromes, dementia/ Alzheimer's disease, or cardiovascular disease risk factors. Additionally, comprehensive carrier screening is not comprehensive for all known genetic diseases or inherited conditions. It does not screen for autosomal dominant hereditary conditions. This is a screening test, and residual carrier status risk figures will be provided to the patient after results become available. Carrier screening is not meant to diagnose the patient with a condition, and generally carriers are  asymptomatic. However, certain genes may confer increased risks for various health concerns in carriers (for example, but not limited to: BEBA, FMR1, DMD, etc). Patients are encouraged to share results with their primary care providers to ensure appropriate screening. If we are notified by the performing laboratory of a variant reclassification, the patient will be contacted.    We reviewed availability of comprehensive carrier screening through [facility] for up to 289 conditions. [facility] can also screen for fewer conditions via a variety of panels. [facility] laboratory will report on pseudodeficiency alleles, which are benign variants that are not known to be associated with disease and are not thought to impact the individuals risk to be a carrier for these conditions.  However, the presence of pseudodeficiency alleles can exhibit false positive results on biochemical tests such as  screen. This will be addressed with the patient should a pseudodeficiency allele be identified. Additionaly, [facility] laboratory will report the common 5T CFTR allele in isolation.     We discussed that coverage for carrier screening can be variable by insurance companies. We reviewed the self-pay option that will be available to the patient and her partner ($250 for one test, $100 for partner testing) should insurance not cover the cost of testing, or, if with coverage, cost of testing is greater than $350 combined.     Today, Rashad and Bhumika decline proceed with expanded carrier screening as well as screening for just CFTR pathogenic variants. They plan to proceed with the [place]  screen after the birth of this current pregnancy, and go from there.        Risk Assessment for Chromosome Conditions:   We explained that the risk for fetal chromosome abnormalities increases with maternal age. We discussed specific features of common chromosome abnormalities, including Down syndrome, trisomy 13, trisomy 18, and sex chromosome  trisomies.      - At age 32 at midtrimester, the risk to have a baby with Down syndrome is 1 in 690.     - At age 32 at midtrimester, the risk to have a baby with any chromosome abnormality is 1 in 345.     - At age 32 at delivery, the risk to have a baby with Down syndrome is 1 in 952.    - At age 32 at delivery, the risk to have a baby with any chromosome abnormality is 1 in 384.        Testing Options:   We discussed the following options:   First trimester screening    First trimester ultrasound with nuchal translucency and nasal bone assessments, maternal plasma hCG, OSORIO-A, and AFP measurement    Screens for fetal trisomy 21, trisomy 13, and trisomy 18    Cannot screen for open neural tube defects; maternal serum AFP after 15 weeks is recommended  This test has primarily been validated in woman at increased risk for a chromosome problem in a pregnancy (e.g. abnormal screening results, abnormal ultrasound findings, advanced maternal age). In addition, we reviewed that many insurance companies may not cover this test in women who are at low risk to have a baby with a chromosome problem. Some low risk women do still choose this option. In this situation, they are aware that insurance may not cover the cost of testing. The benefits and limitations of this screen in the context of a low risk pregnancy were discussed. The patient reports she is comfortable with proceeding with NIPT analysis at this time.       Non-invasive Prenatal Testing (NIPT)    Maternal plasma cell-free DNA testing; first trimester ultrasound with nuchal translucency and nasal bone assessment is recommended, when appropriate    Screens for fetal trisomy 21, trisomy 13, trisomy 18, and sex chromosome aneuploidy    Cannot screen for open neural tube defects; maternal serum AFP after 15 weeks is recommended       Chorionic villus sampling (CVS)    Invasive procedure typically performed in the first trimester by which placental villi are obtained  for the purpose of chromosome analysis and/or other prenatal genetic analysis    Diagnostic results; >99% sensitivity for fetal chromosome abnormalities    Cannot test for open neural tube defects; maternal serum AFP after 15 weeks is recommended       Genetic Amniocentesis    Invasive procedure typically performed in the second trimester by which amniotic fluid is obtained for the purpose of chromosome analysis and/or other prenatal genetic analysis    Diagnostic results; >99% sensitivity for fetal chromosome abnormalities    AFAFP measurement tests for open neural tube defects       Comprehensive (Level II) ultrasound: Detailed ultrasound performed between 18-22 weeks gestation to screen for major birth defects and markers for aneuploidy.      NT Ultrasound     Assessment for the thickness of the nuchal translucency and the fetus' nasal bone performed between 65c1x-58z9l gestation    ~70% aneuploidy detection      We reviewed the benefits and limitations of this testing.  Screening tests provide a risk assessment specific to the pregnancy for certain fetal chromosome abnormalities, but cannot definitively diagnose or exclude a fetal chromosome abnormality.  Follow-up genetic counseling and consideration of diagnostic testing is recommended with any abnormal screening result.      It was a pleasure to be involved with Bhumika mondragon [facility]. Face-to-face time of the meeting was 40   minutes.      Citlali White MS, [facility]  Genetic Counselor  [facility]  Maternal Fetal Medicine  Ph: [phone]   Pager: [phone]

## 2022-07-19 NOTE — PROGRESS NOTES
The patient was seen for an ultrasound in the M[facility]etal Medicine Center at the [facility] today.  For a detailed report of the ultrasound examination, please see the ultrasound report which can be found under the imaging tab.    Senia Rubin MD  , OB/GYN  Maternal-Fetal Medicine  [phone] (Pager)

## 2022-07-28 ENCOUNTER — TELEPHONE (OUTPATIENT)
Dept: MATERNAL FETAL MEDICINE | Facility: CLINIC | Age: 32
End: 2022-07-28

## 2022-07-28 LAB — SCANNED LAB RESULT: NORMAL

## 2022-07-28 NOTE — TELEPHONE ENCOUNTER
July 28, 2022    Left a message for Bhumika regarding her NIPT results.     [facility] NIPS screening results indicate NO ANEUPLOIDY DETECTED for chromosomes 21, 18, 13, or sex chromosomes (XX). Per the patient's request during our initial genetic counseling consult, the predicted genetic sex of the pregnancy was shared on voicemail today (female).    These results put the patient's current pregnancy at low risk for Down syndrome, trisomy 18, trisomy 13 and sex chromosome abnormalities.This test is reported to have the following sensitivities: Down syndrome- 99.99%, trisomy 18- 99.99%, trisomy 13- 99.99%, XX sex chromosomes- 98.33%, XY sex chromosomes- 99.99%.  Although these results are reassuring, this does not replace a standard chromosome analysis from a chorionic villus sampling or amniocentesis.     MSAFP is the appropriate second trimester screening test for open neural tube defects; the maternal quad screen is not recommended. Her results are available in her Epic chart for review and will be forwarded to her primary OB.       Citlali White MS, formerly [facility]  Genetic Counselor  [facility]  Maternal Fetal Medicine  Ph: [phone]

## 2022-08-09 ENCOUNTER — PRENATAL OFFICE VISIT (OUTPATIENT)
Dept: OBGYN | Facility: CLINIC | Age: 32
End: 2022-08-09
Payer: COMMERCIAL

## 2022-08-09 VITALS
OXYGEN SATURATION: 98 % | BODY MASS INDEX: 26.79 KG/M2 | WEIGHT: 161 LBS | HEART RATE: 82 BPM | SYSTOLIC BLOOD PRESSURE: 116 MMHG | DIASTOLIC BLOOD PRESSURE: 61 MMHG

## 2022-08-09 DIAGNOSIS — Z34.80 SUPERVISION OF OTHER NORMAL PREGNANCY, ANTEPARTUM: Primary | ICD-10-CM

## 2022-08-09 PROCEDURE — 99000 SPECIMEN HANDLING OFFICE-LAB: CPT | Performed by: OBSTETRICS & GYNECOLOGY

## 2022-08-09 PROCEDURE — 82105 ALPHA-FETOPROTEIN SERUM: CPT | Mod: 90 | Performed by: OBSTETRICS & GYNECOLOGY

## 2022-08-09 PROCEDURE — 36415 COLL VENOUS BLD VENIPUNCTURE: CPT | Performed by: OBSTETRICS & GYNECOLOGY

## 2022-08-09 PROCEDURE — 99207 PR PRENATAL VISIT: CPT | Performed by: OBSTETRICS & GYNECOLOGY

## 2022-08-09 NOTE — PROGRESS NOTES
Doing well.   Starting to feel better nausea wise.   AFP today.   Fetal survey ordered.   RTC 4 weeks

## 2022-08-11 LAB
# FETUSES US: NORMAL
AFP MOM SERPL: 0.66
AFP SERPL-MCNC: 18 NG/ML
AGE - REPORTED: 30.9 YR
CURRENT SMOKER: NO
FAMILY MEMBER DISEASES HX: NO
GA METHOD: NORMAL
GA: NORMAL WK
IDDM PATIENT QL: NO
INTEGRATED SCN PATIENT-IMP: NORMAL
SPECIMEN DRAWN SERPL: NORMAL

## 2022-09-16 ENCOUNTER — PRENATAL OFFICE VISIT (OUTPATIENT)
Dept: OBGYN | Facility: CLINIC | Age: 32
End: 2022-09-16
Payer: COMMERCIAL

## 2022-09-16 ENCOUNTER — ANCILLARY PROCEDURE (OUTPATIENT)
Dept: ULTRASOUND IMAGING | Facility: CLINIC | Age: 32
End: 2022-09-16
Attending: OBSTETRICS & GYNECOLOGY
Payer: COMMERCIAL

## 2022-09-16 VITALS
BODY MASS INDEX: 27.61 KG/M2 | WEIGHT: 165.7 LBS | HEIGHT: 65 IN | SYSTOLIC BLOOD PRESSURE: 96 MMHG | OXYGEN SATURATION: 100 % | DIASTOLIC BLOOD PRESSURE: 61 MMHG | HEART RATE: 99 BPM

## 2022-09-16 DIAGNOSIS — Z23 HIGH PRIORITY FOR 2019-NCOV VACCINE: ICD-10-CM

## 2022-09-16 DIAGNOSIS — Z34.80 SUPERVISION OF OTHER NORMAL PREGNANCY, ANTEPARTUM: ICD-10-CM

## 2022-09-16 DIAGNOSIS — Z23 NEED FOR PROPHYLACTIC VACCINATION AND INOCULATION AGAINST INFLUENZA: ICD-10-CM

## 2022-09-16 DIAGNOSIS — Z34.80 SUPERVISION OF OTHER NORMAL PREGNANCY, ANTEPARTUM: Primary | ICD-10-CM

## 2022-09-16 PROCEDURE — 0134A COVID-19,PF,MODERNA BIVALENT: CPT | Performed by: OBSTETRICS & GYNECOLOGY

## 2022-09-16 PROCEDURE — 90471 IMMUNIZATION ADMIN: CPT | Performed by: OBSTETRICS & GYNECOLOGY

## 2022-09-16 PROCEDURE — 99207 PR PRENATAL VISIT: CPT | Performed by: OBSTETRICS & GYNECOLOGY

## 2022-09-16 PROCEDURE — 76805 OB US >/= 14 WKS SNGL FETUS: CPT | Performed by: OBSTETRICS & GYNECOLOGY

## 2022-09-16 PROCEDURE — 91313 COVID-19,PF,MODERNA BIVALENT: CPT | Performed by: OBSTETRICS & GYNECOLOGY

## 2022-09-16 PROCEDURE — 90686 IIV4 VACC NO PRSV 0.5 ML IM: CPT | Performed by: OBSTETRICS & GYNECOLOGY

## 2022-09-16 NOTE — PROGRESS NOTES
20w2d feeling good, no c/o.  Feeling mvmt now.  Had FAS, prelim normal. Flu shot and covid booster today. glucola next visit.  RTC 4 weeks  jlp

## 2022-10-10 ENCOUNTER — PRENATAL OFFICE VISIT (OUTPATIENT)
Dept: OBGYN | Facility: CLINIC | Age: 32
End: 2022-10-10
Payer: COMMERCIAL

## 2022-10-10 VITALS
OXYGEN SATURATION: 100 % | DIASTOLIC BLOOD PRESSURE: 68 MMHG | BODY MASS INDEX: 28.24 KG/M2 | SYSTOLIC BLOOD PRESSURE: 117 MMHG | HEART RATE: 83 BPM | WEIGHT: 169.7 LBS

## 2022-10-10 DIAGNOSIS — Z00.00 ROUTINE GENERAL MEDICAL EXAMINATION AT A HEALTH CARE FACILITY: ICD-10-CM

## 2022-10-10 DIAGNOSIS — Z78.9 BREASTFEEDING (INFANT): ICD-10-CM

## 2022-10-10 DIAGNOSIS — Z34.80 SUPERVISION OF OTHER NORMAL PREGNANCY, ANTEPARTUM: Primary | ICD-10-CM

## 2022-10-10 LAB
GLUCOSE 1H P 50 G GLC PO SERPL-MCNC: 125 MG/DL (ref 70–129)
HGB BLD-MCNC: 11.9 G/DL (ref 11.7–15.7)
HOLD SPECIMEN: NORMAL

## 2022-10-10 PROCEDURE — 36415 COLL VENOUS BLD VENIPUNCTURE: CPT | Performed by: OBSTETRICS & GYNECOLOGY

## 2022-10-10 PROCEDURE — 99207 PR PRENATAL VISIT: CPT | Performed by: OBSTETRICS & GYNECOLOGY

## 2022-10-10 PROCEDURE — 82950 GLUCOSE TEST: CPT | Performed by: OBSTETRICS & GYNECOLOGY

## 2022-10-10 RX ORDER — ALBUTEROL SULFATE 90 UG/1
1-2 AEROSOL, METERED RESPIRATORY (INHALATION) EVERY 6 HOURS PRN
Qty: 8.5 G | Refills: 1 | Status: SHIPPED | OUTPATIENT
Start: 2022-10-10 | End: 2024-02-08

## 2022-10-10 RX ORDER — BREAST PUMP
1 EACH MISCELLANEOUS CONTINUOUS PRN
Qty: 1 EACH | Refills: 0 | Status: SHIPPED | OUTPATIENT
Start: 2022-10-10 | End: 2024-02-08

## 2022-10-10 NOTE — PROGRESS NOTES
Patient states she is feeling well.  Some allergies for which she takes claritin.  She denies any contractions, vaginal bleeding,  Leaking fluid, headache, fever, chest pain, chest pressure, shortness of breath.  She does note an arm rash.  It appears most prominent on left lower arm.  A little erythema and excoriations visible.  Recommend vanicream and hydrocortisone as needed.    Peds- Lynne Jacob  Leave paperwork- will check with HR  Fd- breast, pump script given  Next visit 11/11/22.  Leydi Valencia MD

## 2022-11-17 ENCOUNTER — PRENATAL OFFICE VISIT (OUTPATIENT)
Dept: OBGYN | Facility: CLINIC | Age: 32
End: 2022-11-17
Payer: COMMERCIAL

## 2022-11-17 VITALS
BODY MASS INDEX: 29.37 KG/M2 | WEIGHT: 176.5 LBS | SYSTOLIC BLOOD PRESSURE: 119 MMHG | HEART RATE: 87 BPM | DIASTOLIC BLOOD PRESSURE: 67 MMHG | OXYGEN SATURATION: 100 %

## 2022-11-17 DIAGNOSIS — Z34.80 SUPERVISION OF OTHER NORMAL PREGNANCY, ANTEPARTUM: Primary | ICD-10-CM

## 2022-11-17 DIAGNOSIS — Z23 NEED FOR TDAP VACCINATION: ICD-10-CM

## 2022-11-17 PROCEDURE — 99207 PR PRENATAL VISIT: CPT | Performed by: OBSTETRICS & GYNECOLOGY

## 2022-11-17 PROCEDURE — 90715 TDAP VACCINE 7 YRS/> IM: CPT | Performed by: OBSTETRICS & GYNECOLOGY

## 2022-11-17 PROCEDURE — 90471 IMMUNIZATION ADMIN: CPT | Performed by: OBSTETRICS & GYNECOLOGY

## 2022-12-01 ENCOUNTER — PRENATAL OFFICE VISIT (OUTPATIENT)
Dept: OBGYN | Facility: CLINIC | Age: 32
End: 2022-12-01
Payer: COMMERCIAL

## 2022-12-01 VITALS
WEIGHT: 177 LBS | OXYGEN SATURATION: 97 % | HEART RATE: 97 BPM | SYSTOLIC BLOOD PRESSURE: 123 MMHG | BODY MASS INDEX: 29.45 KG/M2 | DIASTOLIC BLOOD PRESSURE: 72 MMHG

## 2022-12-01 DIAGNOSIS — Z34.80 SUPERVISION OF OTHER NORMAL PREGNANCY, ANTEPARTUM: Primary | ICD-10-CM

## 2022-12-01 PROCEDURE — 99207 PR PRENATAL VISIT: CPT | Performed by: OBSTETRICS & GYNECOLOGY

## 2022-12-01 ASSESSMENT — PATIENT HEALTH QUESTIONNAIRE - PHQ9: SUM OF ALL RESPONSES TO PHQ QUESTIONS 1-9: 4

## 2022-12-01 NOTE — PROGRESS NOTES
Having some discharge but no other c/o. Not feeling contractions.  No longer going to contreras at 36 wks. Plan GBS next visit due to early prior delivery. BE

## 2022-12-16 ENCOUNTER — PRENATAL OFFICE VISIT (OUTPATIENT)
Dept: OBGYN | Facility: CLINIC | Age: 32
End: 2022-12-16
Payer: COMMERCIAL

## 2022-12-16 VITALS
DIASTOLIC BLOOD PRESSURE: 65 MMHG | BODY MASS INDEX: 30.29 KG/M2 | WEIGHT: 182 LBS | OXYGEN SATURATION: 99 % | SYSTOLIC BLOOD PRESSURE: 122 MMHG | HEART RATE: 95 BPM

## 2022-12-16 DIAGNOSIS — K21.9 GASTROESOPHAGEAL REFLUX DISEASE WITHOUT ESOPHAGITIS: ICD-10-CM

## 2022-12-16 DIAGNOSIS — Z34.80 SUPERVISION OF OTHER NORMAL PREGNANCY, ANTEPARTUM: Primary | ICD-10-CM

## 2022-12-16 LAB
HGB BLD-MCNC: 11.4 G/DL (ref 11.7–15.7)
HOLD SPECIMEN: NORMAL

## 2022-12-16 PROCEDURE — 99207 PR PRENATAL VISIT: CPT | Performed by: OBSTETRICS & GYNECOLOGY

## 2022-12-16 PROCEDURE — 87653 STREP B DNA AMP PROBE: CPT | Performed by: OBSTETRICS & GYNECOLOGY

## 2022-12-16 PROCEDURE — 36415 COLL VENOUS BLD VENIPUNCTURE: CPT | Performed by: OBSTETRICS & GYNECOLOGY

## 2022-12-16 RX ORDER — OMEPRAZOLE 40 MG/1
CAPSULE, DELAYED RELEASE ORAL
Qty: 90 CAPSULE | OUTPATIENT
Start: 2022-12-16

## 2022-12-16 RX ORDER — OMEPRAZOLE 40 MG/1
40 CAPSULE, DELAYED RELEASE ORAL DAILY
Qty: 30 CAPSULE | Refills: 3 | Status: SHIPPED | OUTPATIENT
Start: 2022-12-16 | End: 2023-03-31

## 2022-12-16 NOTE — PROGRESS NOTES
GBS today since last baby was 36 weeks. Check hgb. C/o GERD and unable to sleep at night. recc wedge pillow or lazy boy chair, will start daily priolsec 40 mg too. Has appts. BE

## 2022-12-17 LAB — GP B STREP DNA SPEC QL NAA+PROBE: NEGATIVE

## 2022-12-18 ENCOUNTER — HEALTH MAINTENANCE LETTER (OUTPATIENT)
Age: 32
End: 2022-12-18

## 2022-12-28 ENCOUNTER — PRENATAL OFFICE VISIT (OUTPATIENT)
Dept: OBGYN | Facility: CLINIC | Age: 32
End: 2022-12-28
Payer: COMMERCIAL

## 2022-12-28 VITALS
HEART RATE: 92 BPM | SYSTOLIC BLOOD PRESSURE: 117 MMHG | WEIGHT: 186 LBS | DIASTOLIC BLOOD PRESSURE: 72 MMHG | OXYGEN SATURATION: 99 % | BODY MASS INDEX: 30.95 KG/M2

## 2022-12-28 DIAGNOSIS — Z34.80 SUPERVISION OF OTHER NORMAL PREGNANCY, ANTEPARTUM: Primary | ICD-10-CM

## 2022-12-28 PROCEDURE — 99207 PR PRENATAL VISIT: CPT | Performed by: OBSTETRICS & GYNECOLOGY

## 2022-12-28 RX ORDER — ONDANSETRON 2 MG/ML
4 INJECTION INTRAMUSCULAR; INTRAVENOUS EVERY 6 HOURS PRN
Status: CANCELLED | OUTPATIENT
Start: 2022-12-28

## 2022-12-28 RX ORDER — NALOXONE HYDROCHLORIDE 0.4 MG/ML
0.4 INJECTION, SOLUTION INTRAMUSCULAR; INTRAVENOUS; SUBCUTANEOUS
Status: CANCELLED | OUTPATIENT
Start: 2022-12-28

## 2022-12-28 RX ORDER — OXYTOCIN 10 [USP'U]/ML
10 INJECTION, SOLUTION INTRAMUSCULAR; INTRAVENOUS
Status: CANCELLED | OUTPATIENT
Start: 2022-12-28

## 2022-12-28 RX ORDER — MISOPROSTOL 200 UG/1
800 TABLET ORAL
Status: CANCELLED | OUTPATIENT
Start: 2022-12-28

## 2022-12-28 RX ORDER — ONDANSETRON 4 MG/1
4 TABLET, ORALLY DISINTEGRATING ORAL EVERY 6 HOURS PRN
Status: CANCELLED | OUTPATIENT
Start: 2022-12-28

## 2022-12-28 RX ORDER — KETOROLAC TROMETHAMINE 30 MG/ML
30 INJECTION, SOLUTION INTRAMUSCULAR; INTRAVENOUS
Status: CANCELLED | OUTPATIENT
Start: 2022-12-28 | End: 2023-01-02

## 2022-12-28 RX ORDER — PROCHLORPERAZINE MALEATE 5 MG
10 TABLET ORAL EVERY 6 HOURS PRN
Status: CANCELLED | OUTPATIENT
Start: 2022-12-28

## 2022-12-28 RX ORDER — PROCHLORPERAZINE 25 MG
25 SUPPOSITORY, RECTAL RECTAL EVERY 12 HOURS PRN
Status: CANCELLED | OUTPATIENT
Start: 2022-12-28

## 2022-12-28 RX ORDER — NALOXONE HYDROCHLORIDE 0.4 MG/ML
0.2 INJECTION, SOLUTION INTRAMUSCULAR; INTRAVENOUS; SUBCUTANEOUS
Status: CANCELLED | OUTPATIENT
Start: 2022-12-28

## 2022-12-28 RX ORDER — BREAST PUMP
EACH MISCELLANEOUS
Qty: 1 EACH | Refills: 0 | Status: SHIPPED | OUTPATIENT
Start: 2022-12-28 | End: 2024-02-08

## 2022-12-28 RX ORDER — OXYTOCIN/0.9 % SODIUM CHLORIDE 30/500 ML
100-340 PLASTIC BAG, INJECTION (ML) INTRAVENOUS CONTINUOUS PRN
Status: CANCELLED | OUTPATIENT
Start: 2022-12-28

## 2022-12-28 RX ORDER — ACETAMINOPHEN 325 MG/1
650 TABLET ORAL EVERY 6 HOURS PRN
Qty: 100 TABLET | Refills: 0 | Status: SHIPPED | OUTPATIENT
Start: 2022-12-28 | End: 2023-03-31

## 2022-12-28 RX ORDER — METHYLERGONOVINE MALEATE 0.2 MG/ML
200 INJECTION INTRAVENOUS
Status: CANCELLED | OUTPATIENT
Start: 2022-12-28

## 2022-12-28 RX ORDER — AMOXICILLIN 250 MG
1 CAPSULE ORAL DAILY
Qty: 100 TABLET | Refills: 0 | Status: SHIPPED | OUTPATIENT
Start: 2022-12-28 | End: 2023-03-31

## 2022-12-28 RX ORDER — IBUPROFEN 600 MG/1
600 TABLET, FILM COATED ORAL EVERY 6 HOURS PRN
Qty: 60 TABLET | Refills: 0 | Status: SHIPPED | OUTPATIENT
Start: 2022-12-28 | End: 2023-03-31

## 2022-12-28 RX ORDER — IBUPROFEN 200 MG
800 TABLET ORAL
Status: CANCELLED | OUTPATIENT
Start: 2022-12-28 | End: 2023-01-02

## 2022-12-28 RX ORDER — OXYTOCIN/0.9 % SODIUM CHLORIDE 30/500 ML
340 PLASTIC BAG, INJECTION (ML) INTRAVENOUS CONTINUOUS PRN
Status: CANCELLED | OUTPATIENT
Start: 2022-12-28

## 2022-12-28 RX ORDER — CITRIC ACID/SODIUM CITRATE 334-500MG
30 SOLUTION, ORAL ORAL
Status: CANCELLED | OUTPATIENT
Start: 2022-12-28

## 2022-12-28 RX ORDER — CARBOPROST TROMETHAMINE 250 UG/ML
250 INJECTION, SOLUTION INTRAMUSCULAR
Status: CANCELLED | OUTPATIENT
Start: 2022-12-28

## 2022-12-28 RX ORDER — MISOPROSTOL 100 UG/1
400 TABLET ORAL
Status: CANCELLED | OUTPATIENT
Start: 2022-12-28

## 2022-12-28 NOTE — PATIENT INSTRUCTIONS
For headaches/fevers/body aches: Acetaminophen (tylenol) 1000mg every 6 hours as needed.  Cough: Guaifenesin (plain mucinex) or Dextromethorphan (plain robitussin).  Sore throat: Cough drops/throat lozenges (Halls, Ricola, Cepacol).  Nasal congestion: pseudoephedrine (sudafed - ok'd by provider), nasal saline spray, or afrin spray (don't use for more than 3-4 days).  Runny Nose/ sneezing: Diphenhydramine (plain benadryl).

## 2022-12-28 NOTE — PROGRESS NOTES
Has a cold right now and hard to sleep. Discussed OTC meds she can take. Has weekly appts made. BE    GBS: Negative  Hemoglobin   Date Value Ref Range Status   12/16/2022 11.4 (L) 11.7 - 15.7 g/dL Final   06/27/2020 12.6 11.7 - 15.7 g/dL Final   ]    Breast pump rx: script done   Labor orders: signed and held  Birth plan: plans epidural  Length of stay: discussed  Disability paperwork: NA  Resident involvement: discussed and agrees.  Discharge meds done : YES

## 2023-01-09 ENCOUNTER — PRENATAL OFFICE VISIT (OUTPATIENT)
Dept: OBGYN | Facility: CLINIC | Age: 33
End: 2023-01-09
Payer: COMMERCIAL

## 2023-01-09 VITALS
BODY MASS INDEX: 31.62 KG/M2 | OXYGEN SATURATION: 99 % | DIASTOLIC BLOOD PRESSURE: 70 MMHG | SYSTOLIC BLOOD PRESSURE: 133 MMHG | WEIGHT: 190 LBS | HEART RATE: 98 BPM

## 2023-01-09 DIAGNOSIS — Z34.80 SUPERVISION OF OTHER NORMAL PREGNANCY, ANTEPARTUM: Primary | ICD-10-CM

## 2023-01-09 PROCEDURE — 99207 PR PRENATAL VISIT: CPT | Performed by: OBSTETRICS & GYNECOLOGY

## 2023-01-09 NOTE — PROGRESS NOTES
Doing well. Farther along than last delivery so happy. Cold has resolved. Discussed no car seat test, etc after 37 wks.  Has appts. BE

## 2023-01-18 ENCOUNTER — PRENATAL OFFICE VISIT (OUTPATIENT)
Dept: OBGYN | Facility: CLINIC | Age: 33
End: 2023-01-18
Payer: COMMERCIAL

## 2023-01-18 VITALS
DIASTOLIC BLOOD PRESSURE: 74 MMHG | SYSTOLIC BLOOD PRESSURE: 135 MMHG | HEART RATE: 88 BPM | BODY MASS INDEX: 31.83 KG/M2 | OXYGEN SATURATION: 100 % | WEIGHT: 191.3 LBS

## 2023-01-18 DIAGNOSIS — Z34.80 SUPERVISION OF OTHER NORMAL PREGNANCY, ANTEPARTUM: Primary | ICD-10-CM

## 2023-01-18 PROCEDURE — 99207 PR PRENATAL VISIT: CPT | Performed by: OBSTETRICS & GYNECOLOGY

## 2023-01-18 NOTE — PROGRESS NOTES
Feeling lot of pelvic pressure and groin pain. No real contractions. cx 2/30/-3 and mid today. Plan repeat GBS next visit if undelivered. BE

## 2023-01-23 ENCOUNTER — PRENATAL OFFICE VISIT (OUTPATIENT)
Dept: OBGYN | Facility: CLINIC | Age: 33
End: 2023-01-23
Payer: COMMERCIAL

## 2023-01-23 VITALS
OXYGEN SATURATION: 99 % | DIASTOLIC BLOOD PRESSURE: 71 MMHG | SYSTOLIC BLOOD PRESSURE: 118 MMHG | WEIGHT: 192 LBS | BODY MASS INDEX: 31.95 KG/M2 | HEART RATE: 96 BPM

## 2023-01-23 DIAGNOSIS — Z34.80 SUPERVISION OF OTHER NORMAL PREGNANCY, ANTEPARTUM: Primary | ICD-10-CM

## 2023-01-23 PROCEDURE — 87653 STREP B DNA AMP PROBE: CPT | Performed by: OBSTETRICS & GYNECOLOGY

## 2023-01-23 PROCEDURE — 99207 PR PRENATAL VISIT: CPT | Performed by: OBSTETRICS & GYNECOLOGY

## 2023-01-23 NOTE — PROGRESS NOTES
Son is sick again. GBS done again today. Will plan to add BPP to appt next week with me if undelivered. No questions. BE

## 2023-01-24 LAB — GP B STREP DNA SPEC QL NAA+PROBE: NEGATIVE

## 2023-01-27 ENCOUNTER — TELEPHONE (OUTPATIENT)
Dept: NURSING | Facility: CLINIC | Age: 33
End: 2023-01-27

## 2023-01-27 ENCOUNTER — HOSPITAL ENCOUNTER (INPATIENT)
Facility: CLINIC | Age: 33
LOS: 1 days | Discharge: HOME HEALTH | End: 2023-01-28
Attending: OBSTETRICS & GYNECOLOGY | Admitting: OBSTETRICS & GYNECOLOGY
Payer: COMMERCIAL

## 2023-01-27 PROBLEM — Z34.80 SUPERVISION OF OTHER NORMAL PREGNANCY, ANTEPARTUM: Status: ACTIVE | Noted: 2022-07-14

## 2023-01-27 LAB
ABO/RH(D): NORMAL
ANTIBODY SCREEN: NEGATIVE
BASOPHILS # BLD AUTO: 0.1 10E3/UL (ref 0–0.2)
BASOPHILS NFR BLD AUTO: 0 %
EOSINOPHIL # BLD AUTO: 0.3 10E3/UL (ref 0–0.7)
EOSINOPHIL NFR BLD AUTO: 1 %
ERYTHROCYTE [DISTWIDTH] IN BLOOD BY AUTOMATED COUNT: 13.6 % (ref 10–15)
HCT VFR BLD AUTO: 36.7 % (ref 35–47)
HGB BLD-MCNC: 11.8 G/DL (ref 11.7–15.7)
IMM GRANULOCYTES # BLD: 0.1 10E3/UL
IMM GRANULOCYTES NFR BLD: 1 %
LYMPHOCYTES # BLD AUTO: 1.4 10E3/UL (ref 0.8–5.3)
LYMPHOCYTES NFR BLD AUTO: 7 %
MCH RBC QN AUTO: 28.2 PG (ref 26.5–33)
MCHC RBC AUTO-ENTMCNC: 32.2 G/DL (ref 31.5–36.5)
MCV RBC AUTO: 88 FL (ref 78–100)
MONOCYTES # BLD AUTO: 1.1 10E3/UL (ref 0–1.3)
MONOCYTES NFR BLD AUTO: 6 %
NEUTROPHILS # BLD AUTO: 16.7 10E3/UL (ref 1.6–8.3)
NEUTROPHILS NFR BLD AUTO: 85 %
NRBC # BLD AUTO: 0 10E3/UL
NRBC BLD AUTO-RTO: 0 /100
PLATELET # BLD AUTO: 317 10E3/UL (ref 150–450)
RBC # BLD AUTO: 4.19 10E6/UL (ref 3.8–5.2)
RUPTURE OF FETAL MEMBRANES BY ROM PLUS: POSITIVE
SARS-COV-2 RNA RESP QL NAA+PROBE: NEGATIVE
SPECIMEN EXPIRATION DATE: NORMAL
WBC # BLD AUTO: 19.7 10E3/UL (ref 4–11)

## 2023-01-27 PROCEDURE — 86901 BLOOD TYPING SEROLOGIC RH(D): CPT | Performed by: OBSTETRICS & GYNECOLOGY

## 2023-01-27 PROCEDURE — 86780 TREPONEMA PALLIDUM: CPT | Performed by: OBSTETRICS & GYNECOLOGY

## 2023-01-27 PROCEDURE — 722N000001 HC LABOR CARE VAGINAL DELIVERY SINGLE

## 2023-01-27 PROCEDURE — U0003 INFECTIOUS AGENT DETECTION BY NUCLEIC ACID (DNA OR RNA); SEVERE ACUTE RESPIRATORY SYNDROME CORONAVIRUS 2 (SARS-COV-2) (CORONAVIRUS DISEASE [COVID-19]), AMPLIFIED PROBE TECHNIQUE, MAKING USE OF HIGH THROUGHPUT TECHNOLOGIES AS DESCRIBED BY CMS-2020-01-R: HCPCS | Performed by: OBSTETRICS & GYNECOLOGY

## 2023-01-27 PROCEDURE — 250N000013 HC RX MED GY IP 250 OP 250 PS 637

## 2023-01-27 PROCEDURE — 250N000013 HC RX MED GY IP 250 OP 250 PS 637: Performed by: STUDENT IN AN ORGANIZED HEALTH CARE EDUCATION/TRAINING PROGRAM

## 2023-01-27 PROCEDURE — 84112 EVAL AMNIOTIC FLUID PROTEIN: CPT | Performed by: OBSTETRICS & GYNECOLOGY

## 2023-01-27 PROCEDURE — 36415 COLL VENOUS BLD VENIPUNCTURE: CPT | Performed by: OBSTETRICS & GYNECOLOGY

## 2023-01-27 PROCEDURE — 59400 OBSTETRICAL CARE: CPT | Mod: GC | Performed by: OBSTETRICS & GYNECOLOGY

## 2023-01-27 PROCEDURE — 120N000002 HC R&B MED SURG/OB UMMC

## 2023-01-27 PROCEDURE — 85025 COMPLETE CBC W/AUTO DIFF WBC: CPT | Performed by: OBSTETRICS & GYNECOLOGY

## 2023-01-27 RX ORDER — PROCHLORPERAZINE 25 MG
25 SUPPOSITORY, RECTAL RECTAL EVERY 12 HOURS PRN
Status: DISCONTINUED | OUTPATIENT
Start: 2023-01-27 | End: 2023-01-27 | Stop reason: HOSPADM

## 2023-01-27 RX ORDER — OXYTOCIN/0.9 % SODIUM CHLORIDE 30/500 ML
340 PLASTIC BAG, INJECTION (ML) INTRAVENOUS CONTINUOUS PRN
Status: DISCONTINUED | OUTPATIENT
Start: 2023-01-27 | End: 2023-01-27 | Stop reason: HOSPADM

## 2023-01-27 RX ORDER — OXYTOCIN 10 [USP'U]/ML
10 INJECTION, SOLUTION INTRAMUSCULAR; INTRAVENOUS
Status: DISCONTINUED | OUTPATIENT
Start: 2023-01-27 | End: 2023-01-28 | Stop reason: HOSPADM

## 2023-01-27 RX ORDER — CITRIC ACID/SODIUM CITRATE 334-500MG
30 SOLUTION, ORAL ORAL
Status: DISCONTINUED | OUTPATIENT
Start: 2023-01-27 | End: 2023-01-27 | Stop reason: HOSPADM

## 2023-01-27 RX ORDER — METHYLERGONOVINE MALEATE 0.2 MG/ML
200 INJECTION INTRAVENOUS
Status: DISCONTINUED | OUTPATIENT
Start: 2023-01-27 | End: 2023-01-28 | Stop reason: HOSPADM

## 2023-01-27 RX ORDER — PROCHLORPERAZINE MALEATE 10 MG
10 TABLET ORAL EVERY 6 HOURS PRN
Status: DISCONTINUED | OUTPATIENT
Start: 2023-01-27 | End: 2023-01-27 | Stop reason: HOSPADM

## 2023-01-27 RX ORDER — METHYLERGONOVINE MALEATE 0.2 MG/ML
200 INJECTION INTRAVENOUS
Status: DISCONTINUED | OUTPATIENT
Start: 2023-01-27 | End: 2023-01-27 | Stop reason: HOSPADM

## 2023-01-27 RX ORDER — IBUPROFEN 800 MG/1
800 TABLET, FILM COATED ORAL
Status: DISCONTINUED | OUTPATIENT
Start: 2023-01-27 | End: 2023-01-28

## 2023-01-27 RX ORDER — CARBOPROST TROMETHAMINE 250 UG/ML
250 INJECTION, SOLUTION INTRAMUSCULAR
Status: DISCONTINUED | OUTPATIENT
Start: 2023-01-27 | End: 2023-01-28 | Stop reason: HOSPADM

## 2023-01-27 RX ORDER — ACETAMINOPHEN 325 MG/1
650 TABLET ORAL EVERY 4 HOURS PRN
Status: DISCONTINUED | OUTPATIENT
Start: 2023-01-27 | End: 2023-01-28 | Stop reason: HOSPADM

## 2023-01-27 RX ORDER — OXYTOCIN 10 [USP'U]/ML
INJECTION, SOLUTION INTRAMUSCULAR; INTRAVENOUS
Status: DISCONTINUED
Start: 2023-01-27 | End: 2023-01-27 | Stop reason: HOSPADM

## 2023-01-27 RX ORDER — MISOPROSTOL 200 UG/1
400 TABLET ORAL
Status: DISCONTINUED | OUTPATIENT
Start: 2023-01-27 | End: 2023-01-28 | Stop reason: HOSPADM

## 2023-01-27 RX ORDER — OXYTOCIN/0.9 % SODIUM CHLORIDE 30/500 ML
PLASTIC BAG, INJECTION (ML) INTRAVENOUS
Status: DISCONTINUED
Start: 2023-01-27 | End: 2023-01-27 | Stop reason: HOSPADM

## 2023-01-27 RX ORDER — LIDOCAINE 40 MG/G
CREAM TOPICAL
Status: DISCONTINUED | OUTPATIENT
Start: 2023-01-27 | End: 2023-01-27 | Stop reason: HOSPADM

## 2023-01-27 RX ORDER — TRANEXAMIC ACID 10 MG/ML
1 INJECTION, SOLUTION INTRAVENOUS EVERY 30 MIN PRN
Status: DISCONTINUED | OUTPATIENT
Start: 2023-01-27 | End: 2023-01-27 | Stop reason: HOSPADM

## 2023-01-27 RX ORDER — OXYTOCIN/0.9 % SODIUM CHLORIDE 30/500 ML
340 PLASTIC BAG, INJECTION (ML) INTRAVENOUS CONTINUOUS PRN
Status: DISCONTINUED | OUTPATIENT
Start: 2023-01-27 | End: 2023-01-28 | Stop reason: HOSPADM

## 2023-01-27 RX ORDER — TRANEXAMIC ACID 10 MG/ML
1 INJECTION, SOLUTION INTRAVENOUS EVERY 30 MIN PRN
Status: DISCONTINUED | OUTPATIENT
Start: 2023-01-27 | End: 2023-01-28 | Stop reason: HOSPADM

## 2023-01-27 RX ORDER — CARBOPROST TROMETHAMINE 250 UG/ML
250 INJECTION, SOLUTION INTRAMUSCULAR
Status: DISCONTINUED | OUTPATIENT
Start: 2023-01-27 | End: 2023-01-27 | Stop reason: HOSPADM

## 2023-01-27 RX ORDER — MISOPROSTOL 200 UG/1
TABLET ORAL
Status: DISCONTINUED
Start: 2023-01-27 | End: 2023-01-27 | Stop reason: WASHOUT

## 2023-01-27 RX ORDER — ONDANSETRON 4 MG/1
4 TABLET, ORALLY DISINTEGRATING ORAL EVERY 6 HOURS PRN
Status: DISCONTINUED | OUTPATIENT
Start: 2023-01-27 | End: 2023-01-27 | Stop reason: HOSPADM

## 2023-01-27 RX ORDER — IBUPROFEN 800 MG/1
800 TABLET, FILM COATED ORAL EVERY 6 HOURS PRN
Status: DISCONTINUED | OUTPATIENT
Start: 2023-01-27 | End: 2023-01-28 | Stop reason: HOSPADM

## 2023-01-27 RX ORDER — KETOROLAC TROMETHAMINE 30 MG/ML
30 INJECTION, SOLUTION INTRAMUSCULAR; INTRAVENOUS
Status: DISCONTINUED | OUTPATIENT
Start: 2023-01-27 | End: 2023-01-28

## 2023-01-27 RX ORDER — SODIUM CHLORIDE, SODIUM LACTATE, POTASSIUM CHLORIDE, CALCIUM CHLORIDE 600; 310; 30; 20 MG/100ML; MG/100ML; MG/100ML; MG/100ML
INJECTION, SOLUTION INTRAVENOUS
Status: DISCONTINUED
Start: 2023-01-27 | End: 2023-01-27 | Stop reason: HOSPADM

## 2023-01-27 RX ORDER — MISOPROSTOL 200 UG/1
400 TABLET ORAL
Status: DISCONTINUED | OUTPATIENT
Start: 2023-01-27 | End: 2023-01-27 | Stop reason: HOSPADM

## 2023-01-27 RX ORDER — FENTANYL CITRATE 50 UG/ML
50 INJECTION, SOLUTION INTRAMUSCULAR; INTRAVENOUS EVERY 30 MIN PRN
Status: DISCONTINUED | OUTPATIENT
Start: 2023-01-27 | End: 2023-01-28 | Stop reason: HOSPADM

## 2023-01-27 RX ORDER — NALOXONE HYDROCHLORIDE 0.4 MG/ML
0.4 INJECTION, SOLUTION INTRAMUSCULAR; INTRAVENOUS; SUBCUTANEOUS
Status: DISCONTINUED | OUTPATIENT
Start: 2023-01-27 | End: 2023-01-27 | Stop reason: HOSPADM

## 2023-01-27 RX ORDER — OXYTOCIN/0.9 % SODIUM CHLORIDE 30/500 ML
100-340 PLASTIC BAG, INJECTION (ML) INTRAVENOUS CONTINUOUS PRN
Status: DISCONTINUED | OUTPATIENT
Start: 2023-01-27 | End: 2023-01-28 | Stop reason: HOSPADM

## 2023-01-27 RX ORDER — MISOPROSTOL 200 UG/1
800 TABLET ORAL
Status: DISCONTINUED | OUTPATIENT
Start: 2023-01-27 | End: 2023-01-27 | Stop reason: HOSPADM

## 2023-01-27 RX ORDER — NALOXONE HYDROCHLORIDE 0.4 MG/ML
0.2 INJECTION, SOLUTION INTRAMUSCULAR; INTRAVENOUS; SUBCUTANEOUS
Status: DISCONTINUED | OUTPATIENT
Start: 2023-01-27 | End: 2023-01-27 | Stop reason: HOSPADM

## 2023-01-27 RX ORDER — HYDROCORTISONE 25 MG/G
CREAM TOPICAL 3 TIMES DAILY PRN
Status: DISCONTINUED | OUTPATIENT
Start: 2023-01-27 | End: 2023-01-28 | Stop reason: HOSPADM

## 2023-01-27 RX ORDER — ONDANSETRON 2 MG/ML
4 INJECTION INTRAMUSCULAR; INTRAVENOUS EVERY 6 HOURS PRN
Status: DISCONTINUED | OUTPATIENT
Start: 2023-01-27 | End: 2023-01-27 | Stop reason: HOSPADM

## 2023-01-27 RX ORDER — DOCUSATE SODIUM 100 MG/1
100 CAPSULE, LIQUID FILLED ORAL DAILY
Status: DISCONTINUED | OUTPATIENT
Start: 2023-01-27 | End: 2023-01-28 | Stop reason: HOSPADM

## 2023-01-27 RX ORDER — MODIFIED LANOLIN
OINTMENT (GRAM) TOPICAL
Status: DISCONTINUED | OUTPATIENT
Start: 2023-01-27 | End: 2023-01-28 | Stop reason: HOSPADM

## 2023-01-27 RX ORDER — FENTANYL CITRATE 50 UG/ML
INJECTION, SOLUTION INTRAMUSCULAR; INTRAVENOUS
Status: DISCONTINUED
Start: 2023-01-27 | End: 2023-01-27 | Stop reason: WASHOUT

## 2023-01-27 RX ORDER — BISACODYL 10 MG
10 SUPPOSITORY, RECTAL RECTAL DAILY PRN
Status: DISCONTINUED | OUTPATIENT
Start: 2023-01-27 | End: 2023-01-28 | Stop reason: HOSPADM

## 2023-01-27 RX ORDER — LIDOCAINE HYDROCHLORIDE 10 MG/ML
INJECTION, SOLUTION EPIDURAL; INFILTRATION; INTRACAUDAL; PERINEURAL
Status: DISCONTINUED
Start: 2023-01-27 | End: 2023-01-27 | Stop reason: WASHOUT

## 2023-01-27 RX ORDER — OXYTOCIN 10 [USP'U]/ML
10 INJECTION, SOLUTION INTRAMUSCULAR; INTRAVENOUS
Status: DISCONTINUED | OUTPATIENT
Start: 2023-01-27 | End: 2023-01-27 | Stop reason: HOSPADM

## 2023-01-27 RX ORDER — MISOPROSTOL 200 UG/1
800 TABLET ORAL
Status: DISCONTINUED | OUTPATIENT
Start: 2023-01-27 | End: 2023-01-28 | Stop reason: HOSPADM

## 2023-01-27 RX ADMIN — IBUPROFEN 800 MG: 800 TABLET, FILM COATED ORAL at 13:09

## 2023-01-27 RX ADMIN — IBUPROFEN 800 MG: 800 TABLET, FILM COATED ORAL at 23:53

## 2023-01-27 RX ADMIN — ACETAMINOPHEN 650 MG: 325 TABLET ORAL at 14:20

## 2023-01-27 ASSESSMENT — ACTIVITIES OF DAILY LIVING (ADL)
ADLS_ACUITY_SCORE: 35
ADLS_ACUITY_SCORE: 20

## 2023-01-27 NOTE — H&P
History and Physical   2023  Maile Gonsalez  [id number]      HPI: Maile Gonsalez is a 32 year old  at 39w2d by LMP c/w 7w4d US who presents in active labor. SROM at home at 0500 today.    ROS: limited given active labor    Her pregnancy is complicated by:  - uncomplicated    OBHX:   OB History    Para Term  AB Living   2 2 1 1 0 2   SAB IAB Ectopic Multiple Live Births   0 0 0 0 2      # Outcome Date GA Lbr Stephen/2nd Weight Sex Delivery Anes PTL Lv   2 Term 23 39w2d   F Vag-Spont   NHUNG      Name: LONI,FEMALE-MAILE      Apgar1: 9  Apgar5: 9   1  20 36w2d 02:20 / 00:53 2.63 kg (5 lb 12.8 oz) M Vag-Spont EPI Y NHUNG      Birth Comments: one week stay in NICU      Complications: PROM (premature rupture of membranes)      Name: LONIMALE-MAILE      Apgar1: 8  Apgar5: 9       MedicalHX:   Past Medical History:   Diagnosis Date     Alopecia universalis      Asthma     cats and humidity        SurgicalHX:   Past Surgical History:   Procedure Laterality Date     DENTAL SURGERY      wisdom teeth       Medications:   No current facility-administered medications on file prior to encounter.  acetaminophen (TYLENOL) 325 MG tablet, Take 2 tablets (650 mg) by mouth every 6 hours as needed for mild pain Start after Delivery.  albuterol (PROAIR HFA/PROVENTIL HFA/VENTOLIN HFA) 108 (90 Base) MCG/ACT inhaler, Inhale 1-2 puffs into the lungs every 6 hours as needed for shortness of breath / dyspnea or wheezing  Prenatal MV-Min-Fe Fum-FA-DHA (PRENATAL+DHA PO),   ibuprofen (ADVIL/MOTRIN) 600 MG tablet, Take 1 tablet (600 mg) by mouth every 6 hours as needed for moderate pain (4-6) Start after delivery  Misc. Devices (BREAST PUMP) MISC, Double electric breast pump  Misc. Devices (BREAST PUMP) MISC, 1 each continuous prn (breast feeding)  omeprazole (PRILOSEC) 40 MG DR capsule, Take 1 capsule (40 mg) by mouth daily  ondansetron (ZOFRAN) 4 MG tablet, Take 1 tablet (4 mg) by mouth every 8  hours as needed for nausea  senna-docusate (SENOKOT-S/PERICOLACE) 8.6-50 MG tablet, Take 1 tablet by mouth daily Start after delivery.        Allergies:  Allergies   Allergen Reactions     Cats        FamilyHX:  Family History   Problem Relation Age of Onset     Hypertension Father      Hyperlipidemia Father      SocialHX:   Denies drinking, smoking, drug use in pregnancy    ROS: 10-point ROS negative except as indicated in HPI.    Physical Exam:  Vitals:    23 1210 23 1300 23 1316 23 1346   BP: 132/74 130/72 126/67 132/80   BP Location:  Left arm     Patient Position:  Semi-Siddiqui's     Cuff Size:  Adult Regular     Resp:  24     Temp:  98.3  F (36.8  C)     TempSrc:  Oral       General: alert, oriented female, resting in bed in NAD    SVE: 8 cm  Presentation: cephalic by BSUS    FHT: baseline 145, moderate variability, present accelerations, intermittent variable decelerations  Ruso: 4 contractions in 10 mins    Prenatal Labs:      Lab Results   Component Value Date    ABO O 2020    RH Pos 2020    AS Negative 2022    HEPBANG Nonreactive 2022    CHPCRT Negative 2020    GCPCRT Negative 2020    HGB 11.8 2023       GBS Status:   Lab Results   Component Value Date    GBS Negative 2020       Lab Results   Component Value Date    PAP NIL 2018         Assessment: 32 year old  at 39w2d by LMP c/w 7w4d US, here for spontaneous active labor    Plan:    # Active labor  - Membranes: s/p SROM 0500 on 2023  - Augmentation: not indicated  - Labs: CBC, T&S, RPR  - GBS neg; Antibiotics not indicated.  - Pain Control: Per patient request; Discussed options    - Desires epidural in active labor.   - Diet: regular  - PPH Meds: avoid hemabate    # PNC  - Rh pos, Rubella immune, , GBS neg  - Other prenatal labs wnl  - s/p COVID, flu, Tdap vaccines  - Pap last 10/2021 NILM    # FWB:   Cat 1 tracing, reactive; ceph by BSUS  - Continuous Fetal  Monitoring  - Intrauterine resuscitative measures prn      The patient was discussed with Dr. Huston who is in agreement with the treatment plan.    Esmer Grissom MD  ObGyn Resident, PGY-3  2023 1:58 PM     Physician Attestation   I, Aurelia Huston MD, personally examined and evaluated this patient.  I discussed the patient with the resident and care team, and agree with the assessment and plan of care as documented in the note above.   I personally reviewed vital signs, medications, labs, fetal heart tones and toco.  Key findings: Patient is here in active labor.  She is desiring an epidural but is moving fast.  Will attempt IV placement and labs. If there Is still time will call anesthesia for epidural.  Fetal status is reassuring with a reactive NST  Expect  soon.   Aurelia Huston MD   2023

## 2023-01-27 NOTE — DISCHARGE SUMMARY
VAGINAL DELIVERY DISCHARGE SUMMARY    Admit date: 2023  Discharge date: 23    Admit Dx:   - 32 year old  at 39w2d   - Spontaneous active labor    Discharge Dx:  - Same as above, s/p       Procedures:  - Spontaneous vaginal delivery    Admit HPI/Intrapartum Course:  Bhumika Gonsalez is a 32 year old  at 39w2d by LMP c/w 7w4d US who was admitted for spontaneous active labor on 2023. Pregnancy was uncomplicated. Upon admission her cervix was 8 cm dilated after SROM at home. She quickly progressed to complete at 1249 and began pushing at that time. At 1255 a vigorous female infant was delivered in the ANABEL position with apgars of 9 and 9. Weight 3330g. Delayed cord clamping was done for >60 seconds. The placenta delivered spontaneously, intact with a 3 vessel cord after 5 min. Pitocin was given after delivery of the infant. A right periurethral laceration was present but was hemostatic so not repaired. QBL of 50mL. Sponge and sharp counts were correct. Dr. Huston was present for delivery.     Postpartum course:  Her postpartum course was uncomplicated. On PPD#1, she was meeting all of her postpartum goals and deemed stable for discharge. She was voiding without difficulty, tolerating a regular diet without nausea and vomiting, her pain was well controlled on oral pain medicines and her lochia was appropriate. Her hemoglobin prior to delivery was not obtained and after delivery was 10.6. Her Rh status was pos, and Rhogam was not indicated.     Discharge Medications:     Review of your medicines      CONTINUE these medicines which have NOT CHANGED      Dose / Directions   acetaminophen 325 MG tablet  Commonly known as: TYLENOL  Used for: Supervision of other normal pregnancy, antepartum      Dose: 650 mg  Take 2 tablets (650 mg) by mouth every 6 hours as needed for mild pain Start after Delivery.  Quantity: 100 tablet  Refills: 0     albuterol 108 (90 Base) MCG/ACT inhaler  Commonly known  as: PROAIR HFA/PROVENTIL HFA/VENTOLIN HFA  Used for: Routine general medical examination at a health care facility      Dose: 1-2 puff  Inhale 1-2 puffs into the lungs every 6 hours as needed for shortness of breath / dyspnea or wheezing  Quantity: 8.5 g  Refills: 1     * breast pump Misc  Used for: Breastfeeding (infant)      Dose: 1 each  1 each continuous prn (breast feeding)  Quantity: 1 each  Refills: 0     * breast pump Misc  Used for: Supervision of other normal pregnancy, antepartum      Double electric breast pump  Quantity: 1 each  Refills: 0     ibuprofen 600 MG tablet  Commonly known as: ADVIL/MOTRIN  Used for: Supervision of other normal pregnancy, antepartum      Dose: 600 mg  Take 1 tablet (600 mg) by mouth every 6 hours as needed for moderate pain (4-6) Start after delivery  Quantity: 60 tablet  Refills: 0     omeprazole 40 MG DR capsule  Commonly known as: priLOSEC  Used for: Gastroesophageal reflux disease without esophagitis      Dose: 40 mg  Take 1 capsule (40 mg) by mouth daily  Quantity: 30 capsule  Refills: 3     ondansetron 4 MG tablet  Commonly known as: ZOFRAN  Used for: Nausea      Dose: 4 mg  Take 1 tablet (4 mg) by mouth every 8 hours as needed for nausea  Quantity: 60 tablet  Refills: 1     PRENATAL+DHA PO      Refills: 0     senna-docusate 8.6-50 MG tablet  Commonly known as: SENOKOT-S/PERICOLACE  Used for: Supervision of other normal pregnancy, antepartum      Dose: 1 tablet  Take 1 tablet by mouth daily Start after delivery.  Quantity: 100 tablet  Refills: 0         * This list has 2 medication(s) that are the same as other medications prescribed for you. Read the directions carefully, and ask your doctor or other care provider to review them with you.                  Discharge/Disposition:  Bhumika Gonsalez was discharged to home in stable condition with the following instructions/medications:  -Call for temperature > 100.4, bright red vaginal bleeding >1 pad an hour x 2 hours, foul  smelling vaginal discharge, pain not controlled by usual oral pain meds, persistent nausea and vomiting not controlled on medications  -For feeding she decided to breast feed.  4) She was instructed to follow-up with her primary OB in 6 weeks for a routine postpartum visit.    Leydi Valencia MD

## 2023-01-27 NOTE — PLAN OF CARE
Patient arrived to [facility] unit via wheelchair at 1530,with belongings, accompanied by spouse/ significant other, with infant in arms. Received report from Agustina MARTINEZ and checked bands. Unit and room orientation completd. Call light given; no concerns present at this time. Continue with plan of care.

## 2023-01-27 NOTE — PROVIDER NOTIFICATION
01/27/23 1236   Provider Notification   Provider Name/Title DR Houston   Method of Notification At Bedside     Dr Houston at bedside evaluating Bhumika for labor. Arrived grossly ruptured clear fluid. SVE 8cm dilated. BSUS confirms cephalic position. Requesting epidural.

## 2023-01-27 NOTE — TELEPHONE ENCOUNTER
Pt stated that she thinks her water broke, just a slow trickle with clear fluid. Pt confirms baby moving well.  Pt denies bleeding.  Pt states that contractions are 5-8 minutes apart not consistent and lasting 40 secs.  Pt advised of the 5-1-1 plan.  Pt was induced at 36 wks with first pregnancy.  Pt is GBS-.  Routed message to on-call provider.  Pt opted to stay home for a while. Message routed to RR whom is on call

## 2023-01-27 NOTE — PLAN OF CARE
Goal Outcome Evaluation:  Delivery baby girl at 1255; not time for epidural or IV placement, pt was completed and pushing. Pt doing well.  Pain is adequately controlled.  No fevers.  No history of foul-smelling vaginal discharge.  Good appetite.  Denies chest pain, shortness of breath, nausea or vomiting.  Vaginal bleeding minimal.  Breastfeeding well. Pitocin IM given after birth (see MAR).

## 2023-01-27 NOTE — L&D DELIVERY NOTE
OB Vaginal Delivery Note    Bhumika Gonsalez MRN# [id number]   Age: 32 year old YOB: 1990     Bhumika Gonsalez is a 32 year old  at 39w2d by LMP c/w 7w4d US who was admitted for spontaneous active labor on 2023. Pregnancy was uncomplicated. Upon admission her cervix was 8 cm dilated after SROM at home. She quickly progressed to complete at 1249 and began pushing at that time. At 1255 a vigorous female infant was delivered in the ANABEL position with apgars of 9 and 9. Weight 3330g. Delayed cord clamping was done for >60 seconds. The placenta delivered spontaneously, intact with a 3 vessel cord after 5 min. Pitocin was given after delivery of the infant. A right periurethral laceration was present but was hemostatic so not repaired. QBL of 50mL. Sponge and sharp counts were correct. Dr. Huston was present for delivery.     Aurelia Richmond MD  ObGyn Resident, PGY-3  2023      Physician Attestation   I was present for this uncomplicated vaginal delivery.  I have reviewed and edited the above delivery report to reflect the exact findings and details of the birth.  Mom and baby stable following birth.      Aurelia Huston MD   2023       GA: 39w2d  GP:   Labor Complications:    EBL:   mL  Delivery QBL:    Delivery Type: Vaginal, Spontaneous   ROM to Delivery Time: (Delivered) Hours: 7 Minutes: 55  [place] Weight:     1 Minute 5 Minute 10 Minute   Apgar Totals: 9   9        AURELIA RICHMOND     Delivery Details:  Bhumika Gonsalez, a 32 year old  female delivered a viable infant with apgars of 9  and 9 . Patient was fully dilated and pushing after   hours   minutes in active labor. Delivery was via vaginal, spontaneous  to a sterile field under   anesthesia. Infant delivered in   right  occiput  anterior  position. Anterior and posterior shoulders delivered without difficulty. The cord was clamped, cut twice and 3 vessels  were noted. Cord blood was obtained in  routine fashion with the following disposition: lab .      Cord complications: nuchal   Placenta delivered at  . Placental disposition was Hospital disposal . Fundal massage performed and fundus found to be firm.     Episiotomy: none    Perineum, vagina, cervix were inspected, and the following lacerations were noted:   Perineal lacerations:    periurethral laceration: right             Excellent hemostasis was noted. Needle count correct. Infant and patient in delivery room in good and stable condition.        Renée Gonsalez [[id number]]    Labor Event Times    Labor onset date: 23 Onset time:  9:00 AM      Rupture date/time: 23 0500   Rupture type: Spontaneous rupture of membranes occuring during spontaneous labor or augmentation  Fluid color: Clear  Fluid odor: Normal     Augmentation: None     Delivery/Placenta Date and Time    Delivery Date: 23 Delivery Time: 12:55 PM   Oxytocin given at the time of delivery: after delivery of baby  Delivering clinician: Aurelia Huston MD   Other personnel present at delivery:  Provider Role   Esmer Grissom MD Resident         Apgars    Living status: Living   1 Minute 5 Minute 10 Minute 15 Minute 20 Minute   Skin color: 1  1       Heart rate: 2  2       Reflex irritability: 2  2       Muscle tone: 2  2       Respiratory effort: 2  2       Total: 9  9       Apgars assigned by: MODESTA NG RN     Cord    Vessels: 3 Vessels    Cord Complications: Nuchal   Nuchal Intervention: delivered through         Nuchal cord description: loose nuchal cord         Cord Blood Disposition: Lab    Gases Sent?: No    Delayed cord clamping?: Yes    Cord Clamping Delay (seconds):  seconds        Resuscitation     Care at Delivery: Stimulated to cry. Pinked up well. Placed skin to skin with mother.     Skin to Skin and Feeding Plan    Skin to skin initiation date/time: 1841    Skin to skin with: Mother  Skin to skin end date/time:        Labor  Events and Shoulder Dystocia    Fetal Tracing Prior to Delivery: Category 1  Shoulder dystocia present?: Neg     Delivery (Maternal) (Provider to Complete) (305089)    Episiotomy: None  Periurethral laceration: right Repaired?: No      Blood Loss  Mother: Bhumika Gonsalez #[id number]   Start of Mother's Information    Delivery Blood Loss  01/27/23 0900 - 01/27/23 1357    None           End of Mother's Information  Mother: Bhumika Gonsalez #[id number]          Delivery - Provider to Complete (586439)    Delivering clinician: Aurelia Huston MD  Delivery Type (Choose the 1 that will go to the Birth History): Vaginal, Spontaneous                   Other personnel:  Provider Role   Esmer Grissom MD Resident                Placenta    Removal: Expressed  Disposition: Hospital disposal           Presentation and Position    Position: Right Occiput Anterior                 Esmer Grissom MD

## 2023-01-27 NOTE — PLAN OF CARE
Data: Patient presented to [facility] at 1200.   Reason for maternal/fetal assessment per patient is spontaneous rupture of membranes and labor.  Patient is a . Prenatal record reviewed.      OB History    Para Term  AB Living   2 2 1 1 0 2   SAB IAB Ectopic Multiple Live Births   0 0 0 0 2      # Outcome Date GA Lbr Stephen/2nd Weight Sex Delivery Anes PTL Lv   2 Term 23 39w2d 03:49 / 00:06  F Vag-Spont None N NHUNG      Name: LONIFEMALETAMARA      Apgar1: 9  Apgar5: 9   1  20 36w2d 02:20 / 00:53 2.63 kg (5 lb 12.8 oz) M Vag-Spont EPI Y NHUNG      Birth Comments: one week stay in NICU      Complications: PROM (premature rupture of membranes)      Name: MENDOZA IVEY      Apgar1: 8  Apgar5: 9   . Medical history:   Past Medical History:   Diagnosis Date     Alopecia universalis      Asthma     cats and humidity    . Gestational Age 39w2d. VSS. Fetal movement present. Patient denies UTI symptoms, GI problems, edema, headache, visual disturbances, epigastric or URQ pain. Support person is present.  Action: Verbal consent for EFM.  EFM applied for fetal assessment with SROM and labor. Uterine assessment done- see doc flow. Fetal assessment: Presumed adequate fetal oxygenation documented (see flow record).   Response: Dr. Huston informed of arrival- ruptured and laboring. Plan per provider is admit to labor- pain medication per Pt request. Patient verbalized agreement with plan. Patient transferred to room 475 ambulatory, oriented to room and call light.

## 2023-01-27 NOTE — PLAN OF CARE
Goal Outcome Evaluation:  Data: Bhumika Gonsalez transferred to [facility] via wheelchair at 1500. Baby transferred via parent's arms. Voided. Ambulating, eating and drinking well.   Action: Receiving unit notified of transfer: Yes. Patient and family notified of room change. Report given to Kate MARTINEZ at bedside. Belongings sent to receiving unit. Accompanied by Registered Nurse. Oriented patient to surroundings. Call light within reach. ID bands double-checked with receiving RN.  Response: Patient tolerated transfer and is stable.

## 2023-01-28 VITALS
SYSTOLIC BLOOD PRESSURE: 128 MMHG | DIASTOLIC BLOOD PRESSURE: 79 MMHG | RESPIRATION RATE: 16 BRPM | TEMPERATURE: 98.4 F | HEART RATE: 74 BPM

## 2023-01-28 LAB
HGB BLD-MCNC: 10.6 G/DL (ref 11.7–15.7)
T PALLIDUM AB SER QL: NONREACTIVE

## 2023-01-28 PROCEDURE — 36415 COLL VENOUS BLD VENIPUNCTURE: CPT | Performed by: STUDENT IN AN ORGANIZED HEALTH CARE EDUCATION/TRAINING PROGRAM

## 2023-01-28 PROCEDURE — 250N000013 HC RX MED GY IP 250 OP 250 PS 637: Performed by: STUDENT IN AN ORGANIZED HEALTH CARE EDUCATION/TRAINING PROGRAM

## 2023-01-28 PROCEDURE — 85018 HEMOGLOBIN: CPT | Performed by: STUDENT IN AN ORGANIZED HEALTH CARE EDUCATION/TRAINING PROGRAM

## 2023-01-28 RX ADMIN — DOCUSATE SODIUM 100 MG: 100 CAPSULE, LIQUID FILLED ORAL at 08:18

## 2023-01-28 ASSESSMENT — ACTIVITIES OF DAILY LIVING (ADL)
ADLS_ACUITY_SCORE: 20

## 2023-01-28 NOTE — PLAN OF CARE
Goal Outcome Evaluation:  Patient is stable, denies pain/discomfort. She's moving well in th room  and voids spontaneously. Bonding well with baby and breastfeeding on demand. Will continue with plan of care.      Plan of Care Reviewed With: patient    Overall Patient Progress: improvingOverall Patient Progress: improving

## 2023-01-28 NOTE — PLAN OF CARE
Goal Outcome Evaluation:  VSS and postpartum assessments WDL.  Up ad federico with steady gait and independent with cares.  Bonding well with infant.  Breastfeeding on cue every 3 hours per pt.  Pain managed with ibuprofen over night. Will continue with postpartum cares and education per plan of care.     Plan of Care Reviewed With: patient    Overall Patient Progress: improvingOverall Patient Progress: improving

## 2023-01-28 NOTE — PROGRESS NOTES
Post Partum Progress Note  PPD#1    Subjective:  She is resting comfortably in bed this morning. She has no complaints. Pain is improving and well controlled on current medication regimen. She is tolerating PO intake. Lochia present and less than period amount.  She is voiding without difficulty. She has  passed flatus and has  had a BM. She is ambulating without dizziness or difficulty.  She denies headache, changes in vision, nausea/vomiting, chest pain, shortness of breath, RUQ pain, or worsening edema.  Plans to breast feed.    Objective:  Patient Vitals for the past 24 hrs:   BP Temp Temp src Pulse Resp   23 0850 128/79 98.4  F (36.9  C) Oral 74 16   23 2350 132/83 98.1  F (36.7  C) Oral -- 16   23 1940 121/85 97.8  F (36.6  C) Oral 105 16   23 1536 131/89 98.6  F (37  C) Oral 105 18   23 1446 132/77 -- -- -- --   23 1431 123/74 -- -- -- --   23 1416 135/79 -- -- -- --   23 1401 132/78 -- -- -- --   23 1346 132/80 -- -- -- --   23 1316 126/67 -- -- -- --   23 1300 130/72 98.3  F (36.8  C) Oral -- 24   ]    General: NAD, resting comfortably  CV: Regular rate, well perfused.   Pulm: Normal respiratory effort.  Abd: Soft, non-tender, non-distended. Fundus is firm and 1 cm below the umbilicus.    Ext:  lower extremity edema bilaterally. No calf tenderness.    Assessment/Plan:  Bhumika Gonsalez is a 32 year old  female who is PPD#1 s/p .    - Encourage routine post-operative goals including ambulation   - PNC: Rh positive. Rubella immune. No intervention indicated.  - Pain: controlled on oral medications  - Heme: Hgb 11.4>QBL 50>11.8.    - GI: continue anti-emetics and stool softeners as needed.  - : voiding spontaneously  - Infant: Stable   - Feeding: Plans on Breastfeeding.  - BC: Plans on talking at 6 week visit    Discharge to home today    [place] [id number]    Physician Attestation   I personally examined and evaluated this  patient.  I discussed the patient with the resident/fellow and care team, and agree with the assessment and plan of care as documented in the note.     Castellanos findings: Bhumika Gonsalez is a 32 year old  postpartum day number 1 s/p , currently doing well and meeting goals for discharge to home.  Patient states she is feeling well, minimal pain.  Ambulating without dizziness, tolerating regular diet, voiding without issues.  Bleeding is like a period.  Discussed with patient normal vs. Abnormal pp bleeding.  Reviewed s/sx of pp depression and mastitis.  Discussed 6 week postpartum visit.  Message sent to clinic staff to coordinate.  Patient will discharge to home today.     Leydi Valencia MD  Date of Service (when I saw the patient): 23

## 2023-01-28 NOTE — PLAN OF CARE
Goal Outcome Evaluation:  Patient is stable and discharge to home today with baby. Discharge education and instructions reviewed and verbalized understanding.

## 2023-02-13 ENCOUNTER — MEDICAL CORRESPONDENCE (OUTPATIENT)
Dept: HEALTH INFORMATION MANAGEMENT | Facility: CLINIC | Age: 33
End: 2023-02-13

## 2023-03-01 ENCOUNTER — MEDICAL CORRESPONDENCE (OUTPATIENT)
Dept: HEALTH INFORMATION MANAGEMENT | Facility: CLINIC | Age: 33
End: 2023-03-01
Payer: COMMERCIAL

## 2023-03-31 ENCOUNTER — PRENATAL OFFICE VISIT (OUTPATIENT)
Dept: OBGYN | Facility: CLINIC | Age: 33
End: 2023-03-31
Payer: COMMERCIAL

## 2023-03-31 VITALS
BODY MASS INDEX: 28.04 KG/M2 | SYSTOLIC BLOOD PRESSURE: 120 MMHG | OXYGEN SATURATION: 98 % | HEART RATE: 92 BPM | WEIGHT: 168.5 LBS | DIASTOLIC BLOOD PRESSURE: 69 MMHG

## 2023-03-31 PROBLEM — Z34.80 SUPERVISION OF OTHER NORMAL PREGNANCY, ANTEPARTUM: Status: RESOLVED | Noted: 2022-07-14 | Resolved: 2023-03-31

## 2023-03-31 PROBLEM — Z23 NEED FOR TDAP VACCINATION: Status: RESOLVED | Noted: 2022-06-07 | Resolved: 2023-03-31

## 2023-03-31 PROCEDURE — 99207 PR POST PARTUM EXAM: CPT | Performed by: OBSTETRICS & GYNECOLOGY

## 2023-03-31 ASSESSMENT — PATIENT HEALTH QUESTIONNAIRE - PHQ9: SUM OF ALL RESPONSES TO PHQ QUESTIONS 1-9: 1

## 2023-03-31 NOTE — PROGRESS NOTES
SUBJECTIVE:  Bhumika Gonsalez is a 33 year old female  here for a postpartum visit.  She had a  on 2023 delivering a healthy baby girl weighing 7 lbs 5 oz at term.      Today's Depression Rating was 1    delivery complications:  No  breast feeding:  Yes, going well  bladder problems:  Yes, with sneeze  bowel problems/hemorrhoids:  No  episiotomy/laceration/incision healed? Yes  vaginal flow:  None  [place]:  Yes, condoms (then vasectomy?)  contraception:  condoms  emotional adjustment:  doing well and happy  back to work:  4/3/2023    OBJECTIVE:  Blood pressure 120/69, pulse 92, weight 76.4 kg (168 lb 8 oz), last menstrual period 2022, SpO2 98 %, currently breastfeeding.   General - pleasant female in no acute distress.  Breast - deferred.  Abdomen - soft, nontender, nondistended, no hepatosplenomegaly.  Pelvic - EG: normal adult female, BUS: within normal limits, Vagina: well rugated, no discharge, Cervix: no lesions or CMT, Uterus: firm, normal sized and nontender, Adnexae: no masses or tenderness.  Rectovaginal - deferred.    ASSESSMENT:  normal postpartum exam    PLAN:  Discussed kegel exercises --moderate tone  May resume normal activities without restrictions  Pap smear was not  done today    The patient will use condoms for birth control. Full counseling was provided, and all questions answered. Compliance is strongly emphasized.  Return to clinic in one year for an annual or PRN.    Discussed PT for pelvic floor if incontinence doesn't resolve and then could also see urology for outpatient sling PRN.     RICK GUILLAUME MD

## 2023-04-03 ENCOUNTER — TELEPHONE (OUTPATIENT)
Dept: FAMILY MEDICINE | Facility: CLINIC | Age: 33
End: 2023-04-03
Payer: COMMERCIAL

## 2023-04-03 ENCOUNTER — MEDICAL CORRESPONDENCE (OUTPATIENT)
Dept: HEALTH INFORMATION MANAGEMENT | Facility: CLINIC | Age: 33
End: 2023-04-03
Payer: COMMERCIAL

## 2023-04-03 NOTE — TELEPHONE ENCOUNTER
HERE FOR WELL CHILD CHECK FOR RODRIGO VELAZCO      [place]  Depression Scale (EPDS) Risk Assessment: Completed [place] SCORE 0

## 2023-11-10 ENCOUNTER — OFFICE VISIT (OUTPATIENT)
Dept: FAMILY MEDICINE | Facility: CLINIC | Age: 33
End: 2023-11-10
Payer: COMMERCIAL

## 2023-11-10 VITALS
OXYGEN SATURATION: 99 % | WEIGHT: 145.4 LBS | DIASTOLIC BLOOD PRESSURE: 74 MMHG | SYSTOLIC BLOOD PRESSURE: 119 MMHG | HEIGHT: 65 IN | TEMPERATURE: 97.6 F | HEART RATE: 78 BPM | BODY MASS INDEX: 24.22 KG/M2

## 2023-11-10 DIAGNOSIS — M25.552 HIP PAIN, LEFT: Primary | ICD-10-CM

## 2023-11-10 DIAGNOSIS — M53.3 PAIN OF LEFT SACROILIAC JOINT: ICD-10-CM

## 2023-11-10 PROCEDURE — 90471 IMMUNIZATION ADMIN: CPT | Performed by: PHYSICIAN ASSISTANT

## 2023-11-10 PROCEDURE — 90480 ADMN SARSCOV2 VAC 1/ONLY CMP: CPT | Performed by: PHYSICIAN ASSISTANT

## 2023-11-10 PROCEDURE — 90686 IIV4 VACC NO PRSV 0.5 ML IM: CPT | Performed by: PHYSICIAN ASSISTANT

## 2023-11-10 PROCEDURE — 99213 OFFICE O/P EST LOW 20 MIN: CPT | Mod: 25 | Performed by: PHYSICIAN ASSISTANT

## 2023-11-10 PROCEDURE — 91320 SARSCV2 VAC 30MCG TRS-SUC IM: CPT | Performed by: PHYSICIAN ASSISTANT

## 2023-11-10 ASSESSMENT — ENCOUNTER SYMPTOMS: BACK PAIN: 1

## 2023-11-10 NOTE — PROGRESS NOTES
"  Assessment & Plan     Hip pain, left  Pain of left sacroiliac joint  Suggest starting physical therapy. Patient is agreeable to this plan and she has no additional questions. Return if not improving.   - Physical Therapy Referral; Future                 FITO [facility] MARIO Bai is a 33 year old, presenting for the following health issues:  Back Pain (Having lower left back pain x 2 months)        11/10/2023     8:46 AM   Additional Questions   Roomed by An V.         11/10/2023     8:46 AM   Patient Reported Additional Medications   Patient reports taking the following new medications None       History of Present Illness       Back Pain:  She presents for follow up of back pain. Patient's back pain is a recurring problem.  Location of back pain:  Left lower back, left middle of back and left hip  Description of back pain: dull ache and stabbing  Back pain spreads: left thigh, left knee, left shoulder and left side of neck    Since patient first noticed back pain, pain is: always present, but gets better and worse  Does back pain interfere with her job:  Yes       She eats 2-3 servings of fruits and vegetables daily.She consumes 0 sweetened beverage(s) daily.She exercises with enough effort to increase her heart rate 30 to 60 minutes per day.  She exercises with enough effort to increase her heart rate 4 days per week.   She is taking medications regularly.       In 2019, had similar pain. Had difficulty walking at that time.     Has had this back pain for 3 months. Has a baby (9 months) and a toddler. Pain with bending forward. Gets better with rest.   Goes to the chiropractor twice a week - helps for a day. Having adjustments.                 Review of Systems   Musculoskeletal:  Positive for back pain.            Objective    /74   Pulse 78   Temp 97.6  F (36.4  C) (Oral)   Ht 1.656 m (5' 5.2\")   Wt 66 kg (145 lb 6.4 oz)   LMP 10/27/2023   SpO2 " 99%   BMI 24.05 kg/m    Body mass index is 24.05 kg/m .  Physical Exam   GENERAL: healthy, alert and no distress  BACK: no CVA tenderness, no paralumbar tenderness. Left sided sacroiliac pain, Left TFL pain. Left hip ROM WNL. Lumbar spine ROM WNL. Strength 5/5

## 2023-11-14 ENCOUNTER — THERAPY VISIT (OUTPATIENT)
Dept: PHYSICAL THERAPY | Facility: CLINIC | Age: 33
End: 2023-11-14
Attending: PHYSICIAN ASSISTANT
Payer: COMMERCIAL

## 2023-11-14 DIAGNOSIS — G89.29 CHRONIC LEFT-SIDED LOW BACK PAIN WITHOUT SCIATICA: Primary | ICD-10-CM

## 2023-11-14 DIAGNOSIS — M54.50 CHRONIC LEFT-SIDED LOW BACK PAIN WITHOUT SCIATICA: Primary | ICD-10-CM

## 2023-11-14 DIAGNOSIS — M53.3 PAIN OF LEFT SACROILIAC JOINT: ICD-10-CM

## 2023-11-14 DIAGNOSIS — M25.552 HIP PAIN, LEFT: ICD-10-CM

## 2023-11-14 PROCEDURE — 97110 THERAPEUTIC EXERCISES: CPT | Mod: GP

## 2023-11-14 PROCEDURE — 97530 THERAPEUTIC ACTIVITIES: CPT | Mod: GP

## 2023-11-14 PROCEDURE — 97161 PT EVAL LOW COMPLEX 20 MIN: CPT | Mod: GP

## 2023-11-14 NOTE — PROGRESS NOTES
PHYSICAL THERAPY EVALUATION  Type of Visit: Evaluation    See electronic medical record for Abuse and Falls Screening details.    Subjective       Presenting condition or subjective complaint: Patient presents to outpatient therapy reporting persistent low back pain for at least 4 months. Reports the pain is always there, at best 1/10, on average 4/10, and goes up to 6/10. Pain is improved by the chiropractor, stretching, and IBuprogen. Back pain can lead to tension headaches which occurs on a 1x/per week basis. LBP may have stemmed from overuse workouts in 2019, followed by 2 pregnancies, and now is exacperated by lifting her 2 children (3 yo and 9 month old). 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 Sleeping typically exacerbates pain, but is more intermittent and dependent on if her children are in the bed. Patient reports left hip pain that comes on with her LBP, hip pain is on the lateral side, ariela, and fairly superficial.  Date of onset: 11/01/20    Relevant medical history: Currently pregnant or breastfeeding; Pain at night or rest   Dates & types of surgery:      Prior diagnostic imaging/testing results:       Prior therapy history for the same diagnosis, illness or injury: Yes 2019, PT    Prior Level of Function  Transfers:   Ambulation:   ADL:   IADL:     Living Environment  Social support: With a significant other or spouse   Type of home: House; 2-story   Stairs to enter the home: Yes 5 Is there a railing: No   Ramp: No   Stairs inside the home: Yes 8 (2 flights) Is there a railing: Yes   Help at home:    Equipment owned:       Employment: Yes information technology  Hobbies/Interests: writing and climbing    Patient goals for therapy: would like to get back to lifting and performing activities without back pain    Pain assessment:      Objective   LUMBAR SPINE  EVALUATION  PAIN:   INTEGUMENTARY (edema, incisions):   POSTURE: Standing Posture: flat back, reduced stance width  Sitting Posture: flat back and upright posture, guarded  GAIT:   Weightbearing Status: WBAT  Assistive Device(s): None  Gait Deviations:  right hip hike with left leg stance, rigid upper body, reduced arm swing on left  BALANCE/PROPRIOCEPTION:   WEIGHTBEARING ALIGNMENT:   NON-WEIGHTBEARING ALIGNMENT:    ROM:   PELVIC/SI SCREEN:   STRENGTH:     MYOTOMES:   DTR S:   CORD SIGNS:   DERMATOMES:   NEURAL TENSION:   FLEXIBILITY:   LUMBAR/HIP Special Tests:    PELVIS/SI SPECIAL TESTS:   FUNCTIONAL TESTS: Double Leg Squat: Improper use of glutes/hips, Impaired weight distribution, and shifts to right throughout squat. Reports pain at bottom of squat in low back  Standing forward flexion PSIS: no pain, normal sacral movement, reaches toes, pain with forward flexion  PALPATION:   SPINAL SEGMENTAL CONCLUSIONS:       Pain with forward flexion and L sidebend    MMT:   Hip flex L 3+/5  Knee flex L 4/5  Kneed ext L 5/5  Hip extension B 3/5, cannot achieve full ROM    Hip AROM extesion, significantly limited bilaterally. PROM WNL    Thigh thrust + B (pain on L)  Sacral thrust +  Sacral palpation -  Pelvic Compression -  Pelvic Distraction +  Pubic symphysis palpation -    Lumbar spine mobility: sensitivity to L5, normal mobility throughout lumbar region    Abdominal assessment: no TYESHA, pelvic rotation with SLR L>R, limited lower abdominal activation with core activation      Assessment & Plan   CLINICAL IMPRESSIONS  Medical Diagnosis: Hip pain, left  Pain of left sacroiliac joint.    Treatment Diagnosis: left hip pain, chronic low back pain without sciatica   Impression/Assessment: Patient is a 33 year old female with chronic low back pain, left hip pain and weakness, and decreased activity tolerance complaints.  The following significant findings have been identified: Pain, Decreased ROM/flexibility, Decreased joint  mobility, Decreased strength, Impaired gait, Impaired muscle performance, Decreased activity tolerance, and Impaired posture. These impairments interfere with their ability to perform work tasks, recreational activities, household chores, and community mobility as compared to previous level of function.     Clinical Decision Making (Complexity):  Clinical Presentation: Stable/Uncomplicated  Clinical Presentation Rationale: based on medical and personal factors listed in PT evaluation  Clinical Decision Making (Complexity): Low complexity    PLAN OF CARE  Treatment Interventions:  Modalities: Ultrasound  Interventions: Manual Therapy, Neuromuscular Re-education, Therapeutic Activity, Therapeutic Exercise    Long Term Goals     PT Goal 1  Goal Identifier: low back pain  Goal Description: Patient will report <1/10 low back pain with 10/10 trials of lifting 30 pounds from knee to waist height to reduce pain with lifting her children  Rationale: to maximize safety and independence with performance of ADLs and functional tasks;to maximize safety and independence within the home;to maximize safety and independence within the community;to maximize safety and independence with transportation  Target Date: 02/06/24  PT Goal 2  Goal Identifier: Left hip strength  Goal Description: Patient will improve left hip flexion to 5/5 strength to be able to run with her children  Rationale: to maximize safety and independence within the home;to maximize safety and independence within the community;to maximize safety and independence with performance of ADLs and functional tasks  Target Date: 02/06/24      Frequency of Treatment: 1x per week for 2 weeks, 1x per 2 weeks for 10 week adjusting frequency as indicated by patient presentation  Duration of Treatment: 12 weeks    Recommended Referrals to Other Professionals:   Education Assessment:   Learner/Method: Patient    Risks and benefits of evaluation/treatment have been explained.    Patient/Family/caregiver agrees with Plan of Care.     Evaluation Time:     PT Cirilo, Low Complexity Minutes (16212): 15       Signing Clinician: Amanda Castillo PT

## 2023-11-30 ENCOUNTER — THERAPY VISIT (OUTPATIENT)
Dept: PHYSICAL THERAPY | Facility: CLINIC | Age: 33
End: 2023-11-30
Payer: COMMERCIAL

## 2023-11-30 DIAGNOSIS — G89.29 CHRONIC LEFT-SIDED LOW BACK PAIN WITHOUT SCIATICA: ICD-10-CM

## 2023-11-30 DIAGNOSIS — M53.3 PAIN OF LEFT SACROILIAC JOINT: ICD-10-CM

## 2023-11-30 DIAGNOSIS — M54.50 CHRONIC LEFT-SIDED LOW BACK PAIN WITHOUT SCIATICA: ICD-10-CM

## 2023-11-30 DIAGNOSIS — M25.552 HIP PAIN, LEFT: Primary | ICD-10-CM

## 2023-11-30 PROCEDURE — 97110 THERAPEUTIC EXERCISES: CPT | Mod: GP

## 2023-12-11 ENCOUNTER — THERAPY VISIT (OUTPATIENT)
Dept: PHYSICAL THERAPY | Facility: CLINIC | Age: 33
End: 2023-12-11
Payer: COMMERCIAL

## 2023-12-11 DIAGNOSIS — M53.3 PAIN OF LEFT SACROILIAC JOINT: ICD-10-CM

## 2023-12-11 DIAGNOSIS — M54.50 CHRONIC LEFT-SIDED LOW BACK PAIN WITHOUT SCIATICA: ICD-10-CM

## 2023-12-11 DIAGNOSIS — M25.552 HIP PAIN, LEFT: Primary | ICD-10-CM

## 2023-12-11 DIAGNOSIS — G89.29 CHRONIC LEFT-SIDED LOW BACK PAIN WITHOUT SCIATICA: ICD-10-CM

## 2023-12-11 PROCEDURE — 97112 NEUROMUSCULAR REEDUCATION: CPT | Mod: GP

## 2023-12-11 PROCEDURE — 97110 THERAPEUTIC EXERCISES: CPT | Mod: GP

## 2024-01-04 ENCOUNTER — THERAPY VISIT (OUTPATIENT)
Dept: PHYSICAL THERAPY | Facility: CLINIC | Age: 34
End: 2024-01-04
Payer: COMMERCIAL

## 2024-01-04 DIAGNOSIS — M25.552 HIP PAIN, LEFT: Primary | ICD-10-CM

## 2024-01-04 DIAGNOSIS — M53.3 PAIN OF LEFT SACROILIAC JOINT: ICD-10-CM

## 2024-01-04 DIAGNOSIS — M54.50 CHRONIC LEFT-SIDED LOW BACK PAIN WITHOUT SCIATICA: ICD-10-CM

## 2024-01-04 DIAGNOSIS — G89.29 CHRONIC LEFT-SIDED LOW BACK PAIN WITHOUT SCIATICA: ICD-10-CM

## 2024-01-04 PROCEDURE — 97140 MANUAL THERAPY 1/> REGIONS: CPT | Mod: GP

## 2024-01-04 PROCEDURE — 97110 THERAPEUTIC EXERCISES: CPT | Mod: GP

## 2024-01-18 ENCOUNTER — THERAPY VISIT (OUTPATIENT)
Dept: PHYSICAL THERAPY | Facility: CLINIC | Age: 34
End: 2024-01-18
Payer: COMMERCIAL

## 2024-01-18 DIAGNOSIS — M54.50 CHRONIC LEFT-SIDED LOW BACK PAIN WITHOUT SCIATICA: ICD-10-CM

## 2024-01-18 DIAGNOSIS — G89.29 CHRONIC LEFT-SIDED LOW BACK PAIN WITHOUT SCIATICA: ICD-10-CM

## 2024-01-18 DIAGNOSIS — M25.552 HIP PAIN, LEFT: Primary | ICD-10-CM

## 2024-01-18 DIAGNOSIS — M53.3 PAIN OF LEFT SACROILIAC JOINT: ICD-10-CM

## 2024-01-18 PROCEDURE — 97110 THERAPEUTIC EXERCISES: CPT | Mod: GP

## 2024-01-18 PROCEDURE — 97140 MANUAL THERAPY 1/> REGIONS: CPT | Mod: GP

## 2024-01-27 ENCOUNTER — HEALTH MAINTENANCE LETTER (OUTPATIENT)
Age: 34
End: 2024-01-27

## 2024-02-01 ENCOUNTER — THERAPY VISIT (OUTPATIENT)
Dept: PHYSICAL THERAPY | Facility: CLINIC | Age: 34
End: 2024-02-01
Payer: COMMERCIAL

## 2024-02-01 DIAGNOSIS — M54.50 CHRONIC LEFT-SIDED LOW BACK PAIN WITHOUT SCIATICA: ICD-10-CM

## 2024-02-01 DIAGNOSIS — M25.552 HIP PAIN, LEFT: Primary | ICD-10-CM

## 2024-02-01 DIAGNOSIS — G89.29 CHRONIC LEFT-SIDED LOW BACK PAIN WITHOUT SCIATICA: ICD-10-CM

## 2024-02-01 DIAGNOSIS — M53.3 PAIN OF LEFT SACROILIAC JOINT: ICD-10-CM

## 2024-02-01 PROCEDURE — 97112 NEUROMUSCULAR REEDUCATION: CPT | Mod: GP

## 2024-02-01 PROCEDURE — 97110 THERAPEUTIC EXERCISES: CPT | Mod: GP

## 2024-02-01 PROCEDURE — 97530 THERAPEUTIC ACTIVITIES: CPT | Mod: GP

## 2024-02-02 NOTE — PROGRESS NOTES
DISCHARGE  Reason for Discharge: Patient has met all goals. Minimal pelvic and bilateral knee instability still present during advanced activities, patient plans to continue her HEP independently to address these remaining deficits.     Equipment Issued: none    Discharge Plan: Patient to continue home program.    Referring Provider:  Gricel Martinez       02/01/24 0500   Appointment Info   Signing clinician's name / credentials Amanda Castillo, PT, DPT   Total/Authorized Visits 10 per therapist   Visits Used 6   Medical Diagnosis Hip pain, left  Pain of left sacroiliac joint.   PT Tx Diagnosis left hip pain, chronic low back pain without sciatica   Progress Note/Certification   Onset of illness/injury or Date of Surgery 11/01/20   Therapy Frequency 1x per week for 2 weeks, 1x per 2 weeks for 10 week adjusting frequency as indicated by patient presentation   Predicted Duration 12 weeks   Progress Note Completed Date 11/14/23   PT Goal 1   Goal Identifier low back pain   Goal Description Patient will report <1/10 low back pain with 10/10 trials of lifting 30 pounds from knee to waist height to reduce pain with lifting her children   Rationale to maximize safety and independence with performance of ADLs and functional tasks;to maximize safety and independence within the home;to maximize safety and independence within the community;to maximize safety and independence with transportation   Goal Progress No back pain during session, reports no back pain with weight lifting   Target Date 02/06/24   Date Met 02/01/24   PT Goal 2   Goal Identifier Left hip strength   Goal Description Patient will improve left hip flexion to 5/5 strength to be able to run with her children   Rationale to maximize safety and independence within the home;to maximize safety and independence within the community;to maximize safety and independence with performance of ADLs and functional tasks   Target Date 02/06/24   Date Met 02/01/24    Subjective Report   Subjective Report Pt returns with no back pain throughout the day or with exercise. Has been continuing to progress weight lifting slowly and without pain. Continues to work through return to run program. Reviewed pt's physical performance test requirements and discussed training modifications to reduce risk of back injury.   Objective Measures   Objective Measures Objective Measure 1;Objective Measure 2;Objective Measure 3   Objective Measure 1   Objective Measure SL squat   Details Bilaterally: min instability, retains balance without UE assist   Objective Measure 2   Objective Measure Squat jump   Details x5, good knee and hip stability, maintains neutral spine   Objective Measure 3   Objective Measure Prone instability test   Details Positive   Treatment Interventions (PT)   Interventions Therapeutic Procedure/Exercise;Therapeutic Activity;Neuromuscular Re-education;Manual Therapy   Therapeutic Procedure/Exercise   Therapeutic Procedures: strength, endurance, ROM, flexibillity minutes (93595) 19   Therapeutic Procedures Ther Proc 2;Ther Proc 3;Ther Proc 4;Ther Proc 5   Ther Proc 1 Weighted walking pull   Ther Proc 1 - Details Pt walks backward holding BTB with PT adding manual resistance, 30 feet x3, VC core activation d/t min increased lordosis   Ther Proc 4 Prone press ups   Ther Proc 4 - Details x10   Ther Proc 5 Super man   Ther Proc 5 - Details 3x8, first sets total body, last set upper body only   PTRx Ther Proc 1 Abdominal Brace Transverse Abdominis   PTRx Ther Proc 1 - Details x8 as core warm up   PTRx Ther Proc 2 Supine Abdominal Exercise #8 (Toe Taps)   PTRx Ther Proc 2 - Details HEP   PTRx Ther Proc 3 Double Knee Lifts   PTRx Ther Proc 3 - Details HEP   PTRx Ther Proc 4 All 4s Cat Cow   PTRx Ther Proc 4 - Details HEP   PTRx Ther Proc 5 birddog   PTRx Ther Proc 5 - Details HEP   PTRx Ther Proc 6 Education Sheet General   PTRx Ther Proc 6 - Details HEP good mornings   PTRx Ther  "Proc 7 Side Stepping With Theraband   PTRx Ther Proc 7 - Details HEP verbal review, BTB   PTRx Ther Proc 8 Fire Hydrant   PTRx Ther Proc 8 - Details HEP   PTRx Ther Proc 9 Trunk Rotation Stretch   PTRx Ther Proc 9 - Details HEP   PTRx Ther Proc 10 Thread the Needle   PTRx Ther Proc 10 - Details HEP   PTRx Ther Proc 11 Kneeling Hip Flexor Stretch   PTRx Ther Proc 11 - Details HEP, VR   PTRx Ther Proc 12 Hip Flexion Straight Leg Raise   PTRx Ther Proc 12 - Details HEP, VR   PTRx Ther Proc 13 Single Leg Step Squat with Dumbbells   PTRx Ther Proc 13 - Details HEP   PTRx Ther Proc 14 Dumbbell Russian Dead Lifts - Single Leg   PTRx Ther Proc 14 - Details HEP   PTRx Ther Proc 15 Bridging On Ball With Hamstring Curls (Large Ball)   PTRx Ther Proc 15 - Details HEP   Skilled Intervention VC/TC for form and posture, modifications as indicated including progressions and regressions   Patient Response/Progress Improved core activation throughout activities, hip/knee stability with high level activities   Therapeutic Activity   Therapeutic Activities: dynamic activities to improve functional performance minutes (97302) 8   Ther Act 1 Review of physical assessment requirements for work   Ther Act 1 - Details Recommended continuation of weight lifting to work up to required weights, modification of overhead backward throw discussed as well as strengthening deep back stabilizers   PTRx Ther Act 1 Return to Run Protocol   PTRx Ther Act 1 - Details HEP, VR   Patient Response/Progress good understanding   Neuromuscular Re-education   Neuro Re-ed 2 Knee Single Leg Squat   Neuro Re-ed 2 - Details 2x6 bilaterally on 9\" step   Neuromuscular re-ed of mvmt, balance, coord, kinesthetic sense, posture, proprioception minutes (69008) 13   PTRx Neuro Re-ed 1 Single leg balance on a Pillow - Right Foot   PTRx Neuro Re-ed 1 - Details HEP. 60 second balance on blue side of bosu bilatearally in clinic   PTRx Neuro Re-ed 2 Closed Kinetic Chain Upper " "Extremity Band Walk   PTRx Neuro Re-ed 2 - Details Modified high planks to challenge core including lateral walks, up and down a 4\" box   Skilled Intervention demonstration, VC/TC for core stabilization and form   Patient Response/Progress Tolerated well, pt has improved knee stability since previous session with continued minimal instability through B knees and pelvis   Manual Therapy   Manual Therapy 1 Hip mobs   Manual Therapy 1 - Details not this date   Education   Learner/Method Patient   Plan   Home program PTRx   Updates to plan of care Patient is satisfied with progress made through his episode of care, would like to discharge on this date and continue her HEP independently   Total Session Time   Timed Code Treatment Minutes 40   Total Treatment Time (sum of timed and untimed services) 40     "

## 2024-02-08 ENCOUNTER — OFFICE VISIT (OUTPATIENT)
Dept: FAMILY MEDICINE | Facility: CLINIC | Age: 34
End: 2024-02-08
Payer: COMMERCIAL

## 2024-02-08 VITALS
RESPIRATION RATE: 16 BRPM | OXYGEN SATURATION: 98 % | WEIGHT: 147.2 LBS | BODY MASS INDEX: 24.53 KG/M2 | SYSTOLIC BLOOD PRESSURE: 118 MMHG | DIASTOLIC BLOOD PRESSURE: 76 MMHG | TEMPERATURE: 97.6 F | HEART RATE: 82 BPM | HEIGHT: 65 IN

## 2024-02-08 DIAGNOSIS — G89.29 CHRONIC LEFT-SIDED LOW BACK PAIN WITHOUT SCIATICA: ICD-10-CM

## 2024-02-08 DIAGNOSIS — Z00.00 ROUTINE GENERAL MEDICAL EXAMINATION AT A HEALTH CARE FACILITY: Primary | ICD-10-CM

## 2024-02-08 DIAGNOSIS — M54.50 CHRONIC LEFT-SIDED LOW BACK PAIN WITHOUT SCIATICA: ICD-10-CM

## 2024-02-08 DIAGNOSIS — F43.23 ADJUSTMENT DISORDER WITH MIXED ANXIETY AND DEPRESSED MOOD: ICD-10-CM

## 2024-02-08 PROBLEM — M53.3 PAIN OF LEFT SACROILIAC JOINT: Status: RESOLVED | Noted: 2023-11-14 | Resolved: 2024-02-08

## 2024-02-08 PROBLEM — H52.209 ASTIGMATISM: Status: RESOLVED | Noted: 2021-10-29 | Resolved: 2024-02-08

## 2024-02-08 PROBLEM — N91.1 SECONDARY AMENORRHEA: Status: RESOLVED | Noted: 2018-12-28 | Resolved: 2024-02-08

## 2024-02-08 PROBLEM — M25.571 CHRONIC PAIN OF RIGHT ANKLE: Status: ACTIVE | Noted: 2024-02-08

## 2024-02-08 PROBLEM — M25.552 HIP PAIN, LEFT: Status: ACTIVE | Noted: 2023-11-14

## 2024-02-08 PROBLEM — M25.512 CHRONIC LEFT SHOULDER PAIN: Status: ACTIVE | Noted: 2024-02-08

## 2024-02-08 PROBLEM — Z02.89 HEALTH EXAMINATION OF DEFINED SUBPOPULATION: Status: RESOLVED | Noted: 2021-10-29 | Resolved: 2024-02-08

## 2024-02-08 PROBLEM — H52.10 MYOPIA: Status: RESOLVED | Noted: 2021-10-29 | Resolved: 2024-02-08

## 2024-02-08 PROBLEM — L63.0 ALOPECIA AREATA TOTALIS: Status: ACTIVE | Noted: 2018-09-18

## 2024-02-08 LAB
HIV 1+2 AB+HIV1 P24 AG SERPL QL IA: NONREACTIVE
T PALLIDUM AB SER QL: NONREACTIVE

## 2024-02-08 PROCEDURE — 87389 HIV-1 AG W/HIV-1&-2 AB AG IA: CPT | Performed by: FAMILY MEDICINE

## 2024-02-08 PROCEDURE — 99213 OFFICE O/P EST LOW 20 MIN: CPT | Mod: 25 | Performed by: FAMILY MEDICINE

## 2024-02-08 PROCEDURE — 87491 CHLMYD TRACH DNA AMP PROBE: CPT | Performed by: FAMILY MEDICINE

## 2024-02-08 PROCEDURE — 99395 PREV VISIT EST AGE 18-39: CPT | Performed by: FAMILY MEDICINE

## 2024-02-08 PROCEDURE — 36415 COLL VENOUS BLD VENIPUNCTURE: CPT | Performed by: FAMILY MEDICINE

## 2024-02-08 PROCEDURE — 87591 N.GONORRHOEAE DNA AMP PROB: CPT | Performed by: FAMILY MEDICINE

## 2024-02-08 PROCEDURE — 86780 TREPONEMA PALLIDUM: CPT | Performed by: FAMILY MEDICINE

## 2024-02-08 RX ORDER — ALBUTEROL SULFATE 90 UG/1
1-2 AEROSOL, METERED RESPIRATORY (INHALATION) EVERY 6 HOURS PRN
Qty: 8.5 G | Refills: 1 | Status: SHIPPED | OUTPATIENT
Start: 2024-02-08

## 2024-02-08 SDOH — HEALTH STABILITY: PHYSICAL HEALTH: ON AVERAGE, HOW MANY MINUTES DO YOU ENGAGE IN EXERCISE AT THIS LEVEL?: 50 MIN

## 2024-02-08 SDOH — HEALTH STABILITY: PHYSICAL HEALTH: ON AVERAGE, HOW MANY DAYS PER WEEK DO YOU ENGAGE IN MODERATE TO STRENUOUS EXERCISE (LIKE A BRISK WALK)?: 3 DAYS

## 2024-02-08 ASSESSMENT — PAIN SCALES - GENERAL: PAINLEVEL: MILD PAIN (2)

## 2024-02-08 ASSESSMENT — SOCIAL DETERMINANTS OF HEALTH (SDOH): HOW OFTEN DO YOU GET TOGETHER WITH FRIENDS OR RELATIVES?: TWICE A WEEK

## 2024-02-08 NOTE — PROGRESS NOTES
Preventive Care Visit  [facility]  Ann Mittal MD, Family Medicine  Feb 8, 2024    Assessment & Plan     Bhumika was seen today for physical.    Diagnoses and all orders for this visit:    Routine general medical examination at a health care facility  Comments:  Due for pap today.  Refilled albuterol which she only uses after she drinks.  Up to date on shots including influenza; updated.  Follow up yearly  Orders:  -     albuterol (PROAIR HFA/PROVENTIL HFA/VENTOLIN HFA) 108 (90 Base) MCG/ACT inhaler; Inhale 1-2 puffs into the lungs every 6 hours as needed for shortness of breath or wheezing  -     HIV Antigen Antibody Combo Cascade; Future  -     Treponema Abs w Reflex to RPR and Titer; Future  -     Neisseria gonorrhoeae PCR; Future  -     Chlamydia trachomatis PCR; Future    Chronic left-sided low back pain without sciatica  Comments:  started in 2019.  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 Persists on and off, currently in physical therapy again.  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  Referred for therapy.  Orders:  -     Adult Mental Health  Referral; Future    Other orders  -     REVIEW OF HEALTH MAINTENANCE PROTOCOL ORDERS  -     PRIMARY CARE FOLLOW-UP SCHEDULING; Future          Counseling  Appropriate preventive services were discussed with this patient, including applicable screening as appropriate for fall prevention, nutrition, physical activity, Tobacco-use cessation, weight loss and cognition.  Checklist reviewing preventive services available has been given to the patient.  Reviewed patient's diet, addressing concerns and/or questions.   She is at risk for lack of exercise and has been provided with information to increase physical activity for the benefit of her well-being.         Subjective    Bhumika is a 33 year old, presenting for the following:  Physical        2/8/2024     9:38 AM   Additional Questions   Roomed by Gabrielle   Accompanied by Self         2/8/2024     9:38 AM   Patient Reported Additional Medications   Patient reports taking the following new medications None        Health Care Directive  Patient does not have a Health Care Directive or Living Will: Discussed advance care planning with patient; information given to patient to review.    HPI        2/8/2024   General Health   How would you rate your overall physical health? Good   Feel stress (tense, anxious, or unable to sleep) To some extent - [employer]   (!) STRESS CONCERN      2/8/2024   Nutrition   Three or more servings of calcium each day? Yes   Diet: Regular (no restrictions)   How many servings of fruit and vegetables per day? (!) 2-3   How many sweetened beverages each day? 0-1         2/8/2024   Exercise   Days per week of moderate/strenous exercise 3 days   Average minutes spent exercising at this level 50 min         2/8/2024   Social Factors   Frequency of gathering with friends or relatives Twice a week   Worry food won't last until get money to buy more No   Food not last or not have enough money for food? No   Do you have housing?  Yes   Are you worried about losing your housing? No   Lack of transportation? No   Unable to get utilities (heat,electricity)? No         2/8/2024   Dental   Dentist two times every year? Yes         2/8/2024   TB Screening   Were you born outside of [place]?  No         Today's PHQ-2 Score:       2/8/2024     9:34 AM   PHQ-2 ( 1999 [employer])   Q1: Little interest or pleasure in doing things 0   Q2: Feeling down, depressed or hopeless 1   PHQ-2 Score 1   Q1: Little interest or pleasure in doing things Not at all   Q2: Feeling down, depressed or hopeless Several days   PHQ-2 Score 1           2/8/2024   Substance Use   Alcohol more than 3/day or more than 7/wk No   Do you use any other  "substances recreationally? No     Social History     Tobacco Use    Smoking status: Never     Passive exposure: Never    Smokeless tobacco: Never   Vaping Use    Vaping Use: Never used   Substance Use Topics    Alcohol use: Not Currently    Drug use: No             2/8/2024   Breast Cancer Screening   Family history of breast, colon, or ovarian cancer? No / Unknown              2/8/2024   STI Screening   New sexual partner(s) since last STI/HIV test? (!) YES      History of abnormal Pap smear:         10/29/2021     4:04 PM 9/18/2018    10:10 AM   PAP / HPV   PAP Negative for Intraepithelial Lesion or Malignancy (NILM)     PAP (Historical)  NIL            2/8/2024   Contraception/Family Planning   Questions about contraception or family planning No        Reviewed and updated as needed this visit by Provider   Tobacco  Allergies  Meds  Problems  Med Hx  Surg Hx  Fam Hx              Review of Systems       Objective    Exam  /76 (BP Location: Right arm, Patient Position: Sitting, Cuff Size: Adult Regular)   Pulse 82   Temp 97.6  F (36.4  C) (Temporal)   Resp 16   Ht 1.655 m (5' 5.16\")   Wt 66.8 kg (147 lb 3.2 oz)   LMP 01/01/2024 (Approximate)   SpO2 98%   Breastfeeding No   BMI 24.38 kg/m     Estimated body mass index is 24.38 kg/m  as calculated from the following:    Height as of this encounter: 1.655 m (5' 5.16\").    Weight as of this encounter: 66.8 kg (147 lb 3.2 oz).    Physical Exam  GENERAL: alert and no distress  EYES: Eyes grossly normal to inspection, PERRL and conjunctivae and sclerae normal  HENT: ear canals and TM's normal, nose and mouth without ulcers or lesions  NECK: no adenopathy, no asymmetry, masses, or scars  RESP: lungs clear to auscultation - no rales, rhonchi or wheezes  CV: regular rate and rhythm, normal S1 S2, no S3 or S4, no murmur, click or rub, no peripheral edema  MS: no gross musculoskeletal defects noted, no edema  SKIN: no suspicious lesions or rashes  NEURO: " Normal strength and tone, mentation intact and speech normal  PSYCH: mentation appears normal, affect normal/bright      Signed Electronically by: Ann Mittal MD

## 2024-02-08 NOTE — PATIENT INSTRUCTIONS
Preventive Care Advice   This is general advice given by our system to help you stay healthy. However, your care team may have specific advice just for you. Please talk to your care team about your preventive care needs.  Nutrition  Eat 5 or more servings of fruits and vegetables each day.  Try wheat bread, brown rice and whole grain pasta (instead of white bread, rice, and pasta).  Get enough calcium and vitamin D. Check the label on foods and aim for 100% of the RDA (recommended daily allowance).  Lifestyle  Exercise at least 150 minutes each week  (30 minutes a day, 5 days a week).  Do muscle strengthening activities 2 days a week. These help control your weight and prevent disease.  No smoking.  Wear sunscreen to prevent skin cancer.  Have a dental exam and cleaning every 6 months.  Yearly exams  See your health care team every year to talk about:  Any changes in your health.  Any medicines your care team has prescribed.  Preventive care, family planning, and ways to prevent chronic diseases.  Shots (vaccines)   HPV shots (up to age 26), if you've never had them before.  Hepatitis B shots (up to age 59), if you've never had them before.  COVID-19 shot: Get this shot when it's due.  Flu shot: Get a flu shot every year.  Tetanus shot: Get a tetanus shot every 10 years.  Pneumococcal, hepatitis A, and RSV shots: Ask your care team if you need these based on your risk.  Shingles shot (for age 50 and up)  General health tests  Diabetes screening:  Starting at age 35, Get screened for diabetes at least every 3 years.  If you are younger than age 35, ask your care team if you should be screened for diabetes.  Cholesterol test: At age 39, start having a cholesterol test every 5 years, or more often if advised.  Bone density scan (DEXA): At age 50, ask your care team if you should have this scan for osteoporosis (brittle bones).  Hepatitis C: Get tested at least once in your life.  STIs (sexually transmitted  infections)  Before age 24: Ask your care team if you should be screened for STIs.  After age 24: Get screened for STIs if you're at risk. You are at risk for STIs (including HIV) if:  You are sexually active with more than one person.  You don't use condoms every time.  You or a partner was diagnosed with a sexually transmitted infection.  If you are at risk for HIV, ask about PrEP medicine to prevent HIV.  Get tested for HIV at least once in your life, whether you are at risk for HIV or not.  Cancer screening tests  Cervical cancer screening: If you have a cervix, begin getting regular cervical cancer screening tests starting at age 21.  Breast cancer scan (mammogram): If you've ever had breasts, begin having regular mammograms starting at age 40. This is a scan to check for breast cancer.  Colon cancer screening: It is important to start screening for colon cancer at age 45.  Have a colonoscopy test every 10 years (or more often if you're at risk) Or, ask your provider about stool tests like a FIT test every year or Cologuard test every 3 years.  To learn more about your testing options, visit:   [email or web address].  For help making a decision, visit:   https://bit.ly/wj72648.  Prostate cancer screening test: If you have a prostate, ask your care team if a prostate cancer screening test (PSA) at age 55 is right for you.  Lung cancer screening: If you are a current or former smoker ages 50 to 80, ask your care team if ongoing lung cancer screenings are right for you.  For informational purposes only. Not to replace the advice of your health care provider. Copyright   2023 [employer]. All rights reserved. Clinically reviewed by the [facility] Transitions Program. [employer] 901164 - REV 01/24.

## 2024-02-09 PROBLEM — H93.11 TINNITUS, RIGHT: Status: ACTIVE | Noted: 2024-02-09

## 2024-02-09 LAB
C TRACH DNA SPEC QL NAA+PROBE: NEGATIVE
N GONORRHOEA DNA SPEC QL NAA+PROBE: NEGATIVE

## 2024-02-16 ENCOUNTER — DOCUMENTATION ONLY (OUTPATIENT)
Dept: OTHER | Facility: CLINIC | Age: 34
End: 2024-02-16
Payer: COMMERCIAL

## 2024-04-16 ENCOUNTER — MYC MEDICAL ADVICE (OUTPATIENT)
Dept: FAMILY MEDICINE | Facility: CLINIC | Age: 34
End: 2024-04-16
Payer: COMMERCIAL

## 2024-04-17 ENCOUNTER — MYC MEDICAL ADVICE (OUTPATIENT)
Dept: FAMILY MEDICINE | Facility: CLINIC | Age: 34
End: 2024-04-17
Payer: COMMERCIAL

## 2024-04-17 NOTE — TELEPHONE ENCOUNTER
Writer responded via [employer].  PATTY NicholsN, RN-BC  [facility]

## 2024-05-15 ENCOUNTER — MYC MEDICAL ADVICE (OUTPATIENT)
Dept: FAMILY MEDICINE | Facility: CLINIC | Age: 34
End: 2024-05-15
Payer: COMMERCIAL

## 2024-05-15 NOTE — TELEPHONE ENCOUNTER
DR Mittal,    Do you want her to do e visit for this? She writes:       I misassessed when I thought 90 days would be enough for my back to be back up to snuff. Can you extend this out 6 months? I attached the blank form and the form we filled out in February. It should all be the same. Let me know if you need anything else, or if I need to schedule an appointment for this.

## 2024-05-15 NOTE — TELEPHONE ENCOUNTER
I can do this without another visit, particularly as the other e-visit has been discontinued.    Team could you print these out and complete what you can and then put my inbox for me to finish up?

## 2024-06-05 ENCOUNTER — MYC MEDICAL ADVICE (OUTPATIENT)
Dept: FAMILY MEDICINE | Facility: CLINIC | Age: 34
End: 2024-06-05
Payer: COMMERCIAL

## 2024-06-05 DIAGNOSIS — M26.609 TMJ (TEMPOROMANDIBULAR JOINT SYNDROME): Primary | ICD-10-CM

## 2024-06-06 PROBLEM — M26.609 TMJ (TEMPOROMANDIBULAR JOINT SYNDROME): Status: ACTIVE | Noted: 2024-06-06

## 2024-06-06 NOTE — TELEPHONE ENCOUNTER
Dr. Mittal-    Are you ok with adding a jaw click that started in 2016 (I'm assuming she's asking this to be on her diagnosis list?)    I dont see this was discussed in 2/8 office visit, but it looks like she had requested you add tinnitus to her list afterwards via myChart visit which was added.    JAHAIRA Whitley RN  [facility]

## 2024-06-10 ENCOUNTER — MYC MEDICAL ADVICE (OUTPATIENT)
Dept: FAMILY MEDICINE | Facility: CLINIC | Age: 34
End: 2024-06-10

## 2024-06-10 ENCOUNTER — ANCILLARY PROCEDURE (OUTPATIENT)
Dept: GENERAL RADIOLOGY | Facility: CLINIC | Age: 34
End: 2024-06-10
Attending: FAMILY MEDICINE
Payer: COMMERCIAL

## 2024-06-10 ENCOUNTER — OFFICE VISIT (OUTPATIENT)
Dept: FAMILY MEDICINE | Facility: CLINIC | Age: 34
End: 2024-06-10
Payer: COMMERCIAL

## 2024-06-10 VITALS
SYSTOLIC BLOOD PRESSURE: 110 MMHG | HEIGHT: 65 IN | DIASTOLIC BLOOD PRESSURE: 63 MMHG | OXYGEN SATURATION: 100 % | RESPIRATION RATE: 15 BRPM | HEART RATE: 70 BPM | TEMPERATURE: 98.6 F | WEIGHT: 155.2 LBS | BODY MASS INDEX: 25.86 KG/M2

## 2024-06-10 DIAGNOSIS — M25.562 ACUTE PAIN OF LEFT KNEE: ICD-10-CM

## 2024-06-10 DIAGNOSIS — M54.50 CHRONIC LEFT-SIDED LOW BACK PAIN WITHOUT SCIATICA: ICD-10-CM

## 2024-06-10 DIAGNOSIS — G89.29 CHRONIC LEFT-SIDED LOW BACK PAIN WITHOUT SCIATICA: ICD-10-CM

## 2024-06-10 DIAGNOSIS — M25.562 ACUTE PAIN OF LEFT KNEE: Primary | ICD-10-CM

## 2024-06-10 DIAGNOSIS — M25.552 HIP PAIN, LEFT: ICD-10-CM

## 2024-06-10 PROCEDURE — 99215 OFFICE O/P EST HI 40 MIN: CPT | Performed by: FAMILY MEDICINE

## 2024-06-10 PROCEDURE — 73562 X-RAY EXAM OF KNEE 3: CPT | Mod: TC | Performed by: RADIOLOGY

## 2024-06-10 ASSESSMENT — ANXIETY QUESTIONNAIRES
8. IF YOU CHECKED OFF ANY PROBLEMS, HOW DIFFICULT HAVE THESE MADE IT FOR YOU TO DO YOUR WORK, TAKE CARE OF THINGS AT HOME, OR GET ALONG WITH OTHER PEOPLE?: NOT DIFFICULT AT ALL
7. FEELING AFRAID AS IF SOMETHING AWFUL MIGHT HAPPEN: NOT AT ALL
3. WORRYING TOO MUCH ABOUT DIFFERENT THINGS: SEVERAL DAYS
IF YOU CHECKED OFF ANY PROBLEMS ON THIS QUESTIONNAIRE, HOW DIFFICULT HAVE THESE PROBLEMS MADE IT FOR YOU TO DO YOUR WORK, TAKE CARE OF THINGS AT HOME, OR GET ALONG WITH OTHER PEOPLE: NOT DIFFICULT AT ALL
4. TROUBLE RELAXING: SEVERAL DAYS
GAD7 TOTAL SCORE: 3
6. BECOMING EASILY ANNOYED OR IRRITABLE: NOT AT ALL
2. NOT BEING ABLE TO STOP OR CONTROL WORRYING: NOT AT ALL
GAD7 TOTAL SCORE: 3
1. FEELING NERVOUS, ANXIOUS, OR ON EDGE: SEVERAL DAYS
GAD7 TOTAL SCORE: 3
5. BEING SO RESTLESS THAT IT IS HARD TO SIT STILL: NOT AT ALL
7. FEELING AFRAID AS IF SOMETHING AWFUL MIGHT HAPPEN: NOT AT ALL

## 2024-06-10 ASSESSMENT — PAIN SCALES - PAIN ENJOYMENT GENERAL ACTIVITY SCALE (PEG)
INTERFERED_ENJOYMENT_LIFE: 4
AVG_PAIN_PASTWEEK: 5
PEG_TOTALSCORE: 4.33
INTERFERED_GENERAL_ACTIVITY: 4
INTERFERED_GENERAL_ACTIVITY: 4
INTERFERED_ENJOYMENT_LIFE: 4
AVG_PAIN_PASTWEEK: 5
PEG_TOTALSCORE: 4.33

## 2024-06-10 ASSESSMENT — PAIN SCALES - GENERAL: PAINLEVEL: MILD PAIN (3)

## 2024-06-10 ASSESSMENT — PATIENT HEALTH QUESTIONNAIRE - PHQ9
SUM OF ALL RESPONSES TO PHQ QUESTIONS 1-9: 4
SUM OF ALL RESPONSES TO PHQ QUESTIONS 1-9: 4
10. IF YOU CHECKED OFF ANY PROBLEMS, HOW DIFFICULT HAVE THESE PROBLEMS MADE IT FOR YOU TO DO YOUR WORK, TAKE CARE OF THINGS AT HOME, OR GET ALONG WITH OTHER PEOPLE: NOT DIFFICULT AT ALL

## 2024-06-10 NOTE — PROGRESS NOTES
Assessment & Plan     Acute pain of left knee  I recommended checking x-rays today.  I personally reviewed the images with her and provided an initial interpretation.  No acute fractures, alignment problems or degenerative changes seen.  The formal radiology report describes a small left knee joint effusion, but otherwise normal/negative.  I recommended physical therapy and she was given a referral to see an orthopedic knee specialist.  She would likely benefit from core strengthening exercises, some of these she is already working on through PT for the back and left hip.  - XR Knee Bilateral 3 Views; Future  - Physical Therapy  Referral; Future  - Orthopedic  Referral; Future    Chronic left-sided low back pain without sciatica  I recommended she continue working on the physical therapy exercises.    Hip pain, left  I recommended she continue working on the PT exercises for this.          40 minutes spent by me on the date of the encounter doing chart review, review of test results, interpretation of tests, patient visit, and documentation         Subjective   Bhumika is a 34 year old, presenting for the following health issues:  Knee Pain        6/10/2024    11:12 AM   Additional Questions   Roomed by Daniel SÁNCHEZ     History of Present Illness       Reason for visit:  Knee pain  Symptom onset:  1-3 days ago  Symptoms include:  Left knee pain and swelling  Symptom intensity:  Moderate  Symptom progression:  Staying the same  Had these symptoms before:  Yes  Has tried/received treatment for these symptoms:  No  What makes it better:  Ice    She eats 4 or more servings of fruits and vegetables daily.She consumes 0 sweetened beverage(s) daily.She exercises with enough effort to increase her heart rate 30 to 60 minutes per day.  She exercises with enough effort to increase her heart rate 4 days per week.   She is taking medications regularly.         Pain History:  When did you first notice your pain?  "06/09   Have you seen anyone else for your pain? No  How has your pain affected your ability to work? Not applicable  Where in your body do you have pain? Musculoskeletal problem/pain  Onset/Duration: 06/09  Description  Location: knee - left  Joint Swelling: YES- mild, localized in the lateral, lower area of the left knee  Redness: No  Pain: YES  Warmth: No  Intensity:  moderate  Progression of Symptoms:  worsening and waxing and waning  Accompanying signs and symptoms:   Fevers: No  Numbness/tingling/weakness: YES  History  Trauma to the area: No  Recent illness:  No  Previous similar problem: No  Previous evaluation:  No  Precipitating or alleviating factors:  Aggravating factors include: standing, walking, and crossing legs  Therapies tried and outcome: ice and Ibuprofen - both helpful    History of knee feeling \"weird\" starting in 01/?/24  -\"feels like something goes out of alignment, then there's a pop with sharp pain, and then it goes back into alignment\"  -Currently feels like there is a \"ball\" in the knee.    She denies any specific injury or trauma that may have caused this.  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  She is concerned because symptoms acutely became worse over the past few days without a clear cause.  Symptoms are feeling better today, but they are not resolved.    She has a known history of chronic left-sided low back pain and left-sided hip pain symptoms and has been doing some physical therapy for these.  She is wondering if this may be related to the knee pain problem.            Review of Systems  Constitutional, HEENT, cardiovascular, pulmonary, GI, , musculoskeletal, neuro, skin, endocrine and psych systems are negative, except as otherwise noted.      Objective    /63   Pulse 70   Temp 98.6  F (37  C) (Temporal)   Resp 15   Ht 1.654 m (5' 5.1\")   Wt 70.4 kg (155 lb 3.2 oz)   LMP 03/20/2024 " (Exact Date)   SpO2 100%   BMI 25.75 kg/m    Body mass index is 25.75 kg/m .  Physical Exam   GENERAL: alert and no distress  NECK: no adenopathy, no asymmetry, masses, or scars  MS: no gross musculoskeletal defects noted, no edema.trace effusion in the left knee.  Knee flexion and extension reveals some clicking on the left.  Michelle's, varus, valgus, Lachman's and drawer testing negative bilaterally.  Internal and external hip rotation normal bilaterally.  SKIN: no suspicious lesions or rashes  NEURO: Normal strength and tone, mentation intact and speech normal  PSYCH: mentation appears normal, affect normal/bright            Signed Electronically by: Colton Dobson,

## 2024-06-11 PROBLEM — M25.562 ACUTE PAIN OF LEFT KNEE: Status: ACTIVE | Noted: 2024-06-11

## 2024-06-12 ENCOUNTER — THERAPY VISIT (OUTPATIENT)
Dept: PHYSICAL THERAPY | Facility: CLINIC | Age: 34
End: 2024-06-12
Attending: FAMILY MEDICINE
Payer: COMMERCIAL

## 2024-06-12 DIAGNOSIS — M22.8X2 MALTRACKING OF LEFT PATELLA: Primary | ICD-10-CM

## 2024-06-12 DIAGNOSIS — M25.562 ACUTE PAIN OF LEFT KNEE: ICD-10-CM

## 2024-06-12 PROCEDURE — 97530 THERAPEUTIC ACTIVITIES: CPT | Mod: GP | Performed by: PHYSICAL THERAPIST

## 2024-06-12 PROCEDURE — 97161 PT EVAL LOW COMPLEX 20 MIN: CPT | Mod: GP | Performed by: PHYSICAL THERAPIST

## 2024-06-12 PROCEDURE — 97110 THERAPEUTIC EXERCISES: CPT | Mod: GP | Performed by: PHYSICAL THERAPIST

## 2024-06-12 ASSESSMENT — ACTIVITIES OF DAILY LIVING (ADL)
SQUAT: ACTIVITY IS SOMEWHAT DIFFICULT
HOW_WOULD_YOU_RATE_THE_OVERALL_FUNCTION_OF_YOUR_KNEE_DURING_YOUR_USUAL_DAILY_ACTIVITIES?: ABNORMAL
RISE FROM A CHAIR: ACTIVITY IS MINIMALLY DIFFICULT
HOW_WOULD_YOU_RATE_THE_CURRENT_FUNCTION_OF_YOUR_KNEE_DURING_YOUR_USUAL_DAILY_ACTIVITIES_ON_A_SCALE_FROM_0_TO_100_WITH_100_BEING_YOUR_LEVEL_OF_KNEE_FUNCTION_PRIOR_TO_YOUR_INJURY_AND_0_BEING_THE_INABILITY_TO_PERFORM_ANY_OF_YOUR_USUAL_DAILY_ACTIVITIES?: 80
WEAKNESS: THE SYMPTOM AFFECTS MY ACTIVITY SLIGHTLY
LIMPING: I HAVE THE SYMPTOM BUT IT DOES NOT AFFECT MY ACTIVITY
AS_A_RESULT_OF_YOUR_KNEE_INJURY,_HOW_WOULD_YOU_RATE_YOUR_CURRENT_LEVEL_OF_DAILY_ACTIVITY?: ABNORMAL
HOW_WOULD_YOU_RATE_THE_CURRENT_FUNCTION_OF_YOUR_KNEE_DURING_YOUR_USUAL_DAILY_ACTIVITIES_ON_A_SCALE_FROM_0_TO_100_WITH_100_BEING_YOUR_LEVEL_OF_KNEE_FUNCTION_PRIOR_TO_YOUR_INJURY_AND_0_BEING_THE_INABILITY_TO_PERFORM_ANY_OF_YOUR_USUAL_DAILY_ACTIVITIES?: 80
GO DOWN STAIRS: ACTIVITY IS SOMEWHAT DIFFICULT
KNEEL ON THE FRONT OF YOUR KNEE: ACTIVITY IS MINIMALLY DIFFICULT
LIMPING: I HAVE THE SYMPTOM BUT IT DOES NOT AFFECT MY ACTIVITY
HOW_WOULD_YOU_RATE_THE_OVERALL_FUNCTION_OF_YOUR_KNEE_DURING_YOUR_USUAL_DAILY_ACTIVITIES?: ABNORMAL
KNEE_ACTIVITY_OF_DAILY_LIVING_SUM: 50
GO UP STAIRS: ACTIVITY IS MINIMALLY DIFFICULT
WALK: ACTIVITY IS MINIMALLY DIFFICULT
KNEEL ON THE FRONT OF YOUR KNEE: ACTIVITY IS MINIMALLY DIFFICULT
WALK: ACTIVITY IS MINIMALLY DIFFICULT
SIT WITH YOUR KNEE BENT: ACTIVITY IS MINIMALLY DIFFICULT
SQUAT: ACTIVITY IS SOMEWHAT DIFFICULT
GO DOWN STAIRS: ACTIVITY IS SOMEWHAT DIFFICULT
GIVING WAY, BUCKLING OR SHIFTING OF KNEE: THE SYMPTOM AFFECTS MY ACTIVITY SLIGHTLY
WEAKNESS: THE SYMPTOM AFFECTS MY ACTIVITY SLIGHTLY
RAW_SCORE: 50
SIT WITH YOUR KNEE BENT: ACTIVITY IS MINIMALLY DIFFICULT
STIFFNESS: THE SYMPTOM AFFECTS MY ACTIVITY SLIGHTLY
SWELLING: I HAVE THE SYMPTOM BUT IT DOES NOT AFFECT MY ACTIVITY
STIFFNESS: THE SYMPTOM AFFECTS MY ACTIVITY SLIGHTLY
PAIN: THE SYMPTOM AFFECTS MY ACTIVITY SLIGHTLY
RISE FROM A CHAIR: ACTIVITY IS MINIMALLY DIFFICULT
STAND: ACTIVITY IS MINIMALLY DIFFICULT
KNEE_ACTIVITY_OF_DAILY_LIVING_SCORE: 71.43
STAND: ACTIVITY IS MINIMALLY DIFFICULT
AS_A_RESULT_OF_YOUR_KNEE_INJURY,_HOW_WOULD_YOU_RATE_YOUR_CURRENT_LEVEL_OF_DAILY_ACTIVITY?: ABNORMAL
PLEASE_INDICATE_YOR_PRIMARY_REASON_FOR_REFERRAL_TO_THERAPY:: KNEE
PAIN: THE SYMPTOM AFFECTS MY ACTIVITY SLIGHTLY
GIVING WAY, BUCKLING OR SHIFTING OF KNEE: THE SYMPTOM AFFECTS MY ACTIVITY SLIGHTLY
GO UP STAIRS: ACTIVITY IS MINIMALLY DIFFICULT
SWELLING: I HAVE THE SYMPTOM BUT IT DOES NOT AFFECT MY ACTIVITY

## 2024-06-12 NOTE — PROGRESS NOTES
PHYSICAL THERAPY EVALUATION  Type of Visit: Evaluation    See electronic medical record for Abuse and Falls Screening details.    Patient presents with signs and symptoms consistent with L knee pain secondary to patellar maltracking or quad/patellar tendonitis. Patient demonstrates impairments including hypermobile patella with poor quad control and TTP at medial>lateral knee joint. Patient with functional limitations including bending far down into a deep squat, lifting and walking long distances. Patient would benefit from skilled PT to progress and improve impairments and concerns.      Subjective       Presenting condition or subjective complaint: left knee stiffness and pain  Date of onset: 06/10/24 (PT order)    Relevant medical history: Asthma; Depression   Dates & types of surgery:    Past Surgical History:   Procedure Laterality Date    DENTAL SURGERY  2010    wisdom teeth     Prior diagnostic imaging/testing results: X-ray     Prior therapy history for the same diagnosis, illness or injury: No      Living Environment  Social support: With a significant other or spouse   Type of home: House; Multi-level   Stairs to enter the home: Yes 4 Is there a railing: Yes   Ramp: No   Stairs inside the home: Yes 20 Is there a railing: Yes   Help at home: None  Equipment owned:       Employment: Yes IT  Hobbies/Interests: Writing    Patient goals for therapy: play with my kids    Pain assessment: Pain present  See objective evaluation for additional pain details     Objective     KNEE EVALUATION  PAIN: Pain Location: medial knee and along patellar groove, some swelling at lateral knee joint  Pain Quality: stiffness, achy and dull/constant pain, out of alignment it is sharp  Pain is Exacerbated By: crossing legs or doing things for extended periods of time the knee would become out of alignment - now she notices it if she bends the knee too far in, feels stiffness with walking around, hasn't been going down the stairs    Pain is Relieved By: heat, ice and rest  Pain Progression: been getting better     ROM:   (Degrees) Left AROM Left PROM  Right AROM Right PROM   Knee Flexion 142      Knee Extension 0 with slight hyperextension        Strength: WFL  Pain: - none + mild ++ moderate +++ severe  Strength Scale: 0-5/5 Left Right   Knee Flexion     Knee Extension     Quad Set P at end range      FLEXIBILITY: decreased of hamstrings, HF, gastrocs    FUNCTIONAL TESTS: Double Leg Squat: appropriate posture, P at patellar tendon midway thru  PALPATION: TTP medial>lateral knee joint  JOINT MOBILITY: hypermobility of patella with slight maltracking      Assessment & Plan   CLINICAL IMPRESSIONS  Medical Diagnosis: Acute L knee pain    Treatment Diagnosis: Patellar maltracking, poor quad strength   Impression/Assessment: Patient is a 34 year old female with L knee pain complaints.  The following significant findings have been identified: Pain, Decreased ROM/flexibility, Decreased joint mobility, Decreased strength, and Decreased activity tolerance. These impairments interfere with their ability to perform self care tasks, recreational activities, household chores, household mobility, and community mobility as compared to previous level of function.     Clinical Decision Making (Complexity):  Clinical Presentation: Stable/Uncomplicated  Clinical Presentation Rationale: based on medical and personal factors listed in PT evaluation  Clinical Decision Making (Complexity): Low complexity    PLAN OF CARE  Treatment Interventions:  Modalities: Cryotherapy, E-stim, Ultrasound  Interventions: Gait Training, Manual Therapy, Neuromuscular Re-education, Therapeutic Activity, Therapeutic Exercise, Self-Care/Home Management    Long Term Goals     PT Goal 1  Goal Identifier: walking  Goal Description: Patient will be able to walk at least 3 miles per day without stiffness or unsteadiness to improve her exercise and community mobility without  difficulty.  Target Date: 07/27/24  PT Goal 2  Goal Identifier: Picking up kids  Goal Description: Patient will be able to bend down to  her kids without knee pain or difficulty in order to perform household chores.  Target Date: 08/11/24      Frequency of Treatment: 1x/week  Duration of Treatment: 10 weeks    Education Assessment:        Risks and benefits of evaluation/treatment have been explained.   Patient/Family/caregiver agrees with Plan of Care.     Evaluation Time:     PT Eval, Low Complexity Minutes (90178): 15       Signing Clinician: MODESTA OLIVA PT

## 2024-06-18 ENCOUNTER — THERAPY VISIT (OUTPATIENT)
Dept: PHYSICAL THERAPY | Facility: CLINIC | Age: 34
End: 2024-06-18
Attending: FAMILY MEDICINE
Payer: COMMERCIAL

## 2024-06-18 DIAGNOSIS — M25.562 ACUTE PAIN OF LEFT KNEE: Primary | ICD-10-CM

## 2024-06-18 DIAGNOSIS — M22.8X2 MALTRACKING OF LEFT PATELLA: ICD-10-CM

## 2024-06-18 PROCEDURE — 97110 THERAPEUTIC EXERCISES: CPT | Mod: GP | Performed by: PHYSICAL THERAPIST

## 2024-06-24 ENCOUNTER — THERAPY VISIT (OUTPATIENT)
Dept: PHYSICAL THERAPY | Facility: CLINIC | Age: 34
End: 2024-06-24
Attending: FAMILY MEDICINE
Payer: COMMERCIAL

## 2024-06-24 DIAGNOSIS — M22.8X2 MALTRACKING OF LEFT PATELLA: ICD-10-CM

## 2024-06-24 DIAGNOSIS — M25.562 ACUTE PAIN OF LEFT KNEE: Primary | ICD-10-CM

## 2024-06-24 PROCEDURE — 97140 MANUAL THERAPY 1/> REGIONS: CPT | Mod: GP | Performed by: PHYSICAL THERAPIST

## 2024-06-24 PROCEDURE — 97110 THERAPEUTIC EXERCISES: CPT | Mod: GP | Performed by: PHYSICAL THERAPIST

## 2024-06-24 PROCEDURE — 97112 NEUROMUSCULAR REEDUCATION: CPT | Mod: GP | Performed by: PHYSICAL THERAPIST

## 2024-07-10 ENCOUNTER — THERAPY VISIT (OUTPATIENT)
Dept: PHYSICAL THERAPY | Facility: CLINIC | Age: 34
End: 2024-07-10
Payer: COMMERCIAL

## 2024-07-10 DIAGNOSIS — M22.8X2 MALTRACKING OF LEFT PATELLA: ICD-10-CM

## 2024-07-10 DIAGNOSIS — M25.562 ACUTE PAIN OF LEFT KNEE: Primary | ICD-10-CM

## 2024-07-10 PROCEDURE — 97110 THERAPEUTIC EXERCISES: CPT | Mod: GP | Performed by: PHYSICAL THERAPIST

## 2024-07-10 ASSESSMENT — ACTIVITIES OF DAILY LIVING (ADL)
GO UP STAIRS: ACTIVITY IS NOT DIFFICULT
PAIN: I DO NOT HAVE THE SYMPTOM
AS_A_RESULT_OF_YOUR_KNEE_INJURY,_HOW_WOULD_YOU_RATE_YOUR_CURRENT_LEVEL_OF_DAILY_ACTIVITY?: NEARLY NORMAL
SIT WITH YOUR KNEE BENT: ACTIVITY IS NOT DIFFICULT
STIFFNESS: I DO NOT HAVE THE SYMPTOM
GO DOWN STAIRS: ACTIVITY IS NOT DIFFICULT
RAW_SCORE: 68
SQUAT: ACTIVITY IS NOT DIFFICULT
PAIN: I DO NOT HAVE THE SYMPTOM
HOW_WOULD_YOU_RATE_THE_OVERALL_FUNCTION_OF_YOUR_KNEE_DURING_YOUR_USUAL_DAILY_ACTIVITIES?: NORMAL
WALK: ACTIVITY IS NOT DIFFICULT
KNEE_ACTIVITY_OF_DAILY_LIVING_SUM: 68
RISE FROM A CHAIR: ACTIVITY IS NOT DIFFICULT
RISE FROM A CHAIR: ACTIVITY IS NOT DIFFICULT
STAND: ACTIVITY IS NOT DIFFICULT
WEAKNESS: I HAVE THE SYMPTOM BUT IT DOES NOT AFFECT MY ACTIVITY
HOW_WOULD_YOU_RATE_THE_OVERALL_FUNCTION_OF_YOUR_KNEE_DURING_YOUR_USUAL_DAILY_ACTIVITIES?: NORMAL
GO DOWN STAIRS: ACTIVITY IS NOT DIFFICULT
AS_A_RESULT_OF_YOUR_KNEE_INJURY,_HOW_WOULD_YOU_RATE_YOUR_CURRENT_LEVEL_OF_DAILY_ACTIVITY?: NEARLY NORMAL
STIFFNESS: I DO NOT HAVE THE SYMPTOM
GIVING WAY, BUCKLING OR SHIFTING OF KNEE: I HAVE THE SYMPTOM BUT IT DOES NOT AFFECT MY ACTIVITY
KNEEL ON THE FRONT OF YOUR KNEE: ACTIVITY IS NOT DIFFICULT
SIT WITH YOUR KNEE BENT: ACTIVITY IS NOT DIFFICULT
GO UP STAIRS: ACTIVITY IS NOT DIFFICULT
KNEEL ON THE FRONT OF YOUR KNEE: ACTIVITY IS NOT DIFFICULT
HOW_WOULD_YOU_RATE_THE_CURRENT_FUNCTION_OF_YOUR_KNEE_DURING_YOUR_USUAL_DAILY_ACTIVITIES_ON_A_SCALE_FROM_0_TO_100_WITH_100_BEING_YOUR_LEVEL_OF_KNEE_FUNCTION_PRIOR_TO_YOUR_INJURY_AND_0_BEING_THE_INABILITY_TO_PERFORM_ANY_OF_YOUR_USUAL_DAILY_ACTIVITIES?: 100
SQUAT: ACTIVITY IS NOT DIFFICULT
HOW_WOULD_YOU_RATE_THE_CURRENT_FUNCTION_OF_YOUR_KNEE_DURING_YOUR_USUAL_DAILY_ACTIVITIES_ON_A_SCALE_FROM_0_TO_100_WITH_100_BEING_YOUR_LEVEL_OF_KNEE_FUNCTION_PRIOR_TO_YOUR_INJURY_AND_0_BEING_THE_INABILITY_TO_PERFORM_ANY_OF_YOUR_USUAL_DAILY_ACTIVITIES?: 100
STAND: ACTIVITY IS NOT DIFFICULT
SWELLING: I DO NOT HAVE THE SYMPTOM
WEAKNESS: I HAVE THE SYMPTOM BUT IT DOES NOT AFFECT MY ACTIVITY
KNEE_ACTIVITY_OF_DAILY_LIVING_SCORE: 97.14
SWELLING: I DO NOT HAVE THE SYMPTOM
WALK: ACTIVITY IS NOT DIFFICULT
LIMPING: I DO NOT HAVE THE SYMPTOM
LIMPING: I DO NOT HAVE THE SYMPTOM
GIVING WAY, BUCKLING OR SHIFTING OF KNEE: I HAVE THE SYMPTOM BUT IT DOES NOT AFFECT MY ACTIVITY

## 2024-07-29 ENCOUNTER — TRANSFERRED RECORDS (OUTPATIENT)
Dept: HEALTH INFORMATION MANAGEMENT | Facility: CLINIC | Age: 34
End: 2024-07-29
Payer: COMMERCIAL

## 2024-07-29 ENCOUNTER — TELEPHONE (OUTPATIENT)
Dept: FAMILY MEDICINE | Facility: CLINIC | Age: 34
End: 2024-07-29
Payer: COMMERCIAL

## 2024-07-29 DIAGNOSIS — M25.562 ACUTE PAIN OF LEFT KNEE: Primary | ICD-10-CM

## 2024-07-29 NOTE — TELEPHONE ENCOUNTER
So patient has this scheduled at [facility] already?      I would like this to be done at [facility] with the order the orthopedist placed so that the results go to the orthopedist AND the correct MRI is done (intrarticular contrast?  Intravenous?  I don't know as I haven't done the most recent evaluation and I'm not a knee specialist).    Why don't I put in a referral to [facility] and include in the notes that this includes any imaging the [facility] doc recommends?    I'll go ahead and do that now.    Let me know if there are further referrals I need to place.

## 2024-07-29 NOTE — TELEPHONE ENCOUNTER
"  Order/Referral Request    Who is requesting: Patient    Orders being requested: MRI LT knee    Reason service is needed/diagnosis:     When are orders needed by: Asap    Has this been discussed with Provider: Patient states she is sure it has, but she did see another physician under the umbrella and that is where all of this came from. Was advised by insurance to have PCP complete    Does patient have a preference on a Group/Provider/Facility? [facility] to complete MRI     Does patient have an appointment scheduled?: Yes: Scheduled evening 7/29. Writer advised patient to reschedule that as PCP is not on site and will not be until 81/24. If agreeable to referral for MRI please route to \"[facility]\" for referral team to coordinate with patient insurance-thanks!    Where to send orders: Place orders within Epic    Could we [facility] you [facility] or would you prefer to receive a phone call?:   Patient would prefer a phone call   Okay to leave a detailed message?: Yes at Home number on file [phone] (home)  "

## 2024-07-29 NOTE — TELEPHONE ENCOUNTER
Connected with patient and relayed message   Advised referral had been faxed to [facility] w/ face sheet [phone]  She [facility] as well-

## 2024-08-28 ENCOUNTER — OFFICE VISIT (OUTPATIENT)
Dept: FAMILY MEDICINE | Facility: CLINIC | Age: 34
End: 2024-08-28
Payer: COMMERCIAL

## 2024-08-28 VITALS
OXYGEN SATURATION: 100 % | HEIGHT: 65 IN | TEMPERATURE: 97.3 F | RESPIRATION RATE: 16 BRPM | DIASTOLIC BLOOD PRESSURE: 68 MMHG | HEART RATE: 83 BPM | WEIGHT: 159.7 LBS | SYSTOLIC BLOOD PRESSURE: 110 MMHG | BODY MASS INDEX: 26.61 KG/M2

## 2024-08-28 DIAGNOSIS — Z01.818 PREOP GENERAL PHYSICAL EXAM: Primary | ICD-10-CM

## 2024-08-28 DIAGNOSIS — M25.562 CHRONIC PAIN OF LEFT KNEE: ICD-10-CM

## 2024-08-28 DIAGNOSIS — G89.29 CHRONIC PAIN OF LEFT KNEE: ICD-10-CM

## 2024-08-28 LAB — HCG UR QL: NEGATIVE

## 2024-08-28 PROCEDURE — 99213 OFFICE O/P EST LOW 20 MIN: CPT | Performed by: FAMILY MEDICINE

## 2024-08-28 PROCEDURE — 81025 URINE PREGNANCY TEST: CPT | Performed by: FAMILY MEDICINE

## 2024-08-28 ASSESSMENT — PAIN SCALES - GENERAL: PAINLEVEL: NO PAIN (0)

## 2024-08-28 NOTE — PATIENT INSTRUCTIONS

## 2024-08-28 NOTE — PROGRESS NOTES
Preoperative Evaluation  [address]  [address]  [place] [address]  Phone: [phone]  Fax: [phone]  Primary Provider: Ann Mittal MD  Pre-op Performing Provider: Ann Mittal MD  Aug 28, 2024             8/28/2024   Surgical Information   What procedure is being done? left knee surgery   [facility]   Who is doing the procedure / surgery? michelle echeverria   Date of surgery / procedure: september 3   Time of surgery / procedure: 1000   Where do you plan to recover after surgery? at home with family        Fax number for surgical facility: [phone]    Assessment & Plan     The proposed surgical procedure is considered INTERMEDIATE risk.    Preop general physical exam  For  Chronic pain of left knee  - HCG qualitative urine; Future                - No identified additional risk factors other than previously addressed    Preoperative Medication Instructions  Antiplatelet or Anticoagulation Medication Instructions   - Patient is on no antiplatelet or anticoagulation medications.    Additional Medication Instructions  Patient is on no additional chronic medications    Recommendation  Approval given to proceed with proposed procedure, without further diagnostic evaluation.    Shan Bai is a 34 year old, presenting for the following:  Pre-Op Exam          8/28/2024     3:43 PM   Additional Questions   Roomed by Nagi Doherty   Accompanied by Self     HPI related to upcoming procedure:   Left knee pain - having arthroscopy.        8/28/2024   Pre-Op Questionnaire   Have you ever had a heart attack or stroke? No   Have you ever had surgery on your heart or blood vessels, such as a stent placement, a coronary artery bypass, or surgery on an artery in your head, neck, heart, or legs? No   Do you have chest pain with activity? No   Do you have a history of heart  failure? No   Do you currently have a cold, bronchitis or symptoms of other infection? No   Do you have a cough, shortness of breath, or wheezing? No   Do you or anyone in your family have previous history of blood clots? No   Do you or does anyone in your family have a serious bleeding problem such as prolonged bleeding following surgeries or cuts? No   Have you ever had problems with anemia or been told to take iron pills? No   Have you had any abnormal blood loss such as black, tarry or bloody stools, or abnormal vaginal bleeding? No   Have you ever had a blood transfusion? No   Are you willing to have a blood transfusion if it is medically needed before, during, or after your surgery? Yes   Have you or any of your relatives ever had problems with anesthesia? No   Do you have sleep apnea, excessive snoring or daytime drowsiness? No   Do you have any artifical heart valves or other implanted medical devices like a pacemaker, defibrillator, or continuous glucose monitor? No   Do you have artificial joints? No   Are you allergic to latex? No        Health Care Directive  Patient has a Health Care Directive on file      Preoperative Review of    reviewed - no record of controlled substances prescribed.          Patient Active Problem List    Diagnosis Date Noted    Chronic pain of left knee 06/11/2024     Priority: Medium    TMJ (temporomandibular joint syndrome) 06/06/2024     Priority: Medium     Per patient report started in 2016.      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 Comes and goes.  In physical therapy currently.      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  Overuse initially? Persists on and off, currently in physical therapy again.      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 09/18/2018     Priority: Medium      Past Medical History:   Diagnosis Date    Alopecia universalis     Asthma     cats and humidity     Astigmatism     Myopia     Pain of left sacroiliac joint     Secondary amenorrhea      Past Surgical History:   Procedure Laterality Date    DENTAL SURGERY  2010    wisdom teeth     Current Outpatient Medications   Medication Sig Dispense Refill    albuterol (PROAIR HFA/PROVENTIL HFA/VENTOLIN HFA) 108 (90 Base) MCG/ACT inhaler Inhale 1-2 puffs into the lungs every 6 hours as needed for shortness of breath or wheezing 8.5 g 1       Allergies   Allergen Reactions    Cats         Social History     Tobacco Use    Smoking status: Never     Passive exposure: Never    Smokeless tobacco: Never   Substance Use Topics    Alcohol use: Not Currently       History   Drug Use No             Review of Systems  Constitutional, HEENT, cardiovascular, pulmonary, gi and gu systems are negative, except as otherwise noted.    Objective    /68 (BP Location: Right arm, Patient Position: Sitting, Cuff Size: Adult Regular)   Pulse 83   Temp 97.3  F (36.3  C) (Temporal)   Resp 16   Ht 1.651 m (5' 5\")   Wt 72.4 kg (159 lb 11.2 oz)   LMP  (LMP Unknown)   SpO2 100%   Breastfeeding No   BMI 26.58 kg/m     Estimated body mass index is 26.58 kg/m  as calculated from the following:    Height as of this encounter: 1.651 m (5' 5\").    Weight as of this encounter: 72.4 kg (159 lb 11.2 oz).  Physical Exam  GENERAL: alert and no distress  NECK: no adenopathy, no asymmetry, masses, or scars  RESP: lungs clear to auscultation - no rales, " "rhonchi or wheezes  CV: regular rate and rhythm, normal S1 S2, no S3 or S4, no murmur, click or rub, no peripheral edema  ABDOMEN: soft, nontender, no hepatosplenomegaly, no masses and bowel sounds normal  MS: no gross musculoskeletal defects noted, no edema    No results for input(s): \"HGB\", \"PLT\", \"INR\", \"NA\", \"POTASSIUM\", \"CR\", \"A1C\" in the last 8760 hours.     Diagnostics  Labs pending at this time.  Results will be reviewed when available.   No EKG required, no history of coronary heart disease, significant arrhythmia, peripheral arterial disease or other structural heart disease.    Revised Cardiac Risk Index (RCRI)  The patient has the following serious cardiovascular risks for perioperative complications:   - No serious cardiac risks = 0 points     RCRI Interpretation: 0 points: Class I (very low risk - 0.4% complication rate)         Signed Electronically by: Ann Mittal MD  A copy of this evaluation report is provided to the requesting physician.         "

## 2024-09-03 ENCOUNTER — LAB REQUISITION (OUTPATIENT)
Dept: LAB | Facility: CLINIC | Age: 34
End: 2024-09-03
Payer: COMMERCIAL

## 2024-09-03 PROCEDURE — 88305 TISSUE EXAM BY PATHOLOGIST: CPT | Mod: TC,ORL | Performed by: ORTHOPAEDIC SURGERY

## 2024-09-04 LAB
PATH REPORT.COMMENTS IMP SPEC: NORMAL
PATH REPORT.COMMENTS IMP SPEC: NORMAL
PATH REPORT.FINAL DX SPEC: NORMAL
PATH REPORT.GROSS SPEC: NORMAL
PATH REPORT.MICROSCOPIC SPEC OTHER STN: NORMAL
PATH REPORT.RELEVANT HX SPEC: NORMAL
PHOTO IMAGE: NORMAL

## 2024-09-04 PROCEDURE — 88305 TISSUE EXAM BY PATHOLOGIST: CPT | Mod: 26 | Performed by: PATHOLOGY

## 2024-09-09 NOTE — PROGRESS NOTES
[facility]    Outpatient Physical Therapy Discharge Note  Patient: Bhumika Gonsalez  : 1990    Patient was seen for 4 visits for knee pain from 24 to 7'10/24 with no follow up appointments.      Plan:  Discharge from therapy.     Reason for Discharge: Patient has met all goals.  No further expectation of progress.    Discharge Plan: Patient to continue home program.     If patient would like to return to therapy, they will need a new PT order from referring provider.    Feli Hsieh, PT   2024

## 2024-09-16 ENCOUNTER — TRANSFERRED RECORDS (OUTPATIENT)
Dept: HEALTH INFORMATION MANAGEMENT | Facility: CLINIC | Age: 34
End: 2024-09-16
Payer: COMMERCIAL

## 2025-02-10 ENCOUNTER — OFFICE VISIT (OUTPATIENT)
Dept: FAMILY MEDICINE | Facility: CLINIC | Age: 35
End: 2025-02-10
Attending: FAMILY MEDICINE
Payer: COMMERCIAL

## 2025-02-10 VITALS
RESPIRATION RATE: 16 BRPM | HEART RATE: 70 BPM | TEMPERATURE: 97.1 F | HEIGHT: 65 IN | OXYGEN SATURATION: 99 % | DIASTOLIC BLOOD PRESSURE: 82 MMHG | WEIGHT: 168 LBS | BODY MASS INDEX: 27.99 KG/M2 | SYSTOLIC BLOOD PRESSURE: 118 MMHG

## 2025-02-10 DIAGNOSIS — Z13.1 SCREENING FOR DIABETES MELLITUS: ICD-10-CM

## 2025-02-10 DIAGNOSIS — R73.03 PREDIABETES: Primary | ICD-10-CM

## 2025-02-10 DIAGNOSIS — Z12.4 CERVICAL CANCER SCREENING: ICD-10-CM

## 2025-02-10 DIAGNOSIS — N92.6 IRREGULAR PERIODS: ICD-10-CM

## 2025-02-10 DIAGNOSIS — Z00.00 ROUTINE GENERAL MEDICAL EXAMINATION AT A HEALTH CARE FACILITY: Primary | ICD-10-CM

## 2025-02-10 LAB
ALBUMIN SERPL BCG-MCNC: 4.4 G/DL (ref 3.5–5.2)
ALP SERPL-CCNC: 58 U/L (ref 40–150)
ALT SERPL W P-5'-P-CCNC: 7 U/L (ref 0–50)
ANION GAP SERPL CALCULATED.3IONS-SCNC: 11 MMOL/L (ref 7–15)
AST SERPL W P-5'-P-CCNC: 20 U/L (ref 0–45)
BILIRUB SERPL-MCNC: 0.7 MG/DL
BUN SERPL-MCNC: 10.1 MG/DL (ref 6–20)
CALCIUM SERPL-MCNC: 9.4 MG/DL (ref 8.8–10.4)
CHLORIDE SERPL-SCNC: 104 MMOL/L (ref 98–107)
CREAT SERPL-MCNC: 0.77 MG/DL (ref 0.51–0.95)
EGFRCR SERPLBLD CKD-EPI 2021: >90 ML/MIN/1.73M2
ERYTHROCYTE [DISTWIDTH] IN BLOOD BY AUTOMATED COUNT: 13.8 % (ref 10–15)
EST. AVERAGE GLUCOSE BLD GHB EST-MCNC: 134 MG/DL
GLUCOSE SERPL-MCNC: 91 MG/DL (ref 70–99)
HBA1C MFR BLD: 6.3 % (ref 0–5.6)
HCO3 SERPL-SCNC: 24 MMOL/L (ref 22–29)
HCT VFR BLD AUTO: 43.1 % (ref 35–47)
HGB BLD-MCNC: 13.6 G/DL (ref 11.7–15.7)
MCH RBC QN AUTO: 27.7 PG (ref 26.5–33)
MCHC RBC AUTO-ENTMCNC: 31.6 G/DL (ref 31.5–36.5)
MCV RBC AUTO: 88 FL (ref 78–100)
PLATELET # BLD AUTO: 339 10E3/UL (ref 150–450)
POTASSIUM SERPL-SCNC: 3.8 MMOL/L (ref 3.4–5.3)
PROT SERPL-MCNC: 8 G/DL (ref 6.4–8.3)
RBC # BLD AUTO: 4.91 10E6/UL (ref 3.8–5.2)
SODIUM SERPL-SCNC: 139 MMOL/L (ref 135–145)
TSH SERPL DL<=0.005 MIU/L-ACNC: 1.9 UIU/ML (ref 0.3–4.2)
WBC # BLD AUTO: 6.4 10E3/UL (ref 4–11)

## 2025-02-10 PROCEDURE — 99213 OFFICE O/P EST LOW 20 MIN: CPT | Mod: 25 | Performed by: STUDENT IN AN ORGANIZED HEALTH CARE EDUCATION/TRAINING PROGRAM

## 2025-02-10 PROCEDURE — 80053 COMPREHEN METABOLIC PANEL: CPT | Performed by: STUDENT IN AN ORGANIZED HEALTH CARE EDUCATION/TRAINING PROGRAM

## 2025-02-10 PROCEDURE — 91320 SARSCV2 VAC 30MCG TRS-SUC IM: CPT | Performed by: STUDENT IN AN ORGANIZED HEALTH CARE EDUCATION/TRAINING PROGRAM

## 2025-02-10 PROCEDURE — 85027 COMPLETE CBC AUTOMATED: CPT | Performed by: STUDENT IN AN ORGANIZED HEALTH CARE EDUCATION/TRAINING PROGRAM

## 2025-02-10 PROCEDURE — 87624 HPV HI-RISK TYP POOLED RSLT: CPT | Performed by: STUDENT IN AN ORGANIZED HEALTH CARE EDUCATION/TRAINING PROGRAM

## 2025-02-10 PROCEDURE — G2211 COMPLEX E/M VISIT ADD ON: HCPCS | Performed by: STUDENT IN AN ORGANIZED HEALTH CARE EDUCATION/TRAINING PROGRAM

## 2025-02-10 PROCEDURE — 90471 IMMUNIZATION ADMIN: CPT | Performed by: STUDENT IN AN ORGANIZED HEALTH CARE EDUCATION/TRAINING PROGRAM

## 2025-02-10 PROCEDURE — 36415 COLL VENOUS BLD VENIPUNCTURE: CPT | Performed by: STUDENT IN AN ORGANIZED HEALTH CARE EDUCATION/TRAINING PROGRAM

## 2025-02-10 PROCEDURE — 84443 ASSAY THYROID STIM HORMONE: CPT | Performed by: STUDENT IN AN ORGANIZED HEALTH CARE EDUCATION/TRAINING PROGRAM

## 2025-02-10 PROCEDURE — G0145 SCR C/V CYTO,THINLAYER,RESCR: HCPCS | Performed by: STUDENT IN AN ORGANIZED HEALTH CARE EDUCATION/TRAINING PROGRAM

## 2025-02-10 PROCEDURE — 83036 HEMOGLOBIN GLYCOSYLATED A1C: CPT | Performed by: STUDENT IN AN ORGANIZED HEALTH CARE EDUCATION/TRAINING PROGRAM

## 2025-02-10 PROCEDURE — 99395 PREV VISIT EST AGE 18-39: CPT | Mod: 25 | Performed by: STUDENT IN AN ORGANIZED HEALTH CARE EDUCATION/TRAINING PROGRAM

## 2025-02-10 PROCEDURE — 90656 IIV3 VACC NO PRSV 0.5 ML IM: CPT | Performed by: STUDENT IN AN ORGANIZED HEALTH CARE EDUCATION/TRAINING PROGRAM

## 2025-02-10 PROCEDURE — 90480 ADMN SARSCOV2 VAC 1/ONLY CMP: CPT | Performed by: STUDENT IN AN ORGANIZED HEALTH CARE EDUCATION/TRAINING PROGRAM

## 2025-02-10 SDOH — HEALTH STABILITY: PHYSICAL HEALTH: ON AVERAGE, HOW MANY DAYS PER WEEK DO YOU ENGAGE IN MODERATE TO STRENUOUS EXERCISE (LIKE A BRISK WALK)?: 2 DAYS

## 2025-02-10 SDOH — HEALTH STABILITY: PHYSICAL HEALTH: ON AVERAGE, HOW MANY MINUTES DO YOU ENGAGE IN EXERCISE AT THIS LEVEL?: 30 MIN

## 2025-02-10 ASSESSMENT — PAIN SCALES - GENERAL: PAINLEVEL_OUTOF10: NO PAIN (0)

## 2025-02-10 ASSESSMENT — SOCIAL DETERMINANTS OF HEALTH (SDOH): HOW OFTEN DO YOU GET TOGETHER WITH FRIENDS OR RELATIVES?: MORE THAN THREE TIMES A WEEK

## 2025-02-10 NOTE — PROGRESS NOTES
"Preventive Care Visit  [facility]  Feb 10, 2025      Assessment & Plan     Routine general medical examination at a health care facility  Vitals: WNL  Labs: cmp, tsh, cbc, a1c  LMP:   December, irregular (see below)  Immunizations: flu, covid      Pap: DUE, done today  Mammogram: Will begin breast cancer screening at age 40.  No increased risk factors.          Colon cancer screen:  Will begin colon cancer screening at age 45.  No increased risk factors.    Cervical cancer screening  - HPV and Gynecologic Cytology Panel - Recommended Age 30 - 65 Years    Irregular periods  Irregular periods. Stopped breastfeeding 1 year ago. Has had this before. Will start with labs and pelvic US. Will likely refer back to ob/gyn. Prefers to avoid birth control at this time.  - TSH with free T4 reflex  - Comprehensive metabolic panel  - CBC  - A1C  - US Pelvic Complete with Transvaginal      BMI  Estimated body mass index is 27.7 kg/m  as calculated from the following:    Height as of this encounter: 1.659 m (5' 5.3\").    Weight as of this encounter: 76.2 kg (168 lb).   Weight management plan: Discussed healthy diet and exercise guidelines    Counseling  Appropriate preventive services were addressed with this patient via screening, questionnaire, or discussion as appropriate for fall prevention, nutrition, physical activity, Tobacco-use cessation, social engagement, weight loss and cognition.  Checklist reviewing preventive services available has been given to the patient.  Reviewed patient's diet, addressing concerns and/or questions.   She is at risk for lack of exercise and has been provided with information to increase physical activity for the benefit of her well-being.     The longitudinal plan of care for the diagnosis(es)/condition(s) as documented were addressed during this visit. Due to the added complexity in care, I will continue to support Bhumika in " the subsequent management and with ongoing continuity of care.    Shan Bai is a 34 year old, presenting for the following:  Physical (And pap)        8/28/2024     3:43 PM   Additional Questions   Roomed by Nagi Doherty   Accompanied by Self          HPI    Work-IT  Pap/hpv-DUE    Health Care Directive  Patient has a Health Care Directive on file  Discussed advance care planning with patient.        2/10/2025   General Health   How would you rate your overall physical health? Good   Feel stress (tense, anxious, or unable to sleep) Only a little   (!) STRESS CONCERN      2/10/2025   Nutrition   Three or more servings of calcium each day? Yes   Diet: Regular (no restrictions)   How many servings of fruit and vegetables per day? 4 or more   How many sweetened beverages each day? 0-1         2/10/2025   Exercise   Days per week of moderate/strenous exercise 2 days   Average minutes spent exercising at this level 30 min   (!) EXERCISE CONCERN      2/10/2025   Social Factors   Frequency of gathering with friends or relatives More than three times a week   Worry food won't last until get money to buy more No   Food not last or not have enough money for food? No   Do you have housing? (Housing is defined as stable permanent housing and does not include staying ouside in a car, in a tent, in an abandoned building, in an overnight shelter, or couch-surfing.) Yes   Are you worried about losing your housing? No   Lack of transportation? No   Unable to get utilities (heat,electricity)? No         2/10/2025   Dental   Dentist two times every year? Yes         2/8/2024   TB Screening   Were you born outside of the [place]? No       Today's PHQ-2 Score:       2/10/2025    11:12 AM   PHQ-2 ( 1999 [employer])   Q1: Little interest or pleasure in doing things 0   Q2: Feeling down, depressed or hopeless 0   PHQ-2 Score 0           2/10/2025   Substance Use   Alcohol more than 3/day or more than 7/wk No   Do you use any other substances  "recreationally? (!) CANNABIS PRODUCTS     Social History     Tobacco Use    Smoking status: Never     Passive exposure: Never    Smokeless tobacco: Never   Vaping Use    Vaping status: Never Used   Substance Use Topics    Alcohol use: Not Currently    Drug use: No           2/10/2025   STI Screening   New sexual partner(s) since last STI/HIV test? No     History of abnormal Pap smear: No - age 30-64 HPV with reflex Pap every 5 years recommended        10/29/2021     4:04 PM 9/18/2018    10:10 AM   PAP / HPV   PAP Negative for Intraepithelial Lesion or Malignancy (NILM)     PAP (Historical)  NIL            2/10/2025   Contraception/Family Planning   Questions about contraception or family planning No       Reviewed and updated as needed this visit by Provider   Tobacco   Meds   Med Hx  Surg Hx  Fam Hx  Soc Hx Sexual Activity             Objective    Exam  /82 (BP Location: Right arm, Patient Position: Sitting, Cuff Size: Adult Regular)   Pulse 70   Temp 97.1  F (36.2  C) (Temporal)   Resp 16   Ht 1.659 m (5' 5.3\")   Wt 76.2 kg (168 lb)   LMP 12/24/2024 (Approximate)   SpO2 99%   BMI 27.70 kg/m     Estimated body mass index is 27.7 kg/m  as calculated from the following:    Height as of this encounter: 1.659 m (5' 5.3\").    Weight as of this encounter: 76.2 kg (168 lb).    Physical Exam  Constitutional:       General: She is not in acute distress.     Appearance: She is well-developed.   HENT:      Head: Normocephalic and atraumatic.      Right Ear: External ear normal.      Left Ear: External ear normal.      Nose: Nose normal.      Mouth/Throat:      Mouth: Mucous membranes are moist.      Pharynx: Oropharynx is clear. No oropharyngeal exudate.   Eyes:      Conjunctiva/sclera: Conjunctivae normal.      Pupils: Pupils are equal, round, and reactive to light.   Neck:      Thyroid: No thyroid mass, thyromegaly or thyroid tenderness.   Cardiovascular:      Rate and Rhythm: Normal rate and regular " rhythm.      Heart sounds: Normal heart sounds. No murmur heard.  Pulmonary:      Effort: Pulmonary effort is normal.      Breath sounds: No wheezing or rales.   Chest:   Breasts:     Right: Normal.      Left: Normal.   Abdominal:      Tenderness: There is no abdominal tenderness.   Genitourinary:     General: Normal vulva.      Vagina: Normal.      Cervix: Normal.   Musculoskeletal:      Cervical back: Normal range of motion and neck supple. No rigidity or tenderness.   Lymphadenopathy:      Cervical: No cervical adenopathy.      Upper Body:      Right upper body: No supraclavicular, axillary or pectoral adenopathy.      Left upper body: No supraclavicular, axillary or pectoral adenopathy.   Skin:     General: Skin is warm and dry.      Findings: No rash.   Neurological:      General: No focal deficit present.      Mental Status: She is alert and oriented to person, place, and time. Mental status is at baseline.   Psychiatric:         Mood and Affect: Mood normal.         Behavior: Behavior normal.         Thought Content: Thought content normal.         Signed Electronically by: Eder Byrd DO

## 2025-02-10 NOTE — PATIENT INSTRUCTIONS
Patient Education       Thank you for coming to see me today! Here are a couple of pieces of information about my schedule and communication practices.     I am not in the clinic on Tuesdays. Non-urgent calls and messages received on Tuesdays will be addressed as soon as I am able when I am back in the office on the next business day. Urgent calls will be addressed by a covering clinician.       If lab work was done today as part of your evaluation you will generally be contacted via [facility], mail, or phone with the results within 7-10 days.  If there is an alarming/concerning result we will contact you by phone. Lab results come back at varying times, I generally wait until all labs are resulted before making comments on results. Please note, labs are automatically released to [facility] once available, but it may take a couple of days for my interpretation note to appear.      I try very hard to respond to medical messages with 2 business days of receiving them. Occasionally it takes me longer if I am trying to figure out the best way to respond and need to seek guidance, do some research or dig deeper into your medical history to come up with a helpful response.      If you need refills please contact your pharmacist. They will send a refill request to me to review. Please allow 3-5 business days for us to respond to all refill requests.      Please call or send a medical message with any questions or concerns. Thank you for trusting me to be part of your healthcare team!        Dr. Eder Byrd    Preventive Care Advice   This is general advice given by our system to help you stay healthy. However, your care team may have specific advice just for you. Please talk to your care team about your preventive care needs.  Nutrition  Eat 5 or more servings of fruits and vegetables each day.  Try wheat bread, brown rice and whole grain pasta (instead of white bread, rice, and pasta).  Get enough calcium and vitamin D. Check  the label on foods and aim for 100% of the RDA (recommended daily allowance).  Lifestyle  Exercise at least 150 minutes each week  (30 minutes a day, 5 days a week).  Do muscle strengthening activities 2 days a week. These help control your weight and prevent disease.  No smoking.  Wear sunscreen to prevent skin cancer.  Have a dental exam and cleaning every 6 months.  Yearly exams  See your health care team every year to talk about:  Any changes in your health.  Any medicines your care team has prescribed.  Preventive care, family planning, and ways to prevent chronic diseases.  Shots (vaccines)   HPV shots (up to age 26), if you've never had them before.  Hepatitis B shots (up to age 59), if you've never had them before.  COVID-19 shot: Get this shot when it's due.  Flu shot: Get a flu shot every year.  Tetanus shot: Get a tetanus shot every 10 years.  Pneumococcal, hepatitis A, and RSV shots: Ask your care team if you need these based on your risk.  Shingles shot (for age 50 and up)  General health tests  Diabetes screening:  Starting at age 35, Get screened for diabetes at least every 3 years.  If you are younger than age 35, ask your care team if you should be screened for diabetes.  Cholesterol test: At age 39, start having a cholesterol test every 5 years, or more often if advised.  Bone density scan (DEXA): At age 50, ask your care team if you should have this scan for osteoporosis (brittle bones).  Hepatitis C: Get tested at least once in your life.  STIs (sexually transmitted infections)  Before age 24: Ask your care team if you should be screened for STIs.  After age 24: Get screened for STIs if you're at risk. You are at risk for STIs (including HIV) if:  You are sexually active with more than one person.  You don't use condoms every time.  You or a partner was diagnosed with a sexually transmitted infection.  If you are at risk for HIV, ask about PrEP medicine to prevent HIV.  Get tested for HIV at  least once in your life, whether you are at risk for HIV or not.  Cancer screening tests  Cervical cancer screening: If you have a cervix, begin getting regular cervical cancer screening tests starting at age 21.  Breast cancer scan (mammogram): If you've ever had breasts, begin having regular mammograms starting at age 40. This is a scan to check for breast cancer.  Colon cancer screening: It is important to start screening for colon cancer at age 45.  Have a colonoscopy test every 10 years (or more often if you're at risk) Or, ask your provider about stool tests like a FIT test every year or Cologuard test every 3 years.  To learn more about your testing options, visit:   .  For help making a decision, visit:   https://bit.ly/qo18640.  Prostate cancer screening test: If you have a prostate, ask your care team if a prostate cancer screening test (PSA) at age 55 is right for you.  Lung cancer screening: If you are a current or former smoker ages 50 to 80, ask your care team if ongoing lung cancer screenings are right for you.  For informational purposes only. Not to replace the advice of your health care provider. Copyright   2023 [employer]. All rights reserved. Clinically reviewed by the [facility] Transitions Program. [employer] 228141 - REV 01/24.

## 2025-02-11 LAB
HPV HR 12 DNA CVX QL NAA+PROBE: NEGATIVE
HPV16 DNA CVX QL NAA+PROBE: NEGATIVE
HPV18 DNA CVX QL NAA+PROBE: NEGATIVE
HUMAN PAPILLOMA VIRUS FINAL DIAGNOSIS: NORMAL

## 2025-02-13 LAB
BKR AP ASSOCIATED HPV REPORT: NORMAL
BKR LAB AP GYN ADEQUACY: NORMAL
BKR LAB AP GYN INTERPRETATION: NORMAL
BKR LAB AP LMP: NORMAL
BKR LAB AP PREVIOUS ABNORMAL: NORMAL
PATH REPORT.COMMENTS IMP SPEC: NORMAL
PATH REPORT.COMMENTS IMP SPEC: NORMAL
PATH REPORT.RELEVANT HX SPEC: NORMAL

## 2025-02-18 ENCOUNTER — ANCILLARY PROCEDURE (OUTPATIENT)
Dept: ULTRASOUND IMAGING | Facility: CLINIC | Age: 35
End: 2025-02-18
Attending: STUDENT IN AN ORGANIZED HEALTH CARE EDUCATION/TRAINING PROGRAM
Payer: COMMERCIAL

## 2025-02-18 DIAGNOSIS — N92.6 IRREGULAR PERIODS: ICD-10-CM

## 2025-02-18 PROCEDURE — 76856 US EXAM PELVIC COMPLETE: CPT

## 2025-02-19 DIAGNOSIS — R93.89 THICKENED ENDOMETRIUM: ICD-10-CM

## 2025-02-19 DIAGNOSIS — N92.6 IRREGULAR PERIODS: Primary | ICD-10-CM

## 2025-03-23 NOTE — TELEPHONE ENCOUNTER
Appointment done!  
Bhumika Gonsalez  is scheduled for a follicle check on 4/12/2022. HCG Boost order needed.      
HCG bump ordered and scheduled for day of US, routing to provider for review and signature.  Mariela Waldrop RN   
Lets see if this works!! :) order signed.    Laxmi Elizabeth MD    
23-Mar-2025 21:07

## 2025-05-22 ENCOUNTER — OFFICE VISIT (OUTPATIENT)
Dept: OBGYN | Facility: CLINIC | Age: 35
End: 2025-05-22
Attending: STUDENT IN AN ORGANIZED HEALTH CARE EDUCATION/TRAINING PROGRAM
Payer: COMMERCIAL

## 2025-05-22 VITALS
BODY MASS INDEX: 27.4 KG/M2 | WEIGHT: 166.2 LBS | DIASTOLIC BLOOD PRESSURE: 82 MMHG | SYSTOLIC BLOOD PRESSURE: 137 MMHG | HEART RATE: 81 BPM

## 2025-05-22 DIAGNOSIS — N92.6 IRREGULAR PERIODS: ICD-10-CM

## 2025-05-22 DIAGNOSIS — R93.89 THICKENED ENDOMETRIUM: ICD-10-CM

## 2025-05-22 DIAGNOSIS — Z30.011 ORAL CONTRACEPTIVE PRESCRIBED: Primary | ICD-10-CM

## 2025-05-22 RX ORDER — DROSPIRENONE AND ETHINYL ESTRADIOL 0.03MG-3MG
1 KIT ORAL DAILY
Qty: 112 TABLET | Refills: 4 | Status: SHIPPED | OUTPATIENT
Start: 2025-05-22

## 2025-05-22 NOTE — PATIENT INSTRUCTIONS
The first year you do something continuous (pill, patch or ring) you will have the same number of bleeding days as if you had taken a week off each month, but the bleeding days will be at random times.   As long as you are okay with that, you can start trying continuous therapy.    There are 2 ways to take the pill/patch/ring continuously:  1) take active tablets for 3 weeks, or patches for 3 weeks or ring for 3 weeks, then instead of one week off do another 1-2 months of active pill/patch or ring.   (ie you will be on active hormones for 6 or 9 weeks and then do one week off so skipping periods)     2) you can take active tablets/patch or ring until you have red bleeding, then take 4-7 days off of hormone and restart.   Bleeding will happen at random times.

## 2025-05-22 NOTE — PROGRESS NOTES
CC:  irregular menses  HPI:  Bhumika Gonsalez is a 35 year old female Patient's last menstrual period was 05/22/2025.   Cycles have been irregular very long time.  They can last up to 2 weeks and sometimes just a lot of spotting.  Had ultrasound in February of this year which showed endometrium to be 17 mm or mildly thickened otherwise normal uterus and ovaries.  Kids are now 5 and 2 years old.  Complains of extreme PMS and irritability with her cycles.  Spouse did get a vasectomy.    Allergies: Cats      EXAM:  Blood pressure 137/82, pulse 81, weight 75.4 kg (166 lb 3.2 oz), last menstrual period 05/22/2025, not currently breastfeeding.    General - pleasant female in no acute distress.  Psychiactric - appropriate mood and affect  Neurological - alert and oriented X 3    ASSESSMENT/PLAN:  prep time day of service 3 min  visit time with the patient 14 min  post visit work including documentation time 6 min  Total time: 23 min    (Z30.011) Oral contraceptive prescribed  (primary encounter diagnosis)  Comment: Irregular cycles  Plan: drospirenone-ethinyl estradiol (SARAH BETH) 3-0.03         MG tablet        We reviewed how the birthcontrol pill works and possible expected side effects.  Specifically, she was informed of the irregular bleeding pattern that can occur when the pill is first started or a new form is changed over for the first 2-3 months. She was told to call the clinic if experiencing any ill side effects or abnormal bleeding pattern persisting after 3 months.  She was instructed to start the pill now after most of the bleeding has stopped since her cycle started today.  Continuous use was also discussed and she will take the first pack the regular way and then can start taking active tablets until she has red bleeding.  Discussed this should also help with her PMS and perimenopausal symptoms in the future      (N92.6) Irregular periods  Plan: Entire life.  No further children    (R93.89) Thickened  endometrium  Comment: 35 and without risk factors  Plan: No endometrial biopsy required      Laxmi Elizabeth MD

## 2025-07-25 ENCOUNTER — MYC MEDICAL ADVICE (OUTPATIENT)
Dept: FAMILY MEDICINE | Facility: CLINIC | Age: 35
End: 2025-07-25
Payer: COMMERCIAL

## 2025-07-28 ENCOUNTER — E-VISIT (OUTPATIENT)
Dept: FAMILY MEDICINE | Facility: CLINIC | Age: 35
End: 2025-07-28
Payer: COMMERCIAL

## 2025-07-28 DIAGNOSIS — Z02.89 ENCOUNTER FOR COMPLETION OF FORM WITH PATIENT: Primary | ICD-10-CM

## 2025-07-28 PROCEDURE — 99207 PR NON-BILLABLE SERV PER CHARTING: CPT | Performed by: STUDENT IN AN ORGANIZED HEALTH CARE EDUCATION/TRAINING PROGRAM

## 2025-07-28 ASSESSMENT — ASTHMA QUESTIONNAIRES: ACT_TOTALSCORE: 25

## 2025-07-28 NOTE — PATIENT INSTRUCTIONS
Felipe Bai. Sorry for the confusion. I believe a virtual visit (not an evisit) would be best to go over your forms together and get it filled out. You can schedule this via [facility] or by calling clinic at [phone]. I do have an opening on Wednesday 8/6 at 2pm for a video call-let me know if you need help scheduling this.    Dr. Byrd      You can schedule an appointment by clicking here in [facility], or call [phone].

## 2025-07-28 NOTE — TELEPHONE ENCOUNTER
Provider E-Visit time total (minutes): Referred to in person/virtual visit.       Pt needs virtual visit for form completion.    Dr. Byrd

## 2025-08-06 ENCOUNTER — VIRTUAL VISIT (OUTPATIENT)
Dept: FAMILY MEDICINE | Facility: CLINIC | Age: 35
End: 2025-08-06
Payer: COMMERCIAL

## 2025-08-06 DIAGNOSIS — Z02.89 ENCOUNTER FOR COMPLETION OF FORM WITH PATIENT: Primary | ICD-10-CM

## 2025-08-06 PROCEDURE — 98004 SYNCH AUDIO-VIDEO EST SF 10: CPT | Performed by: STUDENT IN AN ORGANIZED HEALTH CARE EDUCATION/TRAINING PROGRAM

## 2025-08-07 ENCOUNTER — DOCUMENTATION ONLY (OUTPATIENT)
Dept: FAMILY MEDICINE | Facility: CLINIC | Age: 35
End: 2025-08-07
Payer: COMMERCIAL

## 2025-09-02 ENCOUNTER — MYC MEDICAL ADVICE (OUTPATIENT)
Dept: OBGYN | Facility: CLINIC | Age: 35
End: 2025-09-02
Payer: COMMERCIAL